# Patient Record
Sex: FEMALE | Race: WHITE | NOT HISPANIC OR LATINO | Employment: PART TIME | URBAN - METROPOLITAN AREA
[De-identification: names, ages, dates, MRNs, and addresses within clinical notes are randomized per-mention and may not be internally consistent; named-entity substitution may affect disease eponyms.]

---

## 2017-09-25 ENCOUNTER — PATIENT MESSAGE (OUTPATIENT)
Dept: MEDICAL GROUP | Facility: MEDICAL CENTER | Age: 47
End: 2017-09-25

## 2017-09-25 NOTE — TELEPHONE ENCOUNTER
From: Marcella Barajas  To: Sujit Ortega M.D.  Sent: 9/25/2017 9:57 AM PDT  Subject: Non-Urgent Medical Question    Good morning. I have had slightly swollen, and tender glands in my armpits for a couple months now (right worse than left). I am wondering if I should schedule an appointment? Maybe you would want me to get bloodwork done first, then come in for an appointment?    Thank you,  Marcella Barajas

## 2017-10-02 ENCOUNTER — OFFICE VISIT (OUTPATIENT)
Dept: MEDICAL GROUP | Facility: MEDICAL CENTER | Age: 47
End: 2017-10-02
Payer: COMMERCIAL

## 2017-10-02 VITALS
RESPIRATION RATE: 16 BRPM | DIASTOLIC BLOOD PRESSURE: 78 MMHG | TEMPERATURE: 98 F | OXYGEN SATURATION: 100 % | HEIGHT: 67 IN | BODY MASS INDEX: 25.08 KG/M2 | SYSTOLIC BLOOD PRESSURE: 122 MMHG | WEIGHT: 159.8 LBS | HEART RATE: 90 BPM

## 2017-10-02 DIAGNOSIS — Z12.31 ENCOUNTER FOR SCREENING MAMMOGRAM FOR BREAST CANCER: ICD-10-CM

## 2017-10-02 DIAGNOSIS — R92.30 DENSE BREAST TISSUE ON MAMMOGRAM: ICD-10-CM

## 2017-10-02 DIAGNOSIS — N64.4 BREAST PAIN IN FEMALE: ICD-10-CM

## 2017-10-02 PROCEDURE — 99213 OFFICE O/P EST LOW 20 MIN: CPT | Performed by: FAMILY MEDICINE

## 2017-10-02 ASSESSMENT — PATIENT HEALTH QUESTIONNAIRE - PHQ9: CLINICAL INTERPRETATION OF PHQ2 SCORE: 0

## 2017-10-02 NOTE — PROGRESS NOTES
"Subjective:   Marcella Barajas is a 47 y.o. female here today for Breast pain    Breast pain in female  Since July patient has had increased breast pain. She states the pain is 7 out of 10. Pain is most probably in right axilla. She has not noticed any changes on self breast exam. Last mammogram was 1.5 years ago and it was normal, although she has had heterogeneous breast tissue on mammogram.  Patient had a hysterectomy but ovaries were left in place. She denies any hot flashes.         Current medicines (including changes today)  Current Outpatient Prescriptions   Medication Sig Dispense Refill   • Multiple Vitamin (MULTI-VITAMIN PO) Take  by mouth.     • VITAMIN D PO Take  by mouth.     • Docusate Sodium (COLACE PO) Take  by mouth.       No current facility-administered medications for this visit.      She  has a past medical history of Anesthesia.    ROS   No breast discharge       Objective:     Blood pressure 122/78, pulse 90, temperature 36.7 °C (98 °F), resp. rate 16, height 1.702 m (5' 7.01\"), weight 72.5 kg (159 lb 12.8 oz), SpO2 100 %. Body mass index is 25.02 kg/m².   Physical Exam:  Constitutional: Alert, no distress.  Skin: Warm, dry, good turgor, no rashes in visible areas.  Eye: Equal, round and reactive, conjunctiva clear, lids normal.  Psych: Alert and oriented x3, normal affect and mood.  Breast: Symmetrical bilaterally, no scarring, no nipple retraction, no dimpling, no erythema, no nipple discharge, no breast masses, tenderness palpation of right axilla, no palpable lymph nodes, no drainage, no erythema      Assessment and Plan:   The following treatment plan was discussed    1. Breast pain in female  Discussed healthy diet and exercise. Check mammogram and screening ultrasound. If no improvement consider referral to GYN.    2. Dense breast tissue on mammogram  - US-SCREENING BREAST (SONOCINE); Future    3. Encounter for screening mammogram for breast cancer  - MA-SCREEN MAMMO " W/CAD-BILAT; Future      Followup: Return if symptoms worsen or fail to improve.

## 2017-10-02 NOTE — ASSESSMENT & PLAN NOTE
Since July patient has had increased breast pain. She states the pain is 7 out of 10. Pain is most probably in right axilla. She has not noticed any changes on self breast exam. Last mammogram was 1.5 years ago and it was normal, although she has had heterogeneous breast tissue on mammogram.  Patient had a hysterectomy but ovaries were left in place. She denies any hot flashes.

## 2017-10-03 ENCOUNTER — PATIENT MESSAGE (OUTPATIENT)
Dept: MEDICAL GROUP | Facility: MEDICAL CENTER | Age: 47
End: 2017-10-03

## 2017-11-28 ENCOUNTER — TELEPHONE (OUTPATIENT)
Dept: RADIOLOGY | Facility: MEDICAL CENTER | Age: 47
End: 2017-11-28

## 2017-11-28 NOTE — TELEPHONE ENCOUNTER
LM to conf apt @ [facility] on 11/29 @ 8:00 check in @ 7:45, reviewed prep and location

## 2017-11-29 ENCOUNTER — HOSPITAL ENCOUNTER (OUTPATIENT)
Dept: RADIOLOGY | Facility: MEDICAL CENTER | Age: 47
End: 2017-11-29
Attending: FAMILY MEDICINE
Payer: COMMERCIAL

## 2017-11-29 DIAGNOSIS — Z12.31 ENCOUNTER FOR SCREENING MAMMOGRAM FOR BREAST CANCER: ICD-10-CM

## 2017-11-29 DIAGNOSIS — R92.30 DENSE BREAST TISSUE ON MAMMOGRAM: ICD-10-CM

## 2017-11-29 PROCEDURE — G0202 SCR MAMMO BI INCL CAD: HCPCS

## 2017-11-29 PROCEDURE — 76641 ULTRASOUND BREAST COMPLETE: CPT

## 2018-06-19 ENCOUNTER — PATIENT MESSAGE (OUTPATIENT)
Dept: MEDICAL GROUP | Facility: MEDICAL CENTER | Age: 48
End: 2018-06-19

## 2018-06-19 DIAGNOSIS — Z12.83 SKIN CANCER SCREENING: ICD-10-CM

## 2018-06-19 NOTE — TELEPHONE ENCOUNTER
From: Marcella Barajas  To: Sujit Ortega M.D.  Sent: 6/19/2018 8:48 AM PDT  Subject: Non-Urgent Medical Question    Good morning. I called the dermatologist that two of my sons go to, asking for an appointment for myself. They said their office procedure requires me to get a referral from my primary care physician.     May I please get a referral for me to go see Christine Mariee R.N. at [facility].     [address] [place] [address], [phone], [phone] (fax)     Please let me know if you need anything else from me to process this request.    Thanks you,   Marcella Barajas

## 2018-11-23 ENCOUNTER — APPOINTMENT (OUTPATIENT)
Dept: RADIOLOGY | Facility: IMAGING CENTER | Age: 48
End: 2018-11-23
Attending: FAMILY MEDICINE
Payer: COMMERCIAL

## 2018-11-23 ENCOUNTER — OFFICE VISIT (OUTPATIENT)
Dept: URGENT CARE | Facility: CLINIC | Age: 48
End: 2018-11-23
Payer: COMMERCIAL

## 2018-11-23 VITALS
DIASTOLIC BLOOD PRESSURE: 66 MMHG | WEIGHT: 159.2 LBS | BODY MASS INDEX: 24.93 KG/M2 | OXYGEN SATURATION: 100 % | SYSTOLIC BLOOD PRESSURE: 128 MMHG | HEART RATE: 89 BPM | TEMPERATURE: 98.2 F

## 2018-11-23 DIAGNOSIS — S62.653A CLOSED NONDISPLACED FRACTURE OF MIDDLE PHALANX OF LEFT MIDDLE FINGER, INITIAL ENCOUNTER: ICD-10-CM

## 2018-11-23 DIAGNOSIS — M79.646 PAIN OF MIDDLE FINGER: ICD-10-CM

## 2018-11-23 PROCEDURE — 73140 X-RAY EXAM OF FINGER(S): CPT | Mod: 26,LT | Performed by: FAMILY MEDICINE

## 2018-11-23 PROCEDURE — 99214 OFFICE O/P EST MOD 30 MIN: CPT | Performed by: FAMILY MEDICINE

## 2018-11-23 RX ORDER — HYDROCODONE BITARTRATE AND ACETAMINOPHEN 5; 325 MG/1; MG/1
1 TABLET ORAL EVERY 4 HOURS PRN
Qty: 10 TAB | Refills: 0 | Status: SHIPPED | OUTPATIENT
Start: 2018-11-23 | End: 2018-11-26

## 2018-11-23 ASSESSMENT — ENCOUNTER SYMPTOMS
SHORTNESS OF BREATH: 0
FOCAL WEAKNESS: 0
SENSORY CHANGE: 0
SORE THROAT: 0
FEVER: 0

## 2018-11-24 NOTE — PROGRESS NOTES
Subjective:     Marcella Barajas is a 48 y.o. female who presents for Finger Injury (x 2 hrs, L hand middle finger )    HPI  Pt presents for evaluation of a new problem  Pt playing flag football and caught finger in pocket of another person   Fell a pulling and twisting injury of the left third digit and had immediate pain and swelling  Pain is greatest along the PIP and also having pain at the DIP joint  Pain is constant, moderate, sharp, and worse with movement  Pain does not radiate into the hand and did not injure any other fingers  Finger feels swollen and bruised  Slight fingertip numbness present  Unable to move her finger due to great pain  Has not broken this finger before    Review of Systems   Constitutional: Negative for fever.   HENT: Negative for sore throat.    Respiratory: Negative for shortness of breath.    Cardiovascular: Negative for chest pain.   Skin: Negative for rash.   Neurological: Negative for sensory change and focal weakness.     PMH:  has a past medical history of Anesthesia.  MEDS:   Current Outpatient Prescriptions:   •  Multiple Vitamin (MULTI-VITAMIN PO), Take  by mouth., Disp: , Rfl:   •  VITAMIN D PO, Take  by mouth., Disp: , Rfl:   •  Docusate Sodium (COLACE PO), Take  by mouth., Disp: , Rfl:   ALLERGIES: No Known Allergies  SURGHX:   Past Surgical History:   Procedure Laterality Date   • VAGINAL HYSTERECTOMY SCOPE TOTAL  1/26/2010    Performed by STEVEN LIAO at SURGERY SAME DAY [facility]S   • CYSTOSCOPY  1/26/2010    Performed by STEVEN LIAO at SURGERY SAME DAY [facility]   • LAPAROSCOPY     • OTHER      lasik   • TONSILLECTOMY       SOCHX:  reports that she has never smoked. She has never used smokeless tobacco. She reports that she drinks about 7.0 oz of alcohol per week . She reports that she does not use drugs.  FH: Family history was reviewed, not contributing to acute injury      Objective:   /66 (BP Location: Right arm, Patient Position:  Sitting)   Pulse 89   Temp 36.8 °C (98.2 °F)   Wt 72.2 kg (159 lb 3.2 oz)   SpO2 100%   BMI 24.93 kg/m²     Physical Exam   Constitutional: She is oriented to person, place, and time. She appears well-developed and well-nourished. No distress.   HENT:   Head: Normocephalic and atraumatic.   Musculoskeletal:   Left wrist/hand  General: Mild amount of swelling present throughout 3rd digit   Palpation: TTP along 3rd digit PIP and DIP  ROM: Limited by pain   Strength: 5/5 flexion, 5/5 extension, 5/5   Special testing: -Tinel’s, -Phalen's, -Finkelstein’s  Neuro: median, radial, ulnar nerves intact on testing  Vascular: radial, ulnar pulses 2+ and symmetric, cap refill <2 sec   Neurological: She is alert and oriented to person, place, and time. No sensory deficit.   Skin: Skin is warm and dry. She is not diaphoretic. No erythema.   Psychiatric: She has a normal mood and affect. Her behavior is normal. Judgment and thought content normal.     Left hand x-rays -personally reviewed images with patient in office today.  Mild radial subluxation of third distal phalanx with nondisplaced fracture of middle phalanx.  Also has a tiny lucency in the PIP joint which could represent fracture from the distal aspect of proximal phalanx.    Assessment/Plan:   Assessment    1. Closed nondisplaced fracture of middle phalanx of left middle finger, initial encounter  Patient is a 48-year-old female with acute injury a few hours ago which resulted in fracture of middle phalanx of left third digit.  Able to reduce patient's subluxation which is immediately pulled back into subluxed position.  Suspect she has an injury to her collateral ligament.  Patient was splinted in the straight position with finger splint and has good alignment on discharge.  She is being referred to orthopedic surgery for further management.  Given 3 days of Norco for pain control and advised to use very sparingly and rely mostly on ibuprofen and Tylenol for  pain control.  Patient will follow up with orthopedic surgery.  - DX-FINGER(S) 2+ LEFT; Future  - HYDROcodone-acetaminophen (NORCO) 5-325 MG Tab per tablet; Take 1 Tab by mouth every four hours as needed for up to 3 days.  Dispense: 10 Tab; Refill: 0  - Consent for Opiate Prescription  - REFERRAL TO ORTHOPEDICS    F/U PRN

## 2019-04-19 ENCOUNTER — OFFICE VISIT (OUTPATIENT)
Dept: MEDICAL GROUP | Facility: MEDICAL CENTER | Age: 49
End: 2019-04-19
Payer: COMMERCIAL

## 2019-04-19 VITALS
HEIGHT: 67 IN | DIASTOLIC BLOOD PRESSURE: 68 MMHG | OXYGEN SATURATION: 99 % | TEMPERATURE: 97.9 F | HEART RATE: 68 BPM | BODY MASS INDEX: 24.64 KG/M2 | SYSTOLIC BLOOD PRESSURE: 106 MMHG | WEIGHT: 157 LBS

## 2019-04-19 DIAGNOSIS — E55.9 VITAMIN D INSUFFICIENCY: ICD-10-CM

## 2019-04-19 DIAGNOSIS — Z12.31 ENCOUNTER FOR SCREENING MAMMOGRAM FOR BREAST CANCER: ICD-10-CM

## 2019-04-19 DIAGNOSIS — N64.4 BREAST PAIN IN FEMALE: ICD-10-CM

## 2019-04-19 DIAGNOSIS — Z90.710 H/O HYSTERECTOMY FOR BENIGN DISEASE: ICD-10-CM

## 2019-04-19 DIAGNOSIS — D68.51 FACTOR 5 LEIDEN MUTATION, HETEROZYGOUS (HCC): ICD-10-CM

## 2019-04-19 DIAGNOSIS — K59.09 CHRONIC CONSTIPATION: ICD-10-CM

## 2019-04-19 DIAGNOSIS — Z00.00 ANNUAL PHYSICAL EXAM: ICD-10-CM

## 2019-04-19 PROCEDURE — 99396 PREV VISIT EST AGE 40-64: CPT | Performed by: FAMILY MEDICINE

## 2019-04-19 ASSESSMENT — PATIENT HEALTH QUESTIONNAIRE - PHQ9: CLINICAL INTERPRETATION OF PHQ2 SCORE: 0

## 2019-04-19 NOTE — PROGRESS NOTES
"Subjective:   Marcella Barajas is a 49 y.o. female here today for annual  Patient is  will be retiring in the coming months and they will be losing her insurance.    She exercises a few times a week with adult league volleyball, personal training and generally being active.    Breast pain in female  Occurs only in the summer. No associated rash.    Chronic constipation  Patient states that her symptoms are controlled with Colace.    Vitamin D insufficiency  Patient is tolerating vitamin D supplementation.    Factor 5 Leiden mutation, heterozygous  No history of DVTs.  She stands up to walk when on a flight every hour.    H/O hysterectomy for benign disease  No concerns.         Current medicines (including changes today)  Current Outpatient Prescriptions   Medication Sig Dispense Refill   • Multiple Vitamin (MULTI-VITAMIN PO) Take  by mouth.     • VITAMIN D PO Take  by mouth.     • Docusate Sodium (COLACE PO) Take  by mouth.       No current facility-administered medications for this visit.      She  has a past medical history of Anesthesia.    ROS   No chest pain, no shortness of breath, no abdominal pain       Objective:     /68 (BP Location: Right arm, Patient Position: Sitting)   Pulse 68   Temp 36.6 °C (97.9 °F)   Ht 1.702 m (5' 7\")   Wt 71.2 kg (157 lb)   SpO2 99%  Body mass index is 24.59 kg/m².   Physical Exam:  Constitutional: Alert, no distress.  Skin: Warm, dry, good turgor, no rashes in visible areas.  Eye: Equal, round and reactive, conjunctiva clear, lids normal.  ENMT: Lips without lesions, good dentition, oropharynx clear.  TMs pearly gray bilaterally.  Neck: Trachea midline, no masses, no thyromegaly. No cervical or supraclavicular lymphadenopathy  Respiratory: Unlabored respiratory effort, lungs clear to auscultation, no wheezes, no ronchi.  Cardiovascular: Normal S1, S2, no murmur, no edema.  Abdomen: Soft, non-tender, no masses, no hepatosplenomegaly.  Psych: Alert " and oriented x3, normal affect and mood.        Assessment and Plan:   The following treatment plan was discussed    1. Annual physical exam  Encourage patient continue healthy lifestyle.  Check labs and call with results.  - CBC WITH DIFFERENTIAL; Future  - Comp Metabolic Panel; Future  - TSH WITH REFLEX TO FT4; Future  - Lipid Profile; Future    2. Encounter for screening mammogram for breast cancer  - MA-SCREEN MAMMO W/CAD-BILAT; Future    3. Breast pain in female  Stable.  Continue to monitor.    4. H/O hysterectomy for benign disease  Asymptomatic.  Continue to monitor.    5. Chronic constipation  Controlled.  Continue Colace.    6. Factor 5 Leiden mutation, heterozygous (HCC)  No concerns.  Continue to monitor.    7. Vitamin D insufficiency  Continue vitamin D supplementation.      Followup: Return in about 1 year (around 4/19/2020) for Annual.

## 2019-04-26 ENCOUNTER — HOSPITAL ENCOUNTER (OUTPATIENT)
Dept: LAB | Facility: MEDICAL CENTER | Age: 49
End: 2019-04-26
Attending: FAMILY MEDICINE
Payer: COMMERCIAL

## 2019-04-26 ENCOUNTER — TELEPHONE (OUTPATIENT)
Dept: MEDICAL GROUP | Facility: MEDICAL CENTER | Age: 49
End: 2019-04-26

## 2019-04-26 DIAGNOSIS — Z00.00 ANNUAL PHYSICAL EXAM: ICD-10-CM

## 2019-04-26 LAB
ALBUMIN SERPL BCP-MCNC: 4 G/DL (ref 3.2–4.9)
ALBUMIN/GLOB SERPL: 1.5 G/DL
ALP SERPL-CCNC: 77 U/L (ref 30–99)
ALT SERPL-CCNC: 18 U/L (ref 2–50)
ANION GAP SERPL CALC-SCNC: 4 MMOL/L (ref 0–11.9)
AST SERPL-CCNC: 21 U/L (ref 12–45)
BASOPHILS # BLD AUTO: 0.7 % (ref 0–1.8)
BASOPHILS # BLD: 0.03 K/UL (ref 0–0.12)
BILIRUB SERPL-MCNC: 0.5 MG/DL (ref 0.1–1.5)
BUN SERPL-MCNC: 17 MG/DL (ref 8–22)
CALCIUM SERPL-MCNC: 10.3 MG/DL (ref 8.5–10.5)
CHLORIDE SERPL-SCNC: 108 MMOL/L (ref 96–112)
CHOLEST SERPL-MCNC: 169 MG/DL (ref 100–199)
CO2 SERPL-SCNC: 28 MMOL/L (ref 20–33)
CREAT SERPL-MCNC: 0.73 MG/DL (ref 0.5–1.4)
EOSINOPHIL # BLD AUTO: 0.06 K/UL (ref 0–0.51)
EOSINOPHIL NFR BLD: 1.5 % (ref 0–6.9)
ERYTHROCYTE [DISTWIDTH] IN BLOOD BY AUTOMATED COUNT: 45.9 FL (ref 35.9–50)
FASTING STATUS PATIENT QL REPORTED: NORMAL
GLOBULIN SER CALC-MCNC: 2.6 G/DL (ref 1.9–3.5)
GLUCOSE SERPL-MCNC: 96 MG/DL (ref 65–99)
HCT VFR BLD AUTO: 45.7 % (ref 37–47)
HDLC SERPL-MCNC: 72 MG/DL
HGB BLD-MCNC: 15 G/DL (ref 12–16)
IMM GRANULOCYTES # BLD AUTO: 0.01 K/UL (ref 0–0.11)
IMM GRANULOCYTES NFR BLD AUTO: 0.2 % (ref 0–0.9)
LDLC SERPL CALC-MCNC: 83 MG/DL
LYMPHOCYTES # BLD AUTO: 1.24 K/UL (ref 1–4.8)
LYMPHOCYTES NFR BLD: 30.2 % (ref 22–41)
MCH RBC QN AUTO: 32.2 PG (ref 27–33)
MCHC RBC AUTO-ENTMCNC: 32.8 G/DL (ref 33.6–35)
MCV RBC AUTO: 98.1 FL (ref 81.4–97.8)
MONOCYTES # BLD AUTO: 0.33 K/UL (ref 0–0.85)
MONOCYTES NFR BLD AUTO: 8 % (ref 0–13.4)
NEUTROPHILS # BLD AUTO: 2.43 K/UL (ref 2–7.15)
NEUTROPHILS NFR BLD: 59.4 % (ref 44–72)
NRBC # BLD AUTO: 0 K/UL
NRBC BLD-RTO: 0 /100 WBC
PLATELET # BLD AUTO: 146 K/UL (ref 164–446)
PMV BLD AUTO: 13 FL (ref 9–12.9)
POTASSIUM SERPL-SCNC: 4.5 MMOL/L (ref 3.6–5.5)
PROT SERPL-MCNC: 6.6 G/DL (ref 6–8.2)
RBC # BLD AUTO: 4.66 M/UL (ref 4.2–5.4)
SODIUM SERPL-SCNC: 140 MMOL/L (ref 135–145)
TRIGL SERPL-MCNC: 69 MG/DL (ref 0–149)
TSH SERPL DL<=0.005 MIU/L-ACNC: 0.92 UIU/ML (ref 0.38–5.33)
WBC # BLD AUTO: 4.1 K/UL (ref 4.8–10.8)

## 2019-04-26 PROCEDURE — 85025 COMPLETE CBC W/AUTO DIFF WBC: CPT

## 2019-04-26 PROCEDURE — 80061 LIPID PANEL: CPT

## 2019-04-26 PROCEDURE — 36415 COLL VENOUS BLD VENIPUNCTURE: CPT

## 2019-04-26 PROCEDURE — 80053 COMPREHEN METABOLIC PANEL: CPT

## 2019-04-26 PROCEDURE — 84443 ASSAY THYROID STIM HORMONE: CPT

## 2019-04-26 NOTE — TELEPHONE ENCOUNTER
----- Message from Sujit Ortega M.D. sent at 4/26/2019  1:36 PM PDT -----  Please notify patient that blood counts are stable.  Blood sugar, kidney function, liver function, cholesterol, thyroid function are all normal.  Sujit Ortega M.D.

## 2019-05-22 ENCOUNTER — APPOINTMENT (OUTPATIENT)
Dept: RADIOLOGY | Facility: MEDICAL CENTER | Age: 49
End: 2019-05-22
Attending: FAMILY MEDICINE
Payer: COMMERCIAL

## 2019-06-19 ENCOUNTER — HOSPITAL ENCOUNTER (OUTPATIENT)
Dept: RADIOLOGY | Facility: MEDICAL CENTER | Age: 49
End: 2019-06-19
Attending: FAMILY MEDICINE
Payer: COMMERCIAL

## 2019-06-19 DIAGNOSIS — Z12.31 ENCOUNTER FOR SCREENING MAMMOGRAM FOR BREAST CANCER: ICD-10-CM

## 2019-06-19 PROCEDURE — 77063 BREAST TOMOSYNTHESIS BI: CPT

## 2019-06-20 ENCOUNTER — TELEPHONE (OUTPATIENT)
Dept: MEDICAL GROUP | Facility: MEDICAL CENTER | Age: 49
End: 2019-06-20

## 2019-06-20 NOTE — TELEPHONE ENCOUNTER
----- Message from Sujit Ortega M.D. sent at 6/20/2019  7:28 AM PDT -----  Please notify the patient that her mammogram shows that she has high breast density. This can lower the accuracy of mammograms. There is a screening ultrasound that can help to detect breast cancers in women with high breast density.  The screening ultrasound is not covered with insurance and cost around $100.  If she would like for me to order the screening ultrasound, please let me know.  Sujit Ortega M.D.

## 2019-08-13 ENCOUNTER — APPOINTMENT (RX ONLY)
Dept: URBAN - METROPOLITAN AREA CLINIC 35 | Facility: CLINIC | Age: 49
Setting detail: DERMATOLOGY
End: 2019-08-13

## 2019-08-13 DIAGNOSIS — L81.4 OTHER MELANIN HYPERPIGMENTATION: ICD-10-CM

## 2019-08-13 DIAGNOSIS — Z71.89 OTHER SPECIFIED COUNSELING: ICD-10-CM

## 2019-08-13 DIAGNOSIS — D22 MELANOCYTIC NEVI: ICD-10-CM

## 2019-08-13 PROBLEM — D22.4 MELANOCYTIC NEVI OF SCALP AND NECK: Status: ACTIVE | Noted: 2019-08-13

## 2019-08-13 PROBLEM — D22.62 MELANOCYTIC NEVI OF LEFT UPPER LIMB, INCLUDING SHOULDER: Status: ACTIVE | Noted: 2019-08-13

## 2019-08-13 PROBLEM — D22.5 MELANOCYTIC NEVI OF TRUNK: Status: ACTIVE | Noted: 2019-08-13

## 2019-08-13 PROBLEM — D23.72 OTHER BENIGN NEOPLASM OF SKIN OF LEFT LOWER LIMB, INCLUDING HIP: Status: ACTIVE | Noted: 2019-08-13

## 2019-08-13 PROBLEM — D22.71 MELANOCYTIC NEVI OF RIGHT LOWER LIMB, INCLUDING HIP: Status: ACTIVE | Noted: 2019-08-13

## 2019-08-13 PROBLEM — D22.72 MELANOCYTIC NEVI OF LEFT LOWER LIMB, INCLUDING HIP: Status: ACTIVE | Noted: 2019-08-13

## 2019-08-13 PROBLEM — D22.61 MELANOCYTIC NEVI OF RIGHT UPPER LIMB, INCLUDING SHOULDER: Status: ACTIVE | Noted: 2019-08-13

## 2019-08-13 PROCEDURE — 99214 OFFICE O/P EST MOD 30 MIN: CPT

## 2019-08-13 PROCEDURE — ? OBSERVATION AND MEASURE

## 2019-08-13 PROCEDURE — ? COUNSELING

## 2019-08-13 ASSESSMENT — LOCATION ZONE DERM
LOCATION ZONE: ARM
LOCATION ZONE: LEG
LOCATION ZONE: TRUNK
LOCATION ZONE: NECK

## 2019-08-13 ASSESSMENT — LOCATION SIMPLE DESCRIPTION DERM
LOCATION SIMPLE: RIGHT SHOULDER
LOCATION SIMPLE: RIGHT POSTERIOR UPPER ARM
LOCATION SIMPLE: RIGHT PRETIBIAL REGION
LOCATION SIMPLE: CHEST
LOCATION SIMPLE: LEFT THIGH
LOCATION SIMPLE: NECK
LOCATION SIMPLE: LEFT POPLITEAL SKIN
LOCATION SIMPLE: LEFT POSTERIOR UPPER ARM
LOCATION SIMPLE: RIGHT CALF
LOCATION SIMPLE: ABDOMEN
LOCATION SIMPLE: UPPER BACK

## 2019-08-13 ASSESSMENT — LOCATION DETAILED DESCRIPTION DERM
LOCATION DETAILED: RIGHT CENTRAL LATERAL NECK
LOCATION DETAILED: LEFT ANTERIOR DISTAL THIGH
LOCATION DETAILED: LEFT POPLITEAL SKIN
LOCATION DETAILED: RIGHT ANTERIOR SHOULDER
LOCATION DETAILED: LEFT PROXIMAL POSTERIOR UPPER ARM
LOCATION DETAILED: SUPERIOR THORACIC SPINE
LOCATION DETAILED: MIDDLE STERNUM
LOCATION DETAILED: RIGHT PROXIMAL CALF
LOCATION DETAILED: RIGHT CENTRAL ANTERIOR NECK
LOCATION DETAILED: RIGHT LATERAL ABDOMEN
LOCATION DETAILED: RIGHT PROXIMAL PRETIBIAL REGION
LOCATION DETAILED: RIGHT PROXIMAL POSTERIOR UPPER ARM

## 2019-11-26 ENCOUNTER — HOSPITAL ENCOUNTER (INPATIENT)
Facility: MEDICAL CENTER | Age: 49
LOS: 1 days | DRG: 300 | End: 2019-11-27
Attending: EMERGENCY MEDICINE | Admitting: INTERNAL MEDICINE
Payer: COMMERCIAL

## 2019-11-26 ENCOUNTER — APPOINTMENT (OUTPATIENT)
Dept: CARDIOLOGY | Facility: MEDICAL CENTER | Age: 49
DRG: 300 | End: 2019-11-26
Attending: INTERNAL MEDICINE
Payer: COMMERCIAL

## 2019-11-26 ENCOUNTER — APPOINTMENT (OUTPATIENT)
Dept: RADIOLOGY | Facility: MEDICAL CENTER | Age: 49
DRG: 300 | End: 2019-11-26
Attending: EMERGENCY MEDICINE
Payer: COMMERCIAL

## 2019-11-26 DIAGNOSIS — I26.99 ACUTE PULMONARY EMBOLISM WITHOUT ACUTE COR PULMONALE, UNSPECIFIED PULMONARY EMBOLISM TYPE (HCC): ICD-10-CM

## 2019-11-26 PROBLEM — Z98.890 S/P ABDOMINOPLASTY: Status: ACTIVE | Noted: 2019-11-26

## 2019-11-26 LAB
ALBUMIN SERPL BCP-MCNC: 4.1 G/DL (ref 3.2–4.9)
ALBUMIN/GLOB SERPL: 1.3 G/DL
ALP SERPL-CCNC: 87 U/L (ref 30–99)
ALT SERPL-CCNC: 16 U/L (ref 2–50)
ANION GAP SERPL CALC-SCNC: 15 MMOL/L (ref 0–11.9)
APTT PPP: 27 SEC (ref 24.7–36)
AST SERPL-CCNC: 20 U/L (ref 12–45)
BASOPHILS # BLD AUTO: 0.5 % (ref 0–1.8)
BASOPHILS # BLD: 0.04 K/UL (ref 0–0.12)
BILIRUB SERPL-MCNC: 0.4 MG/DL (ref 0.1–1.5)
BUN SERPL-MCNC: 11 MG/DL (ref 8–22)
CALCIUM SERPL-MCNC: 10.7 MG/DL (ref 8.4–10.2)
CHLORIDE SERPL-SCNC: 101 MMOL/L (ref 96–112)
CO2 SERPL-SCNC: 23 MMOL/L (ref 20–33)
CREAT SERPL-MCNC: 0.66 MG/DL (ref 0.5–1.4)
EKG IMPRESSION: NORMAL
EOSINOPHIL # BLD AUTO: 0.25 K/UL (ref 0–0.51)
EOSINOPHIL NFR BLD: 2.9 % (ref 0–6.9)
ERYTHROCYTE [DISTWIDTH] IN BLOOD BY AUTOMATED COUNT: 39.5 FL (ref 35.9–50)
GLOBULIN SER CALC-MCNC: 3.2 G/DL (ref 1.9–3.5)
GLUCOSE SERPL-MCNC: 107 MG/DL (ref 65–99)
HCT VFR BLD AUTO: 41 % (ref 37–47)
HGB BLD-MCNC: 14.1 G/DL (ref 12–16)
IMM GRANULOCYTES # BLD AUTO: 0.02 K/UL (ref 0–0.11)
IMM GRANULOCYTES NFR BLD AUTO: 0.2 % (ref 0–0.9)
INR PPP: 0.96 (ref 0.87–1.13)
LV EJECT FRACT  99904: 70
LV EJECT FRACT MOD 2C 99903: 71.03
LV EJECT FRACT MOD 4C 99902: 72.04
LV EJECT FRACT MOD BP 99901: 71.53
LYMPHOCYTES # BLD AUTO: 1.22 K/UL (ref 1–4.8)
LYMPHOCYTES NFR BLD: 14.3 % (ref 22–41)
MCH RBC QN AUTO: 31.5 PG (ref 27–33)
MCHC RBC AUTO-ENTMCNC: 34.4 G/DL (ref 33.6–35)
MCV RBC AUTO: 91.5 FL (ref 81.4–97.8)
MONOCYTES # BLD AUTO: 0.79 K/UL (ref 0–0.85)
MONOCYTES NFR BLD AUTO: 9.2 % (ref 0–13.4)
NEUTROPHILS # BLD AUTO: 6.24 K/UL (ref 2–7.15)
NEUTROPHILS NFR BLD: 72.9 % (ref 44–72)
NRBC # BLD AUTO: 0 K/UL
NRBC BLD-RTO: 0 /100 WBC
PLATELET # BLD AUTO: 141 K/UL (ref 164–446)
PMV BLD AUTO: 11.2 FL (ref 9–12.9)
POTASSIUM SERPL-SCNC: 3.8 MMOL/L (ref 3.6–5.5)
PROT SERPL-MCNC: 7.3 G/DL (ref 6–8.2)
PROTHROMBIN TIME: 12.9 SEC (ref 12–14.6)
RBC # BLD AUTO: 4.48 M/UL (ref 4.2–5.4)
SODIUM SERPL-SCNC: 139 MMOL/L (ref 135–145)
TROPONIN T SERPL-MCNC: <6 NG/L (ref 6–19)
WBC # BLD AUTO: 8.6 K/UL (ref 4.8–10.8)

## 2019-11-26 PROCEDURE — 700105 HCHG RX REV CODE 258: Performed by: EMERGENCY MEDICINE

## 2019-11-26 PROCEDURE — 770020 HCHG ROOM/CARE - TELE (206)

## 2019-11-26 PROCEDURE — 85730 THROMBOPLASTIN TIME PARTIAL: CPT

## 2019-11-26 PROCEDURE — 85610 PROTHROMBIN TIME: CPT

## 2019-11-26 PROCEDURE — 36415 COLL VENOUS BLD VENIPUNCTURE: CPT

## 2019-11-26 PROCEDURE — 700102 HCHG RX REV CODE 250 W/ 637 OVERRIDE(OP): Performed by: INTERNAL MEDICINE

## 2019-11-26 PROCEDURE — 71275 CT ANGIOGRAPHY CHEST: CPT

## 2019-11-26 PROCEDURE — 700117 HCHG RX CONTRAST REV CODE 255: Performed by: EMERGENCY MEDICINE

## 2019-11-26 PROCEDURE — 93306 TTE W/DOPPLER COMPLETE: CPT | Mod: 26 | Performed by: INTERNAL MEDICINE

## 2019-11-26 PROCEDURE — 94760 N-INVAS EAR/PLS OXIMETRY 1: CPT

## 2019-11-26 PROCEDURE — A9270 NON-COVERED ITEM OR SERVICE: HCPCS | Performed by: INTERNAL MEDICINE

## 2019-11-26 PROCEDURE — 93005 ELECTROCARDIOGRAM TRACING: CPT | Performed by: EMERGENCY MEDICINE

## 2019-11-26 PROCEDURE — 99223 1ST HOSP IP/OBS HIGH 75: CPT | Performed by: INTERNAL MEDICINE

## 2019-11-26 PROCEDURE — 80053 COMPREHEN METABOLIC PANEL: CPT

## 2019-11-26 PROCEDURE — 85025 COMPLETE CBC W/AUTO DIFF WBC: CPT

## 2019-11-26 PROCEDURE — 93306 TTE W/DOPPLER COMPLETE: CPT

## 2019-11-26 PROCEDURE — 99285 EMERGENCY DEPT VISIT HI MDM: CPT

## 2019-11-26 PROCEDURE — 84484 ASSAY OF TROPONIN QUANT: CPT

## 2019-11-26 RX ORDER — ONDANSETRON 4 MG/1
4 TABLET, ORALLY DISINTEGRATING ORAL EVERY 6 HOURS PRN
COMMUNITY
End: 2019-12-03

## 2019-11-26 RX ORDER — GABAPENTIN 300 MG/1
300 CAPSULE ORAL PRN
COMMUNITY
End: 2019-12-03

## 2019-11-26 RX ORDER — ONDANSETRON 2 MG/ML
4 INJECTION INTRAMUSCULAR; INTRAVENOUS EVERY 4 HOURS PRN
Status: DISCONTINUED | OUTPATIENT
Start: 2019-11-26 | End: 2019-11-27 | Stop reason: HOSPADM

## 2019-11-26 RX ORDER — PROMETHAZINE HYDROCHLORIDE 25 MG/1
12.5-25 TABLET ORAL EVERY 4 HOURS PRN
Status: DISCONTINUED | OUTPATIENT
Start: 2019-11-26 | End: 2019-11-27 | Stop reason: HOSPADM

## 2019-11-26 RX ORDER — PROCHLORPERAZINE EDISYLATE 5 MG/ML
5-10 INJECTION INTRAMUSCULAR; INTRAVENOUS EVERY 4 HOURS PRN
Status: DISCONTINUED | OUTPATIENT
Start: 2019-11-26 | End: 2019-11-27 | Stop reason: HOSPADM

## 2019-11-26 RX ORDER — POLYETHYLENE GLYCOL 3350 17 G/17G
17 POWDER, FOR SOLUTION ORAL PRN
COMMUNITY
End: 2019-12-03

## 2019-11-26 RX ORDER — PROMETHAZINE HYDROCHLORIDE 25 MG/1
12.5-25 SUPPOSITORY RECTAL EVERY 4 HOURS PRN
Status: DISCONTINUED | OUTPATIENT
Start: 2019-11-26 | End: 2019-11-27 | Stop reason: HOSPADM

## 2019-11-26 RX ORDER — ONDANSETRON 4 MG/1
4 TABLET, ORALLY DISINTEGRATING ORAL EVERY 4 HOURS PRN
Status: DISCONTINUED | OUTPATIENT
Start: 2019-11-26 | End: 2019-11-27 | Stop reason: HOSPADM

## 2019-11-26 RX ORDER — CYCLOBENZAPRINE HCL 10 MG
10 TABLET ORAL 3 TIMES DAILY PRN
Status: DISCONTINUED | OUTPATIENT
Start: 2019-11-26 | End: 2019-11-27 | Stop reason: HOSPADM

## 2019-11-26 RX ORDER — POLYETHYLENE GLYCOL 3350 17 G/17G
1 POWDER, FOR SOLUTION ORAL
Status: DISCONTINUED | OUTPATIENT
Start: 2019-11-26 | End: 2019-11-27 | Stop reason: HOSPADM

## 2019-11-26 RX ORDER — SODIUM CHLORIDE, SODIUM LACTATE, POTASSIUM CHLORIDE, CALCIUM CHLORIDE 600; 310; 30; 20 MG/100ML; MG/100ML; MG/100ML; MG/100ML
1000 INJECTION, SOLUTION INTRAVENOUS ONCE
Status: COMPLETED | OUTPATIENT
Start: 2019-11-26 | End: 2019-11-26

## 2019-11-26 RX ORDER — BISACODYL 10 MG
10 SUPPOSITORY, RECTAL RECTAL
Status: DISCONTINUED | OUTPATIENT
Start: 2019-11-26 | End: 2019-11-27 | Stop reason: HOSPADM

## 2019-11-26 RX ORDER — OXYCODONE HYDROCHLORIDE AND ACETAMINOPHEN 5; 325 MG/1; MG/1
1-2 TABLET ORAL EVERY 6 HOURS PRN
COMMUNITY
End: 2019-12-03

## 2019-11-26 RX ORDER — CYCLOBENZAPRINE HCL 10 MG
10 TABLET ORAL 3 TIMES DAILY PRN
COMMUNITY
End: 2019-12-03

## 2019-11-26 RX ORDER — CEPHALEXIN 500 MG/1
500 CAPSULE ORAL 4 TIMES DAILY
COMMUNITY
Start: 2019-11-21 | End: 2019-12-03

## 2019-11-26 RX ORDER — OXYCODONE HYDROCHLORIDE AND ACETAMINOPHEN 5; 325 MG/1; MG/1
1-2 TABLET ORAL EVERY 6 HOURS PRN
Status: DISCONTINUED | OUTPATIENT
Start: 2019-11-26 | End: 2019-11-27 | Stop reason: HOSPADM

## 2019-11-26 RX ORDER — AMOXICILLIN 250 MG
2 CAPSULE ORAL 2 TIMES DAILY
Status: DISCONTINUED | OUTPATIENT
Start: 2019-11-26 | End: 2019-11-27 | Stop reason: HOSPADM

## 2019-11-26 RX ORDER — GABAPENTIN 300 MG/1
300 CAPSULE ORAL 3 TIMES DAILY PRN
Status: DISCONTINUED | OUTPATIENT
Start: 2019-11-26 | End: 2019-11-27 | Stop reason: HOSPADM

## 2019-11-26 RX ADMIN — IOHEXOL 75 ML: 350 INJECTION, SOLUTION INTRAVENOUS at 12:47

## 2019-11-26 RX ADMIN — SENNOSIDES AND DOCUSATE SODIUM 2 TABLET: 8.6; 5 TABLET ORAL at 17:06

## 2019-11-26 RX ADMIN — SODIUM CHLORIDE, POTASSIUM CHLORIDE, SODIUM LACTATE AND CALCIUM CHLORIDE 1000 ML: 600; 310; 30; 20 INJECTION, SOLUTION INTRAVENOUS at 12:15

## 2019-11-26 RX ADMIN — RIVAROXABAN 15 MG: 15 TABLET, FILM COATED ORAL at 14:21

## 2019-11-26 ASSESSMENT — COGNITIVE AND FUNCTIONAL STATUS - GENERAL
DAILY ACTIVITIY SCORE: 24
MOBILITY SCORE: 24
SUGGESTED CMS G CODE MODIFIER DAILY ACTIVITY: CH
SUGGESTED CMS G CODE MODIFIER MOBILITY: CH

## 2019-11-26 ASSESSMENT — ENCOUNTER SYMPTOMS
FEVER: 0
CONSTIPATION: 1
DIARRHEA: 0
DIZZINESS: 0
SHORTNESS OF BREATH: 1
TREMORS: 0
CHILLS: 0
ABDOMINAL PAIN: 0
ORTHOPNEA: 0
MYALGIAS: 0
STRIDOR: 0
NAUSEA: 0
SPUTUM PRODUCTION: 0
WHEEZING: 0
VOMITING: 0
HEADACHES: 0
SORE THROAT: 0
BACK PAIN: 0

## 2019-11-26 ASSESSMENT — COPD QUESTIONNAIRES
IN THE PAST 12 MONTHS DO YOU DO LESS THAN YOU USED TO BECAUSE OF YOUR BREATHING PROBLEMS: DISAGREE/UNSURE
DURING THE PAST 4 WEEKS HOW MUCH DID YOU FEEL SHORT OF BREATH: NONE/LITTLE OF THE TIME
DO YOU EVER COUGH UP ANY MUCUS OR PHLEGM?: NO/ONLY WITH OCCASIONAL COLDS OR INFECTIONS
COPD SCREENING SCORE: 0
HAVE YOU SMOKED AT LEAST 100 CIGARETTES IN YOUR ENTIRE LIFE: NO/DON'T KNOW

## 2019-11-26 ASSESSMENT — LIFESTYLE VARIABLES
EVER_SMOKED: NEVER
HAVE YOU EVER FELT YOU SHOULD CUT DOWN ON YOUR DRINKING: NO
HAVE PEOPLE ANNOYED YOU BY CRITICIZING YOUR DRINKING: NO
DOES PATIENT WANT TO STOP DRINKING: NO
EVER FELT BAD OR GUILTY ABOUT YOUR DRINKING: NO
EVER HAD A DRINK FIRST THING IN THE MORNING TO STEADY YOUR NERVES TO GET RID OF A HANGOVER: NO
TOTAL SCORE: 0
ON A TYPICAL DAY WHEN YOU DRINK ALCOHOL HOW MANY DRINKS DO YOU HAVE: 2
HOW MANY TIMES IN THE PAST YEAR HAVE YOU HAD 5 OR MORE DRINKS IN A DAY: 0
AVERAGE NUMBER OF DAYS PER WEEK YOU HAVE A DRINK CONTAINING ALCOHOL: 3
CONSUMPTION TOTAL: NEGATIVE
ALCOHOL_USE: YES
TOTAL SCORE: 0
EVER_SMOKED: NEVER
TOTAL SCORE: 0

## 2019-11-26 ASSESSMENT — PATIENT HEALTH QUESTIONNAIRE - PHQ9
1. LITTLE INTEREST OR PLEASURE IN DOING THINGS: NOT AT ALL
SUM OF ALL RESPONSES TO PHQ9 QUESTIONS 1 AND 2: 0
2. FEELING DOWN, DEPRESSED, IRRITABLE, OR HOPELESS: NOT AT ALL

## 2019-11-26 NOTE — ASSESSMENT & PLAN NOTE
And patient is heterozygous for factor V leiden mutation causing her to be hypercoagulable  Encourage follow up with plastic surgery after discharge as scheduled

## 2019-11-26 NOTE — ED PROVIDER NOTES
"[facility] Provider Note    CHIEF COMPLAINT  Chief Complaint   Patient presents with   • Post-Op Complications     pt reports difficulty taking a full breath, had tummy tuck 1 week ago   • Sent by MD QUINONEZ  Marcella Barajas is a 49 y.o. female who presents to the emergency department after being referred by her plastic surgeon.  Patient had abdominal plasty 1 week ago.  Had follow-up checkup and told them about the chest pain that she developed this morning.  It first started over the left lateral chest and has since migrated left parasternal region.  Sharp character.  Only present if she takes a deep breath.  Constant since this morning.  She has not had cough or hemoptysis.  No leg swelling or leg pain.  No fever.  No new abdominal pain.  Her wounds were checked this morning by her plastic surgeon the nerve looked well.      REVIEW OF SYSTEMS  As per HPI, otherwise a 10 point review of systems is negative    PAST MEDICAL HISTORY  Past Medical History:   Diagnosis Date   • Anesthesia     nausea   • Factor 5 Leiden mutation, heterozygous (HCC)        SOCIAL HISTORY  Social History     Tobacco Use   • Smoking status: Never Smoker   • Smokeless tobacco: Never Used   Substance Use Topics   • Alcohol use: Yes     Alcohol/week: 7.0 oz     Types: 14 Glasses of wine per week   • Drug use: No       SURGICAL HISTORY  Past Surgical History:   Procedure Laterality Date   • VAGINAL HYSTERECTOMY SCOPE TOTAL  1/26/2010    Performed by STEVEN LIAO at SURGERY SAME DAY [facility]   • CYSTOSCOPY  1/26/2010    Performed by STEVEN LIAO at SURGERY SAME DAY [facility]   • LAPAROSCOPY     • OTHER      lasik   • TONSILLECTOMY         CURRENT MEDICATIONS  Home Medications    **Home medications have not yet been reviewed for this encounter**         ALLERGIES  No Known Allergies    PHYSICAL EXAM  VITAL SIGNS: /86   Pulse (!) 117   Temp 37.7 °C (99.8 °F) (Temporal)   Resp 20   Ht 1.727 m (5' 8\")  "  Wt 72.5 kg (159 lb 13.3 oz)   SpO2 95%   BMI 24.30 kg/m²    Constitutional: Awake and alert.  Anxious  HENT:  Atraumatic, Normocephalic.Oropharynx dry mucus membranes, Nose normal inspection.   Eyes: Normal inspection  Neck: Supple  Cardiovascular: Tachycardic heart rate, Normal rhythm.  Symmetric peripheral pulses.   Thorax & Lungs: Splinting respiration, No wheezing, No rales, No rhonchi, No chest tenderness.   Abdomen: Mild tenderness.  Skin: Warm, Dry, No rash.   Back: No tenderness, No CVA tenderness.   Extremities: No clubbing, cyanosis, edema, no Homans or cords   Neurologic: Grossly normal   Psychiatric: Anxious appearing    RADIOLOGY/PROCEDURES  CT-CTA CHEST PULMONARY ARTERY W/ RECONS   Final Result      1.  Bilateral pulmonary emboli with findings on CT suggestive of right ventricular strain      2.  Groundglass opacification in the lingula is likely related to pulmonary infarct. Additional linear opacities in the lower lobes are likely related to atelectasis.      3.  Small left pleural effusion         Comment: Results discussed with Dr. Carr at approximately 1:03 PM           Imaging is interpreted by radiologist    Labs:  Results for orders placed or performed during the hospital encounter of 11/26/19   CBC w/ Differential   Result Value Ref Range    WBC 8.6 4.8 - 10.8 K/uL    RBC 4.48 4.20 - 5.40 M/uL    Hemoglobin 14.1 12.0 - 16.0 g/dL    Hematocrit 41.0 37.0 - 47.0 %    MCV 91.5 81.4 - 97.8 fL    MCH 31.5 27.0 - 33.0 pg    MCHC 34.4 33.6 - 35.0 g/dL    RDW 39.5 35.9 - 50.0 fL    Platelet Count 141 (L) 164 - 446 K/uL    MPV 11.2 9.0 - 12.9 fL    Neutrophils-Polys 72.90 (H) 44.00 - 72.00 %    Lymphocytes 14.30 (L) 22.00 - 41.00 %    Monocytes 9.20 0.00 - 13.40 %    Eosinophils 2.90 0.00 - 6.90 %    Basophils 0.50 0.00 - 1.80 %    Immature Granulocytes 0.20 0.00 - 0.90 %    Nucleated RBC 0.00 /100 WBC    Neutrophils (Absolute) 6.24 2.00 - 7.15 K/uL    Lymphs (Absolute) 1.22 1.00 - 4.80 K/uL     Monos (Absolute) 0.79 0.00 - 0.85 K/uL    Eos (Absolute) 0.25 0.00 - 0.51 K/uL    Baso (Absolute) 0.04 0.00 - 0.12 K/uL    Immature Granulocytes (abs) 0.02 0.00 - 0.11 K/uL    NRBC (Absolute) 0.00 K/uL   Complete Metabolic Panel (CMP)   Result Value Ref Range    Sodium 139 135 - 145 mmol/L    Potassium 3.8 3.6 - 5.5 mmol/L    Chloride 101 96 - 112 mmol/L    Co2 23 20 - 33 mmol/L    Anion Gap 15.0 (H) 0.0 - 11.9    Glucose 107 (H) 65 - 99 mg/dL    Bun 11 8 - 22 mg/dL    Creatinine 0.66 0.50 - 1.40 mg/dL    Calcium 10.7 (H) 8.4 - 10.2 mg/dL    AST(SGOT) 20 12 - 45 U/L    ALT(SGPT) 16 2 - 50 U/L    Alkaline Phosphatase 87 30 - 99 U/L    Total Bilirubin 0.4 0.1 - 1.5 mg/dL    Albumin 4.1 3.2 - 4.9 g/dL    Total Protein 7.3 6.0 - 8.2 g/dL    Globulin 3.2 1.9 - 3.5 g/dL    A-G Ratio 1.3 g/dL   Troponin STAT   Result Value Ref Range    Troponin T <6 6 - 19 ng/L   APTT   Result Value Ref Range    APTT 27.0 24.7 - 36.0 sec   PT/INR   Result Value Ref Range    PT 12.9 12.0 - 14.6 sec    INR 0.96 0.87 - 1.13   ESTIMATED GFR   Result Value Ref Range    GFR If African American >60 >60 mL/min/1.73 m 2    GFR If Non African American >60 >60 mL/min/1.73 m 2   EKG   Result Value Ref Range    Report       [facility]    Test Date:  2019  Pt Name:    ROBIN MCGREGOR           Department: [facility]  MRN:        [id number]                      Room:       -ROOM 5  Gender:     Female                       Technician: NICKOLAS  :        1970                   Requested By:SHIRA RAMIREZ  Order #:    [id number]                    Reading MD: SHIRA RAMIREZ MD    Measurements  Intervals                                Axis  Rate:       108                          P:          72  FL:         156                          QRS:        70  QRSD:       87                           T:          32  QT:         316  QTc:        424    Interpretive Statements  Sinus tachycardia  No previous ECG  available for comparison  Electronically Signed On 11- 12:17:01 PST by SHIRA RAMIREZ MD         Medications   rivaroxaban (XARELTO) tablet 15 mg (has no administration in time range)     Followed by   rivaroxaban (XARELTO) tablet 20 mg (has no administration in time range)   LR infusion (bolus) (1,000 mL Intravenous New Bag 11/26/19 1215)   iohexol (OMNIPAQUE) 350 mg/mL (75 mL Intravenous Given 11/26/19 1247)       HYDRATION: Based on the patient's presentation of Tachycardia the patient was given IV fluids. IV Hydration was used because oral hydration was not as rapid as required. Upon recheck following hydration, the patient was Stable.    COURSE & MEDICAL DECISION MAKING  Patient presents with pleuritic chest pain 1 week after surgery.  Work-up is initiated.  Tachycardic and given IV fluids.  EKG does not show ischemia.  CT scan demonstrates bilateral pulmonary emboli.  Discussed with Dr. Coe.  I have ordered Xarelto.  Patient will be admitted to the hospital.  Condition guarded      FINAL IMPRESSION  1.  Bilateral pulmonary emboli    CRITICAL CARE TIME 30 minutes  There was a very real possibility of deterioration of the patient's condition.  This patient required the highest level of care.  I provided critical care services which included: review of the medical record, treatment orders, ordering and reviewing test results, frequent reevaluation of the patient's condition and response to treatment, as well as discussing the case with appropriate personnel and various consultants. The critical care time associated with the care of this patient is exclusive of any procedures or specific interventions.      This dictation was created using voice recognition software. The accuracy of the dictation is limited to the abilities of the software.  The nursing notes were reviewed and certain aspects of this information were incorporated into this note.      Electronically signed by: Shira Ramirez, 11/26/2019  12:15 PM

## 2019-11-26 NOTE — ED TRIAGE NOTES
"Chief Complaint   Patient presents with   • Post-Op Complications     pt reports difficulty taking a full breath, had tummy tuck 1 week ago   • Sent by MD     Pt reports that she is experiencing CP, started below left breast and is now substernal.  Pt is tachycardic in triage. Pt has factor 5 Leiden but is not anticoagulated.   /86   Pulse (!) 117   Temp 37.7 °C (99.8 °F) (Temporal)   Resp 20   Ht 1.727 m (5' 8\")   Wt 72.5 kg (159 lb 13.3 oz)   SpO2 95%   Pt informed of wait times. Educated on triage process.  Asked to return to triage RN for any new or worsening of symptoms. Thanked for patience.         "

## 2019-11-26 NOTE — ASSESSMENT & PLAN NOTE
With evidence of right heart strain on CT scan, will check an echocardiogram to evaluate farther  Will give one dose weight based lovenox now and start xarelto for anticoagulation  The patient has no tachycardia or hypoxia, will monitor closely on telemetry

## 2019-11-26 NOTE — ED NOTES
ERP at bedside. Pt agrees with plan of care discussed by ERP. AIDET acknowledged with patient. IV established. Blood sent to lab.Niyah in low position, side rail up for pt safety. Call light within reach. Will continue to monitor.

## 2019-11-26 NOTE — H&P
Hospital Medicine History & Physical Note    Date of Service  11/26/2019    Primary Care Physician  Sujit Ortega M.D.    Consultants  none    Code Status  full    Chief Complaint  Chest pain    History of Presenting Illness  49 y.o. female who presented 11/26/2019 with chest pain worse with taking a deep breath since 4 am yesterday. She had abdominoplasty one week ago and has been up getting to and from the bathroom but has not been doing as much as normal due to pain. She is heterozygous for factor V leiden mutation but has had a hysterectomy and other surgeries in the past with  No clots. Her brother had a blood clot in the past and this is what triggered her genetic testing for factor V mutation. She denies any cough, fever, chills, leg swelling or headache.    Review of Systems  Review of Systems   Constitutional: Negative for chills, fever and malaise/fatigue.   HENT: Negative for congestion and sore throat.    Respiratory: Positive for shortness of breath. Negative for sputum production, wheezing and stridor.    Cardiovascular: Positive for chest pain. Negative for orthopnea and leg swelling.   Gastrointestinal: Positive for constipation. Negative for abdominal pain, diarrhea, nausea and vomiting.   Genitourinary: Negative for dysuria, frequency and urgency.   Musculoskeletal: Negative for back pain and myalgias.   Skin: Negative for itching and rash.   Neurological: Negative for dizziness, tremors and headaches.   All other systems reviewed and are negative.      Past Medical History   has a past medical history of Anesthesia and Factor 5 Leiden mutation, heterozygous (HCC).    Surgical History   has a past surgical history that includes tonsillectomy; laparoscopy; other; vaginal hysterectomy scope total (1/26/2010); and cystoscopy (1/26/2010). abdominoplasty    Family History  family history includes Hypertension in her mother.     Social History   reports that she has never smoked. She has never used  smokeless tobacco. She reports current alcohol use of about 7.0 oz of alcohol per week. She reports that she does not use drugs.    Allergies  No Known Allergies    Medications  Prior to Admission Medications   Prescriptions Last Dose Informant Patient Reported? Taking?   Docusate Sodium (COLACE PO) 11/25/2019 at 2100 Patient Yes No   Sig: Take 2 Caps by mouth every evening.   Multiple Vitamin (MULTI-VITAMIN PO) 11/25/2019 at 2100 Patient Yes No   Sig: Take 1 Tab by mouth every evening.   Sennosides (EX-LAX PO) 11/24/2019 at AM Patient Yes Yes   Sig: Take 1 Tab by mouth as needed (For constipation).   VITAMIN D PO > 1 week at PM Patient Yes No   Sig: Take 1 Cap by mouth every evening.   cephALEXin (KEFLEX) 500 MG Cap 11/25/2019 at Finished Patient's Home Pharmacy Yes Yes   Sig: Take 500 mg by mouth 4 times a day. Pt started on 11/21/2019 for 5 day course.   cyclobenzaprine (FLEXERIL) 10 MG Tab 11/25/2019 at 0700 Patient's Home Pharmacy Yes Yes   Sig: Take 10 mg by mouth 3 times a day as needed for Muscle Spasms.   gabapentin (NEURONTIN) 300 MG Cap > 3 days at Unknown Patient's Home Pharmacy Yes Yes   Sig: Take 300 mg by mouth as needed (For pain).   ondansetron (ZOFRAN ODT) 4 MG TABLET DISPERSIBLE 11/19/2019 at PM Patient's Home Pharmacy Yes Yes   Sig: Take 4 mg by mouth every 6 hours as needed for Nausea.   oxyCODONE-acetaminophen (PERCOCET) 5-325 MG Tab 11/25/2019 at 0900 Patient's Home Pharmacy Yes Yes   Sig: Take 1-2 Tabs by mouth every 6 hours as needed for Severe Pain.   polyethylene glycol/lytes (MIRALAX) Pack 11/21/2019 at Unknown Patient Yes Yes   Sig: Take 17 g by mouth as needed. Indications: Constipation      Facility-Administered Medications: None       Physical Exam  Temp:  [37.7 °C (99.8 °F)] 37.7 °C (99.8 °F)  Pulse:  [101-117] 102  Resp:  [20-34] 21  BP: (129-134)/(70-86) 132/70  SpO2:  [95 %-98 %] 96 %    Physical Exam  Vitals signs and nursing note reviewed.   Constitutional:       General: She is  not in acute distress.  HENT:      Head: Normocephalic and atraumatic.      Nose: No congestion or rhinorrhea.      Mouth/Throat:      Mouth: Mucous membranes are moist.      Pharynx: No oropharyngeal exudate or posterior oropharyngeal erythema.   Eyes:      Conjunctiva/sclera: Conjunctivae normal.      Pupils: Pupils are equal, round, and reactive to light.   Neck:      Musculoskeletal: Neck supple. No muscular tenderness.   Cardiovascular:      Rate and Rhythm: Normal rate and regular rhythm.      Pulses: Normal pulses.      Heart sounds: Normal heart sounds.   Pulmonary:      Effort: Pulmonary effort is normal. No respiratory distress.      Breath sounds: No stridor. No wheezing.   Abdominal:      General: Bowel sounds are normal.      Palpations: Abdomen is soft.   Musculoskeletal:         General: No swelling or tenderness.   Skin:     Coloration: Skin is not jaundiced or pale.      Findings: No bruising.   Neurological:      Mental Status: She is alert and oriented to person, place, and time.      Coordination: Coordination normal.   Psychiatric:         Mood and Affect: Mood normal.         Behavior: Behavior normal.         Laboratory:  Recent Labs     11/26/19  1206   WBC 8.6   RBC 4.48   HEMOGLOBIN 14.1   HEMATOCRIT 41.0   MCV 91.5   MCH 31.5   MCHC 34.4   RDW 39.5   PLATELETCT 141*   MPV 11.2     Recent Labs     11/26/19  1206   SODIUM 139   POTASSIUM 3.8   CHLORIDE 101   CO2 23   GLUCOSE 107*   BUN 11   CREATININE 0.66   CALCIUM 10.7*     Recent Labs     11/26/19  1206   ALTSGPT 16   ASTSGOT 20   ALKPHOSPHAT 87   TBILIRUBIN 0.4   GLUCOSE 107*     Recent Labs     11/26/19  1206   APTT 27.0   INR 0.96     No results for input(s): NTPROBNP in the last 72 hours.      Recent Labs     11/26/19  1206   TROPONINT <6       Urinalysis:    No results found     Imaging:  CT-CTA CHEST PULMONARY ARTERY W/ RECONS   Final Result      1.  Bilateral pulmonary emboli with findings on CT suggestive of right ventricular  strain      2.  Groundglass opacification in the lingula is likely related to pulmonary infarct. Additional linear opacities in the lower lobes are likely related to atelectasis.      3.  Small left pleural effusion         Comment: Results discussed with Dr. Carr at approximately 1:03 PM      EC-ECHOCARDIOGRAM COMPLETE W/O CONT    (Results Pending)         Assessment/Plan:  I anticipate this patient will require at least two midnights for appropriate medical management, necessitating inpatient admission.    S/P abdominoplasty- (present on admission)  Assessment & Plan  And patient is heterozygous for factor V leiden mutation causing her to be hypercoagulable  Encourage follow up with plastic surgery after discharge as scheduled    Acute pulmonary embolism (HCC)- (present on admission)  Assessment & Plan  With evidence of right heart strain on CT scan, will check an echocardiogram to evaluate farther  Will give one dose weight based lovenox now and start xarelto for anticoagulation  The patient has no tachycardia or hypoxia, will monitor closely on telemetry    Chronic constipation- (present on admission)  Assessment & Plan  Will order stool softeners  As  needed        VTE prophylaxis: lovenox/xarelto

## 2019-11-26 NOTE — ED NOTES
Med rec updated and complete  Allergies reviewed  Interviewed pt with mother at bedside with permission from pt.  Pt is not sure what medications she was taking.  Called Onofre Chuy @ [phone] to verify all prescription medications, went back asked pt last time she took her medications.

## 2019-11-27 ENCOUNTER — PATIENT OUTREACH (OUTPATIENT)
Dept: HEALTH INFORMATION MANAGEMENT | Facility: OTHER | Age: 49
End: 2019-11-27

## 2019-11-27 VITALS
BODY MASS INDEX: 24.73 KG/M2 | OXYGEN SATURATION: 98 % | HEIGHT: 68 IN | TEMPERATURE: 98.1 F | RESPIRATION RATE: 18 BRPM | DIASTOLIC BLOOD PRESSURE: 69 MMHG | HEART RATE: 94 BPM | SYSTOLIC BLOOD PRESSURE: 118 MMHG | WEIGHT: 163.14 LBS

## 2019-11-27 LAB
ANION GAP SERPL CALC-SCNC: 15 MMOL/L (ref 0–11.9)
BASOPHILS # BLD AUTO: 0.5 % (ref 0–1.8)
BASOPHILS # BLD: 0.04 K/UL (ref 0–0.12)
BUN SERPL-MCNC: 8 MG/DL (ref 8–22)
CALCIUM SERPL-MCNC: 10.6 MG/DL (ref 8.4–10.2)
CHLORIDE SERPL-SCNC: 100 MMOL/L (ref 96–112)
CO2 SERPL-SCNC: 20 MMOL/L (ref 20–33)
CREAT SERPL-MCNC: 0.54 MG/DL (ref 0.5–1.4)
EOSINOPHIL # BLD AUTO: 0.32 K/UL (ref 0–0.51)
EOSINOPHIL NFR BLD: 4.3 % (ref 0–6.9)
ERYTHROCYTE [DISTWIDTH] IN BLOOD BY AUTOMATED COUNT: 40.3 FL (ref 35.9–50)
GLUCOSE SERPL-MCNC: 96 MG/DL (ref 65–99)
HCT VFR BLD AUTO: 38.7 % (ref 37–47)
HGB BLD-MCNC: 13.1 G/DL (ref 12–16)
IMM GRANULOCYTES # BLD AUTO: 0.01 K/UL (ref 0–0.11)
IMM GRANULOCYTES NFR BLD AUTO: 0.1 % (ref 0–0.9)
LYMPHOCYTES # BLD AUTO: 1.33 K/UL (ref 1–4.8)
LYMPHOCYTES NFR BLD: 17.9 % (ref 22–41)
MCH RBC QN AUTO: 31.5 PG (ref 27–33)
MCHC RBC AUTO-ENTMCNC: 33.9 G/DL (ref 33.6–35)
MCV RBC AUTO: 93 FL (ref 81.4–97.8)
MONOCYTES # BLD AUTO: 0.67 K/UL (ref 0–0.85)
MONOCYTES NFR BLD AUTO: 9 % (ref 0–13.4)
NEUTROPHILS # BLD AUTO: 5.05 K/UL (ref 2–7.15)
NEUTROPHILS NFR BLD: 68.2 % (ref 44–72)
NRBC # BLD AUTO: 0 K/UL
NRBC BLD-RTO: 0 /100 WBC
PLATELET # BLD AUTO: 153 K/UL (ref 164–446)
PMV BLD AUTO: 11.7 FL (ref 9–12.9)
POTASSIUM SERPL-SCNC: 4.4 MMOL/L (ref 3.6–5.5)
RBC # BLD AUTO: 4.16 M/UL (ref 4.2–5.4)
SODIUM SERPL-SCNC: 135 MMOL/L (ref 135–145)
WBC # BLD AUTO: 7.4 K/UL (ref 4.8–10.8)

## 2019-11-27 PROCEDURE — 99239 HOSP IP/OBS DSCHRG MGMT >30: CPT | Performed by: HOSPITALIST

## 2019-11-27 PROCEDURE — A9270 NON-COVERED ITEM OR SERVICE: HCPCS | Performed by: HOSPITALIST

## 2019-11-27 PROCEDURE — 700102 HCHG RX REV CODE 250 W/ 637 OVERRIDE(OP): Performed by: INTERNAL MEDICINE

## 2019-11-27 PROCEDURE — 80048 BASIC METABOLIC PNL TOTAL CA: CPT

## 2019-11-27 PROCEDURE — 85025 COMPLETE CBC W/AUTO DIFF WBC: CPT

## 2019-11-27 PROCEDURE — 700102 HCHG RX REV CODE 250 W/ 637 OVERRIDE(OP): Performed by: HOSPITALIST

## 2019-11-27 PROCEDURE — A9270 NON-COVERED ITEM OR SERVICE: HCPCS | Performed by: INTERNAL MEDICINE

## 2019-11-27 RX ORDER — ACETAMINOPHEN 325 MG/1
650 TABLET ORAL EVERY 6 HOURS PRN
Status: DISCONTINUED | OUTPATIENT
Start: 2019-11-27 | End: 2019-11-27 | Stop reason: HOSPADM

## 2019-11-27 RX ADMIN — ACETAMINOPHEN 650 MG: 325 TABLET, FILM COATED ORAL at 05:17

## 2019-11-27 RX ADMIN — RIVAROXABAN 15 MG: 15 TABLET, FILM COATED ORAL at 04:20

## 2019-11-27 NOTE — PROGRESS NOTES
Telemetry Shift Summary    Rhythm ST  HR Range 101-103  Ectopy none  Measurements 0.16/0.08/0.34        Normal Values  Rhythm SR  HR Range    Measurements 0.12-0.20 / 0.06-0.10  / 0.30-0.52

## 2019-11-27 NOTE — FLOWSHEET NOTE
11/26/19 1629   Events/Summary/Plan   Events/Summary/Plan RCP completed. IS given   Interdisciplinary Plan of Care-Goals (Indications)   Hyperinflation Protocol Indications Atelectasis Documented by Chest X-Ray   Education   Education Yes - Pt. / Family has been Instructed in use of Respiratory Equipment   Incentive Spirometry Group   Incentive Spirometry Instruction Yes   Breathing Exercises Yes   Incentive Spirometer Volume 1000 mL   Incentive Spirometer Date Last Changed 11/26/19   Incentive Spirometer Next Change Date (Q 30 Days) 12/26/19   Chest Exam   Respiration 18   Pulse (!) 105   Breath Sounds   RUL Breath Sounds Clear   RML Breath Sounds Clear   RLL Breath Sounds Diminished   AARON Breath Sounds Clear   LLL Breath Sounds Diminished   Oximetry   #Pulse Oximetry (Single Determination) Yes   Oxygen   Home O2 Use Prior To Admission? No   Pulse Oximetry 93 %   O2 Daily Delivery Respiratory  Room Air with O2 Available

## 2019-11-27 NOTE — PROGRESS NOTES
Telemetry Shift Summary    Rhythm ST  HR Range 100s  Ectopy n/a  Measurements 0.16/0.08/0.32        Normal Values  Rhythm SR  HR Range    Measurements 0.12-0.20 / 0.06-0.10  / 0.30-0.52

## 2019-11-27 NOTE — PROGRESS NOTES
Discharge instructions given, all belongings with patient, escorted down to ER entrance, going home with family

## 2019-11-27 NOTE — DISCHARGE INSTRUCTIONS
Discharge Instructions per Terri Tavares M.D.    Follow up with Dr. Ortega to get prescription for 20mg Xarelto after finishing the two week course of loading dose. Consider having him refer you to a hematologist.    DIET: regular.    ACTIVITY: as tolerated    DIAGNOSIS: bilateral pulmonary emboli, factor V Leiden heterozygous.    Return to ER if you have chest pain worsening, shortness of breath worse, coughing up blood.  Discharge Instructions    Discharged to home by car with relative. Discharged via wheelchair, hospital escort: Yes.  Special equipment needed: Not Applicable    Be sure to schedule a follow-up appointment with your primary care doctor or any specialists as instructed.     Discharge Plan:   Diet Plan: Discussed  Activity Level: Discussed  Confirmed Follow up Appointment: Appointment Scheduled  Confirmed Symptoms Management: Discussed  Medication Reconciliation Updated: Yes  Influenza Vaccine Indication: Not indicated: Previously immunized this influenza season and > 8 years of age    I understand that a diet low in cholesterol, fat, and sodium is recommended for good health. Unless I have been given specific instructions below for another diet, I accept this instruction as my diet prescription.   Other diet: Regular    Special Instructions: None    · Is patient discharged on Warfarin / Coumadin?   No     Depression / Suicide Risk    As you are discharged from this [facility] facility, it is important to learn how to keep safe from harming yourself.    Recognize the warning signs:  · Abrupt changes in personality, positive or negative- including increase in energy   · Giving away possessions  · Change in eating patterns- significant weight changes-  positive or negative  · Change in sleeping patterns- unable to sleep or sleeping all the time   · Unwillingness or inability to communicate  · Depression  · Unusual sadness, discouragement and loneliness  · Talk of wanting to die  · Neglect of  personal appearance   · Rebelliousness- reckless behavior  · Withdrawal from people/activities they love  · Confusion- inability to concentrate     If you or a loved one observes any of these behaviors or has concerns about self-harm, here's what you can do:  · Talk about it- your feelings and reasons for harming yourself  · Remove any means that you might use to hurt yourself (examples: pills, rope, extension cords, firearm)  · Get professional help from the community (Mental Health, Substance Abuse, psychological counseling)  · Do not be alone:Call your Safe Contact- someone whom you trust who will be there for you.  · Call your local CRISIS HOTLINE 059-6498 or 763-580-6480  · Call your local Children's Mobile Crisis Response Team [place] (631) 035-4178 or www.Legend3D  · Call the toll free National Suicide Prevention Hotlines   · National Suicide Prevention Lifeline 082-771-IDKJ (3628)  · [employer] Line Network 800-SUICIDE (430-4594)      Pulmonary Embolism  A pulmonary embolism (PE) is a sudden blockage or decrease of blood flow in one lung or both lungs. Most blockages come from a blood clot that travels from the legs or the pelvis to the lungs. PE is a dangerous and potentially life-threatening condition if it is not treated right away.  What are the causes?  A pulmonary embolism occurs most commonly when a blood clot travels from one of your veins to your lungs. Rarely, PE is caused by air, fat, amniotic fluid, or part of a tumor traveling through your veins to your lungs.  What increases the risk?  A PE is more likely to develop in:  · People who smoke.  · People who are older, especially over 60 years of age.  · People who are overweight (obese).  · People who sit or lie still for a long time, such as during long-distance travel (over 4 hours), bed rest, hospitalization, or during recovery from certain medical conditions like a stroke.  · People who do not engage in much physical activity  (sedentary lifestyle).  · People who have chronic breathing disorders.  · People who have a personal or family history of blood clots or blood clotting disease.  · People who have peripheral vascular disease (PVD), diabetes, or some types of cancer.  · People who have heart disease, especially if the person had a recent heart attack or has congestive heart failure.  · People who have neurological diseases that affect the legs (leg paresis).  · People who have had a traumatic injury, such as breaking a hip or leg.  · People who have recently had major or lengthy surgery, especially on the hip, knee, or abdomen.  · People who have had a central line placed inside a large vein.  · People who take medicines that contain the hormone estrogen. These include birth control pills and hormone replacement therapy.  · Pregnancy or during childbirth or the postpartum period.  What are the signs or symptoms?  The symptoms of a PE usually start suddenly and include:  · Shortness of breath while active or at rest.  · Coughing or coughing up blood or blood-tinged mucus.  · Chest pain that is often worse with deep breaths.  · Rapid or irregular heartbeat.  · Feeling light-headed or dizzy.  · Fainting.  · Feeling anxious.  · Sweating.  There may also be pain and swelling in a leg if that is where the blood clot started.  These symptoms may represent a serious problem that is an emergency. Do not wait to see if the symptoms will go away. Get medical help right away. Call your local emergency services (911 in the [place] Do not drive yourself to the hospital.   How is this diagnosed?  Your health care provider will take a medical history and perform a physical exam. You may also have other tests, including:  · Blood tests to assess the clotting properties of your blood, assess oxygen levels in your blood, and find blood clots.  · Imaging tests, such as CT, ultrasound, MRI, X-ray, and other tests to see if you have clots anywhere in your  body.  · An electrocardiogram (ECG) to look for heart strain from blood clots in the lungs.  How is this treated?  The main goals of PE treatment are:  · To stop a blood clot from growing larger.  · To stop new blood clots from forming.  The type of treatment that you receive depends on many factors, such as the cause of your PE, your risk for bleeding or developing more clots, and other medical conditions that you have. Sometimes, a combination of treatments is necessary.  This condition may be treated with:  · Medicines, including newer oral blood thinners (anticoagulants), warfarin, low molecular weight heparins, thrombolytics, or heparins.  · Wearing compression stockings or using different types of devices.  · Surgery (rare) to remove the blood clot or to place a filter in your abdomen to stop the blood clot from traveling to your lungs.  Treatments for a PE are often divided into immediate treatment, long-term treatment (up to 3 months after PE), and extended treatment (more than 3 months after PE). Your treatment may continue for several months. This is called maintenance therapy, and it is used to prevent the forming of new blood clots. You can work with your health care provider to choose the treatment program that is best for you.  What are anticoagulants?   Anticoagulants are medicines that treat PEs. They can stop current blood clots from growing and stop new clots from forming. They cannot dissolve existing clots. Your body dissolves clots by itself over time. Anticoagulants are given by mouth, by injection, or through an IV tube.  What are thrombolytics?   Thrombolytics are clot-dissolving medicines that are used to dissolve a PE. They carry a high risk of bleeding, so they tend to be used only in severe cases or if you have very low blood pressure.  Follow these instructions at home:  If you are taking a newer oral anticoagulant:  · Take the medicine every single day at the same time each  day.  · Understand what foods and drugs interact with this medicine.  · Understand that there are no regular blood tests required when using this medicine.  · Understand the side effects of this medicine, including excessive bruising or bleeding. Ask your health care provider or pharmacist about other possible side effects.  If you are taking warfarin:  · Understand how to take warfarin and know which foods can affect how warfarin works in your body.  · Understand that it is dangerous to take too much or too little warfarin. Too much warfarin increases the risk of bleeding. Too little warfarin continues to allow the risk for blood clots.  · Follow your PT and INR blood testing schedule. The PT and INR results allow your health care provider to adjust your dose of warfarin. It is very important that you have your PT and INR tested as often as told by your health care provider.  · Avoid major changes in your diet, or tell your health care provider before you change your diet. Arrange a visit with a registered dietitian to answer your questions. Many foods, especially foods that are high in vitamin K, can interfere with warfarin and affect the PT and INR results. Eat a consistent amount of foods that are high in vitamin K, such as:  ¨ Spinach, kale, broccoli, cabbage, mike greens, turnip greens, [place] sprouts, peas, cauliflower, seaweed, and parsley.  ¨ Beef liver and pork liver.  ¨ Green tea.  ¨ Soybean oil.  · Tell your health care provider about any and all medicines, vitamins, and supplements that you take, including aspirin and other over-the-counter anti-inflammatory medicines. Be especially cautious with aspirin and anti-inflammatory medicines. Do not take those before you ask your health care provider if it is safe to do so. This is important because many medicines can interfere with warfarin and affect the PT and INR results.  · Do not start or stop taking any over-the-counter or prescription medicine  unless your health care provider or pharmacist tells you to do so.  If you take warfarin, you will also need to do these things:  · Hold pressure over cuts for longer than usual.  · Tell your dentist and other health care providers that you are taking warfarin before you have any procedures in which bleeding may occur.  · Avoid alcohol or drink very small amounts. Tell your health care provider if you change your alcohol intake.  · Do not use tobacco products, including cigarettes, chewing tobacco, and e-cigarettes. If you need help quitting, ask your health care provider.  · Avoid contact sports.  General instructions  · Take over-the-counter and prescription medicines only as told by your health care provider. Anticoagulant medicines can have side effects, including easy bruising and difficulty stopping bleeding. If you are prescribed an anticoagulant, you will also need to do these things:  ¨ Hold pressure over cuts for longer than usual.  ¨ Tell your dentist and other health care providers that you are taking anticoagulants before you have any procedures in which bleeding may occur.  ¨ Avoid contact sports.  · Wear a medical alert bracelet or carry a medical alert card that says you have had a PE.  · Ask your health care provider how soon you can go back to your normal activities. Stay active to prevent new blood clots from forming.  · Make sure to exercise while traveling or when you have been sitting or standing for a long period of time. It is very important to exercise. Exercise your legs by walking or by tightening and relaxing your leg muscles often. Take frequent walks.  · Wear compression stockings as told by your health care provider to help prevent more blood clots from forming.  · Do not use tobacco products, including cigarettes, chewing tobacco, and e-cigarettes. If you need help quitting, ask your health care provider.  · Keep all follow-up appointments with your health care provider. This is  important.  How is this prevented?  Take these actions to decrease your risk of developing another PE:  · Exercise regularly. For at least 30 minutes every day, engage in:  ¨ Activity that involves moving your arms and legs.  ¨ Activity that encourages good blood flow through your body by increasing your heart rate.  · Exercise your arms and legs every hour during long-distance travel (over 4 hours). Drink plenty of water and avoid drinking alcohol while traveling.  · Avoid sitting or lying in bed for long periods of time without moving your legs.  · Maintain a weight that is appropriate for your height. Ask your health care provider what weight is healthy for you.  · If you are a woman who is over 35 years of age, avoid unnecessary use of medicines that contain estrogen. These include birth control pills.  · Do not smoke, especially if you take estrogen medicines. If you need help quitting, ask your health care provider.  · If you are at very high risk for PE, wear compression stockings.  · If you recently had a PE, have regularly scheduled ultrasound testing on your legs to check for new blood clots.  If you are hospitalized, prevention measures may include:  · Early walking after surgery, as soon as your health care provider says that it is safe.  · Receiving anticoagulants to prevent blood clots. If you cannot take anticoagulants, other options may be available, such as wearing compression stockings or using different types of devices.  Get help right away if:  · You have new or increased pain, swelling, or redness in an arm or leg.  · You have numbness or tingling in an arm or leg.  · You have shortness of breath while active or at rest.  · You have chest pain.  · You have a rapid or irregular heartbeat.  · You feel light-headed or dizzy.  · You cough up blood.  · You notice blood in your vomit, bowel movement, or urine.  · You have a fever.  These symptoms may represent a serious problem that is an emergency.  Do not wait to see if the symptoms will go away. Get medical help right away. Call your local emergency services (911 in the [place] Do not drive yourself to the hospital.   This information is not intended to replace advice given to you by your health care provider. Make sure you discuss any questions you have with your health care provider.  Document Released: 12/15/2001 Document Revised: 05/25/2017 Document Reviewed: 04/13/2016  [employer] Interactive Patient Education © 2017 [employer].  Rivaroxaban oral tablets  What is this medicine?  RIVAROXABAN (ri va DAV a ban) is an anticoagulant (blood thinner). It is used to treat blood clots in the lungs or in the veins. It is also used after knee or hip surgeries to prevent blood clots. It is also used to lower the chance of stroke in people with a medical condition called atrial fibrillation.  This medicine may be used for other purposes; ask your health care provider or pharmacist if you have questions.  COMMON BRAND NAME(S): Xarelto, Xarelto Starter Pack  What should I tell my health care provider before I take this medicine?  They need to know if you have any of these conditions:  -bleeding disorders  -bleeding in the brain  -blood in your stools (black or tarry stools) or if you have blood in your vomit  -history of stomach bleeding  -kidney disease  -liver disease  -low blood counts, like low white cell, platelet, or red cell counts  -recent or planned spinal or epidural procedure  -take medicines that treat or prevent blood clots  -an unusual or allergic reaction to rivaroxaban, other medicines, foods, dyes, or preservatives  -pregnant or trying to get pregnant  -breast-feeding  How should I use this medicine?  Take this medicine by mouth with a glass of water. Follow the directions on the prescription label. Take your medicine at regular intervals. Do not take it more often than directed. Do not stop taking except on your doctor's advice. Stopping this medicine  may increase your risk of a blood clot. Be sure to refill your prescription before you run out of medicine.  If you are taking this medicine after hip or knee replacement surgery, take it with or without food. If you are taking this medicine for atrial fibrillation, take it with your evening meal. If you are taking this medicine to treat blood clots, take it with food at the same time each day. If you are unable to swallow your tablet, you may crush the tablet and mix it in applesauce. Then, immediately eat the applesauce. You should eat more food right after you eat the applesauce containing the crushed tablet.  Talk to your pediatrician regarding the use of this medicine in children. Special care may be needed.  Overdosage: If you think you have taken too much of this medicine contact a poison control center or emergency room at once.  NOTE: This medicine is only for you. Do not share this medicine with others.  What if I miss a dose?  If you take your medicine once a day and miss a dose, take the missed dose as soon as you remember. If you take your medicine twice a day and miss a dose, take the missed dose immediately. In this instance, 2 tablets may be taken at the same time. The next day you should take 1 tablet twice a day as directed.  What may interact with this medicine?  Do not take this medicine with any of the following medications:  -defibrotide  This medicine may also interact with the following medications:  -aspirin and aspirin-like medicines  -certain antibiotics like erythromycin, azithromycin, and clarithromycin  -certain medicines for fungal infections like ketoconazole and itraconazole  -certain medicines for irregular heart beat like amiodarone, quinidine, dronedarone  -certain medicines for seizures like carbamazepine, phenytoin  -certain medicines that treat or prevent blood clots like warfarin, enoxaparin, and dalteparin  -conivaptan  -diltiazem  -felodipine  -indinavir  -lopinavir;  ritonavir  -NSAIDS, medicines for pain and inflammation, like ibuprofen or naproxen  -ranolazine  -rifampin  -ritonavir  -SNRIs, medicines for depression, like desvenlafaxine, duloxetine, levomilnacipran, venlafaxine  -SSRIs, medicines for depression, like citalopram, escitalopram, fluoxetine, fluvoxamine, paroxetine, sertraline  -Brenden's wort  -verapamil  This list may not describe all possible interactions. Give your health care provider a list of all the medicines, herbs, non-prescription drugs, or dietary supplements you use. Also tell them if you smoke, drink alcohol, or use illegal drugs. Some items may interact with your medicine.  What should I watch for while using this medicine?  Visit your doctor or health care professional for regular checks on your progress.  Notify your doctor or health care professional and seek emergency treatment if you develop breathing problems; changes in vision; chest pain; severe, sudden headache; pain, swelling, warmth in the leg; trouble speaking; sudden numbness or weakness of the face, arm or leg. These can be signs that your condition has gotten worse.  If you are going to have surgery or other procedure, tell your doctor that you are taking this medicine.  What side effects may I notice from receiving this medicine?  Side effects that you should report to your doctor or health care professional as soon as possible:  -allergic reactions like skin rash, itching or hives, swelling of the face, lips, or tongue  -back pain  -redness, blistering, peeling or loosening of the skin, including inside the mouth  -signs and symptoms of bleeding such as bloody or black, tarry stools; red or dark-brown urine; spitting up blood or brown material that looks like coffee grounds; red spots on the skin; unusual bruising or bleeding from the eye, gums, or nose  Side effects that usually do not require medical attention (report to your doctor or health care professional if they continue or  are bothersome):  -dizziness  -muscle pain  This list may not describe all possible side effects. Call your doctor for medical advice about side effects. You may report side effects to FDA at 6-281-GCT-9656.  Where should I keep my medicine?  Keep out of the reach of children.  Store at room temperature between 15 and 30 degrees C (59 and 86 degrees F). Throw away any unused medicine after the expiration date.  NOTE: This sheet is a summary. It may not cover all possible information. If you have questions about this medicine, talk to your doctor, pharmacist, or health care provider.  © 2018 Elsevier/Gold Standard (2017-09-06 16:29:33)

## 2019-11-27 NOTE — PROGRESS NOTES
Telemetry Shift Summary    Rhythm SR ST  HR Range 80-120s  Ectopy  No ecopt    Measurements for strip printed 11/26/2019 at 1836:        0.14/0.08/0.30        Normal Values  Rhythm SR  HR Range    Measurements 0.12-0.20 / 0.06-0.10  / 0.30-0.52

## 2019-11-27 NOTE — PROGRESS NOTES
Report received. Assumed care of pt. A/O x4. VSS. Responds appropriately.  Assessment completed.  Call light and belongings within reach. Bed in the lowest position. Treaded socks in place. Hourly rounding in progress. Safety precautions in place

## 2019-11-27 NOTE — CARE PLAN
Problem: Safety  Goal: Will remain free from injury  Outcome: PROGRESSING AS EXPECTED  No injury     Problem: Knowledge Deficit  Goal: Knowledge of disease process/condition, treatment plan, diagnostic tests, and medications will improve  Outcome: PROGRESSING AS EXPECTED  Understands POC     Problem: Discharge Barriers/Planning  Goal: Patient's continuum of care needs will be met  Outcome: PROGRESSING AS EXPECTED  Going home today, will need Xarelto, spoke to SW

## 2019-11-27 NOTE — CARE PLAN
Problem: Communication  Goal: The ability to communicate needs accurately and effectively will improve  Outcome: PROGRESSING AS EXPECTED  Note:   AO4 able to verbalize needs clearly. Demonstrates appropriate use of call light.        Problem: Safety  Goal: Will remain free from falls  Outcome: PROGRESSING AS EXPECTED  Intervention: Assess risk factors for falls  Flowsheets  Taken 11/26/2019 2000  Pt Calls for Assistance: Yes;No assistance required  Taken 11/27/2019 0043  Fall Risk: Risk to Fall -  0 - 1 point  History of fall: 0  Mobility Status Assessment: 0-Ambulates & Transfers Independently. No Assistance Required  Risk for Injury-Any positive answers results in the pt being at high risk for fall related injury: Coagulation:  Therapeutic anticoagulation use (NOT prophylaxis)  Intervention: Implement fall precautions  Flowsheets (Taken 11/26/2019 2000)  Environmental Precautions: Personal Belongings, Wastebasket, Call Bell etc. in Easy Reach;Treaded Slipper Socks on Patient;Report Given to Other Health Care Providers Regarding Fall Risk;Bed in Low Position;Communication Sign for Patients & Families;Mobility Assessed & Appropriate Sign Placed     Problem: Respiratory:  Goal: Respiratory status will improve  Note:   Pt encouraged and educated on use of IS 10x hour for waking hours. Difficulty with  and pain on deep inspiration from pain and tight abdominal binder. Lung sounds clear, diminished at bases, sats 90s on RA.

## 2019-11-27 NOTE — PROGRESS NOTES
Patient arrived to unit at approximately 1530, mother at bedside, ambulatory without assistance. Admit profile and assessment completed, discomfort upon deep inspiration. Patient aware of plan of care including echocardiogram and Xarelto. Telemetry applied, VSS, sinus tachycardia noted upon telemetry monitoring.

## 2019-11-27 NOTE — PROGRESS NOTES
"1900: Received report from lesia Barrett RN. Pt status and POC discussed. Pt is in bed with no signs of distress, calm with unlabored breathing. Denies any needs at this time. Fall precautions in place. Call light within reach.   2000: Encouraged on use of and educated on purpose for IS, 1250mL volume x3.  She has difficulty taking deep breaths d/t pain on inspiration and tight abdominal binder, denies any SOB. Ambulating around room without issues. Discussed POC and shift routine with patient. Voices no questions or concerns.   0000: Sleeping in bed, calm with unlabored breathing. Safety and fall precautions in place.   0400: due med given. Pt c/o 4/10 frontal headache from \"not sleeping well.\" requesting prn tylenol, None on MAR, Dr Borden notified new orders placed.   0700: Report given to TEE Ortiz.     "

## 2019-11-27 NOTE — DISCHARGE PLANNING
LSW provided pt with 30 day free trial card and information to log into [employer] website to get co-pay card. Pt has not met deductible per [facility]. Pt stated that after hospital stay she will probably have met deductible and can afford cost going forward. Pt also following with PCP in 2 weeks.     RADHAW updated RN

## 2019-11-27 NOTE — CARE PLAN
Problem: Communication  Goal: The ability to communicate needs accurately and effectively will improve  Outcome: PROGRESSING AS EXPECTED  Intervention: [place] patient and significant other/support system to call light to alert staff of needs  Note:   Patient oriented to call light system and all relevant hospital policies. Call light within reach and used appropriately when in need of assistance.       Problem: Infection  Goal: Will remain free from infection  Outcome: PROGRESSING AS EXPECTED  Intervention: Implement standard precautions and perform hand washing before and after patient contact  Note:   Standard precautions and hand hygiene practices implemented before and after patient room entry. Gloves worn during times of patient contact.

## 2019-11-27 NOTE — DISCHARGE SUMMARY
Discharge Summary    CHIEF COMPLAINT ON ADMISSION  Chief Complaint   Patient presents with   • Post-Op Complications     pt reports difficulty taking a full breath, had tummy tuck 1 week ago   • Sent by MD       Reason for Admission  Post Op Complication     Admission Date  11/26/2019    CODE STATUS  Full Code    HPI & HOSPITAL COURSE  History of Presenting Illness per Dr. Coe's H&P:  49 y.o. female who presented 11/26/2019 with chest pain worse with taking a deep breath since 4 am yesterday. She had abdominoplasty one week ago and has been up getting to and from the bathroom but has not been doing as much as normal due to pain. She is heterozygous for factor V leiden mutation but has had a hysterectomy and other surgeries in the past with  No clots. Her brother had a blood clot in the past and this is what triggered her genetic testing for factor V mutation. She denies any cough, fever, chills, leg swelling or headache.       HOSPITAL COURSE: She had a CT scan of the chest which showed bilateral pulmonary emboli.  There was concern strain seen on the CT scan.  Echocardiogram does show slightly elevated right ventricular pressure, only 24.  She is not short of breath and has not needed oxygen during her stay.  Patient was started on Xarelto at discharge was tolerating it.  She will follow up with her primary care provider to continue Xarelto after the initial loading dose.  She also is considering going to hematologist for further advice on anticoagulation in the future especially perioperative.    Therefore, she is discharged in good and stable condition to home with close outpatient follow-up.    The patient recovered much more quickly than anticipated on admission.    Discharge Date  11/27/19    FOLLOW UP ITEMS POST DISCHARGE  Primary care to get prescription for 20mg xarelto.    DISCHARGE DIAGNOSES  Active Problems:    Chronic constipation POA: Yes    Acute pulmonary embolism (HCC) POA: Yes    S/P  abdominoplasty POA: Yes  Resolved Problems:    * No resolved hospital problems. *      FOLLOW UP  No future appointments.  No follow-up provider specified.    MEDICATIONS ON DISCHARGE     Medication List      START taking these medications      Instructions   rivaroxaban 15 MG Tabs tablet  Commonly known as:  XARELTO   Take 1 Tab by mouth 2 times a day, with meals.  Dose:  15 mg        CONTINUE taking these medications      Instructions   cephALEXin 500 MG Caps  Commonly known as:  KEFLEX   Take 500 mg by mouth 4 times a day. Pt started on 11/21/2019 for 5 day course.  Dose:  500 mg     COLACE PO   Take 2 Caps by mouth every evening.  Dose:  2 Cap     cyclobenzaprine 10 MG Tabs  Commonly known as:  FLEXERIL   Take 10 mg by mouth 3 times a day as needed for Muscle Spasms.  Dose:  10 mg     EX-LAX PO   Take 1 Tab by mouth as needed (For constipation).  Dose:  1 Tab     gabapentin 300 MG Caps  Commonly known as:  NEURONTIN   Take 300 mg by mouth as needed (For pain).  Dose:  300 mg     MULTI-VITAMIN PO   Take 1 Tab by mouth every evening.  Dose:  1 Tab     ondansetron 4 MG Tbdp  Commonly known as:  ZOFRAN ODT   Take 4 mg by mouth every 6 hours as needed for Nausea.  Dose:  4 mg     oxyCODONE-acetaminophen 5-325 MG Tabs  Commonly known as:  PERCOCET   Take 1-2 Tabs by mouth every 6 hours as needed for Severe Pain.  Dose:  1-2 Tab     polyethylene glycol/lytes Pack  Commonly known as:  MIRALAX   Take 17 g by mouth as needed. Indications: Constipation  Dose:  17 g     VITAMIN D PO   Take 1 Cap by mouth every evening.  Dose:  1 Cap            Allergies  No Known Allergies    DIET  Orders Placed This Encounter   Procedures   • Diet Order Regular     Standing Status:   Standing     Number of Occurrences:   1     Order Specific Question:   Diet:     Answer:   Regular [1]       ACTIVITY  As tolerated.  Weight bearing as tolerated    CONSULTATIONS  none    PROCEDURES  none    LABORATORY  Lab Results   Component Value Date     SODIUM 135 11/27/2019    POTASSIUM 4.4 11/27/2019    CHLORIDE 100 11/27/2019    CO2 20 11/27/2019    GLUCOSE 96 11/27/2019    BUN 8 11/27/2019    CREATININE 0.54 11/27/2019        Lab Results   Component Value Date    WBC 7.4 11/27/2019    HEMOGLOBIN 13.1 11/27/2019    HEMATOCRIT 38.7 11/27/2019    PLATELETCT 153 (L) 11/27/2019        Total time of the discharge process exceeds 32 minutes.

## 2019-12-03 ENCOUNTER — OFFICE VISIT (OUTPATIENT)
Dept: MEDICAL GROUP | Facility: MEDICAL CENTER | Age: 49
End: 2019-12-03
Payer: COMMERCIAL

## 2019-12-03 VITALS
DIASTOLIC BLOOD PRESSURE: 76 MMHG | HEIGHT: 67 IN | WEIGHT: 157 LBS | TEMPERATURE: 98.6 F | SYSTOLIC BLOOD PRESSURE: 108 MMHG | BODY MASS INDEX: 24.64 KG/M2

## 2019-12-03 DIAGNOSIS — D68.51 FACTOR 5 LEIDEN MUTATION, HETEROZYGOUS (HCC): ICD-10-CM

## 2019-12-03 DIAGNOSIS — I26.01 ACUTE SEPTIC PULMONARY EMBOLISM WITH ACUTE COR PULMONALE (HCC): ICD-10-CM

## 2019-12-03 PROCEDURE — 99214 OFFICE O/P EST MOD 30 MIN: CPT | Performed by: FAMILY MEDICINE

## 2019-12-03 NOTE — PROGRESS NOTES
"Subjective:   Marcella Barajas is a 49 y.o. female here today for acute PE    Patient has history of factor V Leyden mutation, heterogeneous without history of blood clot until 1 week ago.  Patient had abdominal plasty 2 weeks ago.  She developed chest pain and difficulty taking a full breath because of sharp left-sided chest pain.  She was also short of breath at the time.  Patient was diagnosed with bilateral pulmonary emboli on CT scan.  She was started on Xarelto.  She has been tolerating Xarelto 50 mg twice daily for the last 1 week without any side effects or bleeding.  She states that her shortness of breath and sharp chest pain have both improved significantly.  She enjoys playing volleyball, but admits to a lot of bruising when playing volleyball even without blood thinners.  She is questioning if she can go back to playing volleyball.    Current medicines (including changes today)  Current Outpatient Medications   Medication Sig Dispense Refill   • rivaroxaban (XARELTO) 20 MG Tab tablet Take 1 Tab by mouth with dinner. 90 Tab 1   • rivaroxaban (XARELTO) 15 MG Tab tablet Take 1 Tab by mouth 2 times a day, with meals. 42 Tab 0   • Multiple Vitamin (MULTI-VITAMIN PO) Take 1 Tab by mouth every evening.     • VITAMIN D PO Take 1 Cap by mouth every evening.     • Docusate Sodium (COLACE PO) Take 2 Caps by mouth every evening.       No current facility-administered medications for this visit.      She  has a past medical history of Anesthesia and Factor 5 Leiden mutation, heterozygous (HCC).    ROS   No chest pain currently, no shortness of breath currently       Objective:     /76 (BP Location: Right arm, Patient Position: Sitting)   Temp 37 °C (98.6 °F)   Ht 1.702 m (5' 7\")   Wt 71.2 kg (157 lb)  Body mass index is 24.59 kg/m².   Physical Exam:  Constitutional: Alert, no distress.  Skin: Warm, dry, good turgor, no rashes in visible areas.  Eye: Equal, round and reactive, conjunctiva clear, " lids normal.  Psych: Alert and oriented x3, normal affect and mood.        Assessment and Plan:   The following treatment plan was discussed    1. Acute septic pulmonary embolism with acute cor pulmonale (HCC)  New problem, provoked PE.  Discussed risk of Xarelto with potential complications including intracranial bleed and GI bleeding.  She will seek emergency care if she develops any of these risks or symptoms.  We will have patient on Xarelto 20 mg daily for the next 6 months after she completes a 21-day course of 15 mg twice daily.  She will then go on to aspirin 81 mg daily.  Patient agrees with plan.  Avoid volleyball and high risk activity until she is off Xarelto, which she enjoys.  - rivaroxaban (XARELTO) 20 MG Tab tablet; Take 1 Tab by mouth with dinner.  Dispense: 90 Tab; Refill: 1    2. Factor 5 Leiden mutation, heterozygous (HCC)  No problems with blood clotting prior to recent surgery.  We will treat with Xarelto then switch patient to aspirin.      Followup: Return if symptoms worsen or fail to improve.

## 2019-12-10 NOTE — DOCUMENTATION QUERY
[facility]                                                                       Query Response Note      PATIENT:               ROBIN MCGREGOR  ACCT #:                  [id number]  MRN:                     [id number]  :                      1970  ADMIT DATE:       2019 11:47 AM  DISCH DATE:        2019 11:29 AM  RESPONDING  PROVIDER #:        [id number]           QUERY TEXT:    Please clarify in documentation the relationship, if any, between the pulmonary embolism and patient's abdominoplasty.    The patient's Clinical Indicators include:  Patient is a 48 y/o female who is heterozygous for factor V leiden muation who presents with an acute pulmonary embolism.  Patient is status post abdominoplasty 1 week prior to admission.  Patient was treated with a weigh-based dose of Lovenox and discharged on Xarelto.  Options provided:   -- Pulmonary embolism is due to or associated with abdominoplasty   -- Unrelated to each other   -- Unable to determine      Query created by: Windy Carrillo on 2019 11:40 AM    RESPONSE TEXT:    Pulmonary embolism is due to or associated with abdominoplasty          Electronically signed by:  IVON ARTHUR 12/10/2019 1:22 PM

## 2020-08-04 ENCOUNTER — HOSPITAL ENCOUNTER (OUTPATIENT)
Dept: RADIOLOGY | Facility: MEDICAL CENTER | Age: 50
End: 2020-08-04
Attending: NEUROLOGICAL SURGERY
Payer: COMMERCIAL

## 2020-08-04 DIAGNOSIS — M54.50 LOW BACK PAIN, UNSPECIFIED BACK PAIN LATERALITY, UNSPECIFIED CHRONICITY, UNSPECIFIED WHETHER SCIATICA PRESENT: ICD-10-CM

## 2020-08-04 PROCEDURE — 72110 X-RAY EXAM L-2 SPINE 4/>VWS: CPT

## 2020-08-10 ENCOUNTER — APPOINTMENT (RX ONLY)
Dept: URBAN - METROPOLITAN AREA CLINIC 35 | Facility: CLINIC | Age: 50
Setting detail: DERMATOLOGY
End: 2020-08-10

## 2020-08-10 DIAGNOSIS — L81.4 OTHER MELANIN HYPERPIGMENTATION: ICD-10-CM

## 2020-08-10 DIAGNOSIS — D22 MELANOCYTIC NEVI: ICD-10-CM

## 2020-08-10 DIAGNOSIS — Z71.89 OTHER SPECIFIED COUNSELING: ICD-10-CM

## 2020-08-10 PROBLEM — D22.5 MELANOCYTIC NEVI OF TRUNK: Status: ACTIVE | Noted: 2020-08-10

## 2020-08-10 PROBLEM — D23.72 OTHER BENIGN NEOPLASM OF SKIN OF LEFT LOWER LIMB, INCLUDING HIP: Status: ACTIVE | Noted: 2020-08-10

## 2020-08-10 PROBLEM — D22.71 MELANOCYTIC NEVI OF RIGHT LOWER LIMB, INCLUDING HIP: Status: ACTIVE | Noted: 2020-08-10

## 2020-08-10 PROBLEM — D22.62 MELANOCYTIC NEVI OF LEFT UPPER LIMB, INCLUDING SHOULDER: Status: ACTIVE | Noted: 2020-08-10

## 2020-08-10 PROBLEM — D22.61 MELANOCYTIC NEVI OF RIGHT UPPER LIMB, INCLUDING SHOULDER: Status: ACTIVE | Noted: 2020-08-10

## 2020-08-10 PROBLEM — D22.72 MELANOCYTIC NEVI OF LEFT LOWER LIMB, INCLUDING HIP: Status: ACTIVE | Noted: 2020-08-10

## 2020-08-10 PROBLEM — D22.122 MELANOCYTIC NEVI OF LEFT LOWER EYELID, INCLUDING CANTHUS: Status: ACTIVE | Noted: 2020-08-10

## 2020-08-10 PROBLEM — D22.4 MELANOCYTIC NEVI OF SCALP AND NECK: Status: ACTIVE | Noted: 2020-08-10

## 2020-08-10 PROCEDURE — ? OBSERVATION AND MEASURE

## 2020-08-10 PROCEDURE — 99214 OFFICE O/P EST MOD 30 MIN: CPT

## 2020-08-10 PROCEDURE — ? COUNSELING

## 2020-08-10 ASSESSMENT — LOCATION DETAILED DESCRIPTION DERM
LOCATION DETAILED: LEFT PROXIMAL POSTERIOR UPPER ARM
LOCATION DETAILED: RIGHT PROXIMAL POSTERIOR UPPER ARM
LOCATION DETAILED: RIGHT CENTRAL ANTERIOR NECK
LOCATION DETAILED: RIGHT CENTRAL LATERAL NECK
LOCATION DETAILED: LEFT ANTERIOR DISTAL THIGH
LOCATION DETAILED: LEFT LATERAL INFERIOR EYELID
LOCATION DETAILED: RIGHT PROXIMAL PRETIBIAL REGION
LOCATION DETAILED: RIGHT PROXIMAL CALF
LOCATION DETAILED: RIGHT INFERIOR LATERAL NECK
LOCATION DETAILED: RIGHT ANTERIOR SHOULDER
LOCATION DETAILED: SUPERIOR THORACIC SPINE
LOCATION DETAILED: MIDDLE STERNUM
LOCATION DETAILED: LEFT POPLITEAL SKIN

## 2020-08-10 ASSESSMENT — LOCATION SIMPLE DESCRIPTION DERM
LOCATION SIMPLE: LEFT POSTERIOR UPPER ARM
LOCATION SIMPLE: LEFT POPLITEAL SKIN
LOCATION SIMPLE: RIGHT SHOULDER
LOCATION SIMPLE: LEFT THIGH
LOCATION SIMPLE: CHEST
LOCATION SIMPLE: UPPER BACK
LOCATION SIMPLE: RIGHT PRETIBIAL REGION
LOCATION SIMPLE: LEFT INFERIOR EYELID
LOCATION SIMPLE: NECK
LOCATION SIMPLE: RIGHT ANTERIOR NECK
LOCATION SIMPLE: RIGHT CALF
LOCATION SIMPLE: RIGHT POSTERIOR UPPER ARM

## 2020-08-10 ASSESSMENT — LOCATION ZONE DERM
LOCATION ZONE: ARM
LOCATION ZONE: LEG
LOCATION ZONE: TRUNK
LOCATION ZONE: EYELID
LOCATION ZONE: NECK

## 2020-08-24 ENCOUNTER — PATIENT MESSAGE (OUTPATIENT)
Dept: MEDICAL GROUP | Facility: MEDICAL CENTER | Age: 50
End: 2020-08-24

## 2020-08-25 RX ORDER — AMOXICILLIN 875 MG/1
875 TABLET, COATED ORAL 2 TIMES DAILY
Qty: 20 TAB | Refills: 0 | Status: SHIPPED | OUTPATIENT
Start: 2020-08-25 | End: 2020-09-04

## 2020-09-03 ENCOUNTER — APPOINTMENT (OUTPATIENT)
Dept: RADIOLOGY | Facility: MEDICAL CENTER | Age: 50
End: 2020-09-03
Attending: PHYSICIAN ASSISTANT
Payer: COMMERCIAL

## 2020-09-03 DIAGNOSIS — M46.96 INFLAMMATION OF LUMBAR SPINE (HCC): ICD-10-CM

## 2020-09-03 DIAGNOSIS — M51.9 LUMBAR DISC DISEASE: ICD-10-CM

## 2020-09-03 DIAGNOSIS — M47.816 SPONDYLOSIS OF LUMBAR REGION WITHOUT MYELOPATHY OR RADICULOPATHY: ICD-10-CM

## 2020-09-03 PROCEDURE — 72148 MRI LUMBAR SPINE W/O DYE: CPT

## 2020-09-18 ENCOUNTER — HOSPITAL ENCOUNTER (OUTPATIENT)
Dept: RADIOLOGY | Facility: MEDICAL CENTER | Age: 50
End: 2020-09-18
Attending: FAMILY MEDICINE
Payer: COMMERCIAL

## 2020-09-18 DIAGNOSIS — Z12.31 VISIT FOR SCREENING MAMMOGRAM: ICD-10-CM

## 2020-09-18 PROCEDURE — 77067 SCR MAMMO BI INCL CAD: CPT

## 2021-01-29 ENCOUNTER — TELEPHONE (OUTPATIENT)
Dept: SCHEDULING | Facility: IMAGING CENTER | Age: 51
End: 2021-01-29

## 2021-02-25 ASSESSMENT — ENCOUNTER SYMPTOMS
CHILLS: 0
SHORTNESS OF BREATH: 0
DIARRHEA: 0
NAUSEA: 0
PALPITATIONS: 0
BLURRED VISION: 0
DEPRESSION: 0
WEAKNESS: 0
FEVER: 0
VOMITING: 0

## 2021-02-26 NOTE — PROGRESS NOTES
History of Present Illness  51 year old female presents to clinic to establish care.  She does have factor V Leiden mutation, with a history of pulmonary embolism after surgery.  This was treated with 6 months of Xarelto.  She is not currently on any anticoagulation and is doing well.    Also has a history of vitamin D deficiency, for which she is taking over-the-counter supplementation.  She has no recent labs indicating her current vitamin D levels.  She denies any side effects from her vitamin D.    She has chronic constipation and is using Colace for this.  She has been doing this daily for about 21 years.  On this regimen she does have regular, soft bowel movements.    She denies any other questions or concerns at this time.    ROS  Review of Systems   Constitutional: Negative for chills and fever.   HENT: Negative for hearing loss.    Eyes: Negative for blurred vision.   Respiratory: Negative for shortness of breath.    Cardiovascular: Negative for chest pain and palpitations.   Gastrointestinal: Positive for constipation (controlled with Colace). Negative for diarrhea, nausea and vomiting.   Genitourinary: Negative for dysuria and hematuria.   Skin: Negative for rash.   Neurological: Negative for weakness.   Psychiatric/Behavioral: Negative for depression.     Medications  Current Outpatient Medications   Medication Sig Dispense Refill   • Multiple Vitamin (MULTI-VITAMIN PO) Take 1 Tab by mouth every evening.     • VITAMIN D PO Take 1 Cap by mouth every evening.     • Docusate Sodium (COLACE PO) Take 2 Caps by mouth every evening.       No current facility-administered medications for this visit.     Allergies  No Known Allergies    Problem List  Patient Active Problem List   Diagnosis   • Vitamin D insufficiency   • Factor 5 Leiden mutation, heterozygous (HCC)   • Chronic constipation   • History of pulmonary embolus (PE)   • Overweight (BMI 25.0-29.9)     Past Medical History  Past Medical History:  "  Diagnosis Date   • Anesthesia     nausea   • Factor 5 Leiden mutation, heterozygous (HCC)      Past Surgical History  Past Surgical History:   Procedure Laterality Date   • VAGINAL HYSTERECTOMY SCOPE TOTAL  1/26/2010    Performed by STEVEN LIAO at SURGERY SAME DAY [facility] ORS   • CYSTOSCOPY  1/26/2010    Performed by STEVEN LIAO at SURGERY SAME DAY [facility] ORS   • LAPAROSCOPY     • OTHER      lasik   • TONSILLECTOMY       Past Family History  Family History   Problem Relation Age of Onset   • Hypertension Mother    • Hypertension Father    • Pulmonary Embolism Brother    • No Known Problems Brother    • No Known Problems Son    • No Known Problems Son    • No Known Problems Son      Social History  She reports eating a healthy and balanced diet, as well as getting regular exercise, particularly through [employer] and golf. She works part time as an . She drinks an average of 8 alcoholic beverages per week. She denies any tobacco product or illicit drug use. She is sexually active with one, male partner and has had a hysterectomy for contraception.     Physical Exam  /78 (BP Location: Left arm, Patient Position: Sitting, BP Cuff Size: Adult)   Pulse 85   Temp 37.1 °C (98.7 °F) (Temporal)   Resp 15   Ht 1.676 m (5' 6\")   Wt 74 kg (163 lb 2.3 oz)   LMP 01/08/2010   SpO2 98%   BMI 26.33 kg/m²   Physical Exam   Constitutional: She is well-developed, well-nourished, and in no distress. No distress.   HENT:   Head: Normocephalic and atraumatic.   Right Ear: Tympanic membrane, external ear and ear canal normal.   Left Ear: Tympanic membrane, external ear and ear canal normal.   Eyes: Pupils are equal, round, and reactive to light. Right eye exhibits no discharge. Left eye exhibits no discharge. No scleral icterus.   Neck: No thyromegaly present.   Cardiovascular: Normal rate, regular rhythm and normal heart sounds.   Pulmonary/Chest: Effort normal and breath sounds normal. No " respiratory distress.   Abdominal: Soft. Bowel sounds are normal. She exhibits no distension. There is no abdominal tenderness.   Musculoskeletal:         General: No edema.   Neurological: She is alert.   Skin: Skin is warm and dry. She is not diaphoretic.   Psychiatric: Affect and judgment normal.     Assessment & Plan  1. Visit for preventive health examination  2. Overweight (BMI 25.0-29.9)  Health maintenance status reviewed and updated. We discussed diet, exercise, vaccinations, skin cancer prevention and detection, seat belt use, and regular eye and dental exams.  - Comp Metabolic Panel; Future  - Lipid Profile; Future  - HEMOGLOBIN A1C; Future    2. Factor 5 Leiden mutation, heterozygous (HCC)  3. History of pulmonary embolus (PE)  Chronic and stable.  No need for anticoagulation.  Continue to monitor.    4. Vitamin D insufficiency  Chronic and stable.  We will continue with vitamin D supplementation.  Vitamin D level ordered.  - VITAMIN D,25 HYDROXY; Future    5. Chronic constipation  Chronic and stable.  Continue with Colace daily.    Return in about 1 year (around 3/3/2022) for Annual physical.    Winter Bobby M.D.

## 2021-03-02 PROBLEM — N64.4 BREAST PAIN IN FEMALE: Status: RESOLVED | Noted: 2017-10-02 | Resolved: 2021-03-02

## 2021-03-03 ENCOUNTER — OFFICE VISIT (OUTPATIENT)
Dept: MEDICAL GROUP | Facility: MEDICAL CENTER | Age: 51
End: 2021-03-03
Payer: COMMERCIAL

## 2021-03-03 DIAGNOSIS — E66.3 OVERWEIGHT (BMI 25.0-29.9): ICD-10-CM

## 2021-03-03 DIAGNOSIS — D68.51 FACTOR 5 LEIDEN MUTATION, HETEROZYGOUS (HCC): ICD-10-CM

## 2021-03-03 DIAGNOSIS — E55.9 VITAMIN D INSUFFICIENCY: ICD-10-CM

## 2021-03-03 DIAGNOSIS — K59.09 CHRONIC CONSTIPATION: ICD-10-CM

## 2021-03-03 DIAGNOSIS — Z00.00 VISIT FOR PREVENTIVE HEALTH EXAMINATION: ICD-10-CM

## 2021-03-03 DIAGNOSIS — Z86.711 HISTORY OF PULMONARY EMBOLUS (PE): ICD-10-CM

## 2021-03-03 PROBLEM — Z98.890 S/P ABDOMINOPLASTY: Status: RESOLVED | Noted: 2019-11-26 | Resolved: 2021-03-03

## 2021-03-03 PROCEDURE — 99396 PREV VISIT EST AGE 40-64: CPT | Performed by: FAMILY MEDICINE

## 2021-03-03 ASSESSMENT — FIBROSIS 4 INDEX: FIB4 SCORE: 1.666666666666666667

## 2021-03-03 ASSESSMENT — ENCOUNTER SYMPTOMS: CONSTIPATION: 1

## 2021-03-03 ASSESSMENT — PATIENT HEALTH QUESTIONNAIRE - PHQ9: CLINICAL INTERPRETATION OF PHQ2 SCORE: 0

## 2021-03-04 VITALS
HEART RATE: 85 BPM | TEMPERATURE: 98.7 F | BODY MASS INDEX: 25.61 KG/M2 | SYSTOLIC BLOOD PRESSURE: 118 MMHG | OXYGEN SATURATION: 98 % | WEIGHT: 163.14 LBS | HEIGHT: 67 IN | RESPIRATION RATE: 15 BRPM | DIASTOLIC BLOOD PRESSURE: 78 MMHG

## 2021-04-13 ENCOUNTER — HOSPITAL ENCOUNTER (OUTPATIENT)
Dept: LAB | Facility: MEDICAL CENTER | Age: 51
End: 2021-04-13
Attending: FAMILY MEDICINE
Payer: COMMERCIAL

## 2021-04-13 DIAGNOSIS — Z00.00 VISIT FOR PREVENTIVE HEALTH EXAMINATION: ICD-10-CM

## 2021-04-13 DIAGNOSIS — E55.9 VITAMIN D INSUFFICIENCY: ICD-10-CM

## 2021-04-13 LAB
ALBUMIN SERPL BCP-MCNC: 4.1 G/DL (ref 3.2–4.9)
ALBUMIN/GLOB SERPL: 1.6 G/DL
ALP SERPL-CCNC: 122 U/L (ref 30–99)
ALT SERPL-CCNC: 24 U/L (ref 2–50)
ANION GAP SERPL CALC-SCNC: 6 MMOL/L (ref 7–16)
AST SERPL-CCNC: 30 U/L (ref 12–45)
BILIRUB SERPL-MCNC: 0.3 MG/DL (ref 0.1–1.5)
BUN SERPL-MCNC: 15 MG/DL (ref 8–22)
CALCIUM SERPL-MCNC: 10.8 MG/DL (ref 8.5–10.5)
CHLORIDE SERPL-SCNC: 108 MMOL/L (ref 96–112)
CHOLEST SERPL-MCNC: 181 MG/DL (ref 100–199)
CO2 SERPL-SCNC: 27 MMOL/L (ref 20–33)
CREAT SERPL-MCNC: 0.74 MG/DL (ref 0.5–1.4)
EST. AVERAGE GLUCOSE BLD GHB EST-MCNC: 82 MG/DL
GLOBULIN SER CALC-MCNC: 2.6 G/DL (ref 1.9–3.5)
GLUCOSE SERPL-MCNC: 92 MG/DL (ref 65–99)
HBA1C MFR BLD: 4.5 % (ref 4–5.6)
HDLC SERPL-MCNC: 63 MG/DL
LDLC SERPL CALC-MCNC: 101 MG/DL
POTASSIUM SERPL-SCNC: 4.9 MMOL/L (ref 3.6–5.5)
PROT SERPL-MCNC: 6.7 G/DL (ref 6–8.2)
SODIUM SERPL-SCNC: 141 MMOL/L (ref 135–145)
TRIGL SERPL-MCNC: 83 MG/DL (ref 0–149)

## 2021-04-13 PROCEDURE — 80061 LIPID PANEL: CPT

## 2021-04-13 PROCEDURE — 36415 COLL VENOUS BLD VENIPUNCTURE: CPT

## 2021-04-13 PROCEDURE — 82306 VITAMIN D 25 HYDROXY: CPT

## 2021-04-13 PROCEDURE — 83036 HEMOGLOBIN GLYCOSYLATED A1C: CPT

## 2021-04-13 PROCEDURE — 80053 COMPREHEN METABOLIC PANEL: CPT

## 2021-04-15 LAB — 25(OH)D3 SERPL-MCNC: 32 NG/ML (ref 30–80)

## 2021-09-12 ENCOUNTER — OFFICE VISIT (OUTPATIENT)
Dept: URGENT CARE | Facility: CLINIC | Age: 51
End: 2021-09-12
Payer: COMMERCIAL

## 2021-09-12 ENCOUNTER — APPOINTMENT (OUTPATIENT)
Dept: RADIOLOGY | Facility: IMAGING CENTER | Age: 51
End: 2021-09-12
Attending: PHYSICIAN ASSISTANT
Payer: COMMERCIAL

## 2021-09-12 VITALS
WEIGHT: 158 LBS | TEMPERATURE: 97.9 F | HEART RATE: 95 BPM | OXYGEN SATURATION: 97 % | RESPIRATION RATE: 18 BRPM | BODY MASS INDEX: 23.95 KG/M2 | SYSTOLIC BLOOD PRESSURE: 110 MMHG | DIASTOLIC BLOOD PRESSURE: 68 MMHG | HEIGHT: 68 IN

## 2021-09-12 DIAGNOSIS — S82.65XA CLOSED NONDISPLACED FRACTURE OF LATERAL MALLEOLUS OF LEFT FIBULA, INITIAL ENCOUNTER: ICD-10-CM

## 2021-09-12 DIAGNOSIS — S99.912A INJURY OF LEFT ANKLE, INITIAL ENCOUNTER: ICD-10-CM

## 2021-09-12 DIAGNOSIS — S82.52XA DISPLACED FRACTURE OF MEDIAL MALLEOLUS OF LEFT TIBIA, INITIAL ENCOUNTER FOR CLOSED FRACTURE: ICD-10-CM

## 2021-09-12 PROCEDURE — 99214 OFFICE O/P EST MOD 30 MIN: CPT | Performed by: PHYSICIAN ASSISTANT

## 2021-09-12 PROCEDURE — 73610 X-RAY EXAM OF ANKLE: CPT | Mod: TC,LT | Performed by: PHYSICIAN ASSISTANT

## 2021-09-12 ASSESSMENT — FIBROSIS 4 INDEX: FIB4 SCORE: 2.041241452319315083

## 2021-09-12 NOTE — PROGRESS NOTES
"Subjective:   Marcella Barajas is a 51 y.o. female who presents for Ankle Injury (Left ankle injury at noon today, NEELIMA)      HPI  Patient is a 51-year-old female who presents to clinic with complaints of left ankle pain onset today.  She states she was playing volleyball jumped up in the air and landed on another player's foot causing her ankle to tweak.  She felt a pop and developed immediate pain afterwards.  She was unable to walk due to the pain.  She denies any numbness, tingling, weakness.  Ice helps with the pain.  She took 600 mg ibuprofen x1 with some relief.  Pain is worse with movement and ambulation.  No other injuries.      Medications:    • COLACE PO  • MULTI-VITAMIN PO  • VITAMIN D PO    Allergies: Patient has no known allergies.    Problem List: Marcella Barajas does not have any pertinent problems on file.    Surgical History:  Past Surgical History:   Procedure Laterality Date   • VAGINAL HYSTERECTOMY SCOPE TOTAL  1/26/2010    Performed by STEVEN LIAO at SURGERY SAME DAY [facility] ORS   • CYSTOSCOPY  1/26/2010    Performed by STEVEN LIAO at SURGERY SAME DAY [facility] ORS   • LAPAROSCOPY     • OTHER      lasik   • TONSILLECTOMY         Past Social Hx: Marcella Barajas  reports that she has never smoked. She has never used smokeless tobacco. She reports current alcohol use of about 7.0 oz of alcohol per week. She reports that she does not use drugs.     Past Family Hx:  Marcella Barajas family history includes Hypertension in her father and mother; No Known Problems in her brother, son, son, and son; Pulmonary Embolism in her brother.     Problem list, medications, and allergies reviewed by myself today in Epic.     Objective:     /68 (BP Location: Left arm, Patient Position: Sitting, BP Cuff Size: Adult)   Pulse 95   Temp 36.6 °C (97.9 °F) (Temporal)   Resp 18   Ht 1.727 m (5' 8\")   Wt 71.7 kg (158 lb)   LMP 01/08/2010   SpO2 97%   " BMI 24.02 kg/m²     Physical Exam  Vitals reviewed.   Constitutional:       General: She is not in acute distress.     Appearance: Normal appearance. She is not ill-appearing or toxic-appearing.   Eyes:      Conjunctiva/sclera: Conjunctivae normal.      Pupils: Pupils are equal, round, and reactive to light.   Cardiovascular:      Rate and Rhythm: Normal rate.   Pulmonary:      Effort: Pulmonary effort is normal.   Musculoskeletal:      Cervical back: Neck supple.   Feet:      Comments: Left ankle: Significant tenderness to palpation over the medial and lateral malleolus. Lateral and medial soft tissue swelling without ecchymosis. Possible slight deformity to medial ankle. No other point bony tenderness of ankle, foot, or leg. Full ROM. decreased strength secondary to pain. Distal neuro/vascular intact. Capillary refill < 2 seconds. Skin intact. Negative squeeze test.     Skin:     General: Skin is warm and dry.   Neurological:      General: No focal deficit present.      Mental Status: She is alert and oriented to person, place, and time.   Psychiatric:         Mood and Affect: Mood normal.         Behavior: Behavior normal.         RADIOLOGY RESULTS   DX-ANKLE 3+ VIEWS LEFT    Result Date: 9/12/2021 9/12/2021 3:18 PM HISTORY/REASON FOR EXAM: Pain/Deformity Following Trauma TECHNIQUE/EXAM DESCRIPTION AND NUMBER OF VIEWS: 3 nonweightbearing views of the   LEFT ankle.   COMPARISON: None   FINDINGS: There is moderate medial soft tissue swelling. There is a medial malleolus base fracture which is displaced 6 mm. There is a fibular fracture which appears transverse orientation at the level of the tibial plafond. This is a nondisplaced There is no syndesmotic widening.     Acute medial malleolus fracture is displaced 6 mm   Acute nondisplaced fibular fracture, Lau A         SPLINT PLACEMENT:  The patient was placed in a Posterior splint with ankle at 90 degrees and the splint was applied by myself with MA. Following  splint application I rechecked the position and circulation and neuro function. The splint was in good position with good neurovascular function. The ankle was well immobilized.    Diagnosis and associated orders:     1. Displaced fracture of medial malleolus of left tibia, initial encounter for closed fracture  REFERRAL TO ORTHOPEDICS    CANCELED: REFERRAL TO ORTHOPEDICS   2. Closed nondisplaced fracture of lateral malleolus of left fibula, initial encounter  DX-ANKLE 3+ VIEWS LEFT    REFERRAL TO ORTHOPEDICS    CANCELED: REFERRAL TO ORTHOPEDICS      Comments/MDM:     • X-ray results per radiologist interpretation above. I personally reviewed images and radiologist report.   • Splint placement as above.   • Patient has her own crutches and discussed nonweightbearing.   • Her pain seems to be well controlled here in the clinic.   • Recommended Rest, Elevation at the level of the heart or above, Ibuprofen 800 mg PO every 8 hours. She may alternate with Tylenol 1000 mg PO.   • Urgent Referral to Ortho placed for further evaluation and treatment.        I personally reviewed prior external notes and test results pertinent to today's visit. Red flags discussed and indications to present to the Emergency Department. Supportive care, natural history, differential diagnoses, and indications for immediate follow-up discussed. Patient expresses understanding and agrees to plan. Patient denies any other questions or concerns.       Please note that this dictation was created using voice recognition software. I have made a reasonable attempt to correct obvious errors, but I expect that there are errors of grammar and possibly content that I did not discover before finalizing the note.    This note was electronically signed by Adán Rivera PA-C

## 2021-09-17 ENCOUNTER — PRE-ADMISSION TESTING (OUTPATIENT)
Dept: ADMISSIONS | Facility: MEDICAL CENTER | Age: 51
End: 2021-09-17
Attending: ORTHOPAEDIC SURGERY
Payer: COMMERCIAL

## 2021-09-17 DIAGNOSIS — Z01.812 PRE-OPERATIVE LABORATORY EXAMINATION: ICD-10-CM

## 2021-09-17 PROCEDURE — U0003 INFECTIOUS AGENT DETECTION BY NUCLEIC ACID (DNA OR RNA); SEVERE ACUTE RESPIRATORY SYNDROME CORONAVIRUS 2 (SARS-COV-2) (CORONAVIRUS DISEASE [COVID-19]), AMPLIFIED PROBE TECHNIQUE, MAKING USE OF HIGH THROUGHPUT TECHNOLOGIES AS DESCRIBED BY CMS-2020-01-R: HCPCS

## 2021-09-17 PROCEDURE — U0005 INFEC AGEN DETEC AMPLI PROBE: HCPCS

## 2021-09-17 RX ORDER — TRAMADOL HYDROCHLORIDE 50 MG/1
TABLET ORAL
COMMUNITY
Start: 2021-09-14 | End: 2021-10-20

## 2021-09-17 RX ORDER — ACETAMINOPHEN 500 MG
1000 TABLET ORAL EVERY 6 HOURS PRN
COMMUNITY
End: 2021-10-20

## 2021-09-17 RX ORDER — ONDANSETRON 4 MG/1
4 TABLET, ORALLY DISINTEGRATING ORAL EVERY 8 HOURS PRN
COMMUNITY
Start: 2021-09-14 | End: 2021-10-20

## 2021-09-17 RX ORDER — OXYCODONE HYDROCHLORIDE 5 MG/1
TABLET ORAL
COMMUNITY
Start: 2021-09-14 | End: 2021-10-20

## 2021-09-17 RX ORDER — RIVAROXABAN 10 MG/1
10 TABLET, FILM COATED ORAL
COMMUNITY
Start: 2021-09-15 | End: 2021-11-01

## 2021-09-17 ASSESSMENT — FIBROSIS 4 INDEX: FIB4 SCORE: 2.041241452319315083

## 2021-09-17 NOTE — OR NURSING
"Preadmit appointment: \" Preparing for your Procedure information\" sheet given to patient with verbal and written instructions. Patient instructed to continue prescribed medications through the day before surgery, instructed to take the following medications the day of surgery per anesthesia protocol:   TYLENOL, COLACE, BACTROBAN, ZOFRAN         Verbal and written, and pre-admit video website instructions provided on covid symptoms to watch for given to patient, pt advised to notify MD if any symptoms develop. Verbal instructions given to follow the [place] mask mandate and encouraged to avoid crowds. Also verbally instructed to wear a mask when with person known not to have had the Covid vaccine.    PT WATCHED THE PRE-ADMIT VIDEO    "

## 2021-09-19 LAB
SARS-COV-2 RNA RESP QL NAA+PROBE: NOTDETECTED
SPECIMEN SOURCE: NORMAL

## 2021-09-20 ENCOUNTER — ANESTHESIA EVENT (OUTPATIENT)
Dept: SURGERY | Facility: MEDICAL CENTER | Age: 51
End: 2021-09-20
Payer: COMMERCIAL

## 2021-09-21 ENCOUNTER — APPOINTMENT (OUTPATIENT)
Dept: RADIOLOGY | Facility: MEDICAL CENTER | Age: 51
End: 2021-09-21
Attending: ORTHOPAEDIC SURGERY
Payer: COMMERCIAL

## 2021-09-21 ENCOUNTER — HOSPITAL ENCOUNTER (OUTPATIENT)
Facility: MEDICAL CENTER | Age: 51
End: 2021-09-21
Attending: ORTHOPAEDIC SURGERY | Admitting: ORTHOPAEDIC SURGERY
Payer: COMMERCIAL

## 2021-09-21 ENCOUNTER — ANESTHESIA (OUTPATIENT)
Dept: SURGERY | Facility: MEDICAL CENTER | Age: 51
End: 2021-09-21
Payer: COMMERCIAL

## 2021-09-21 VITALS
BODY MASS INDEX: 24.79 KG/M2 | RESPIRATION RATE: 14 BRPM | HEART RATE: 69 BPM | SYSTOLIC BLOOD PRESSURE: 91 MMHG | HEIGHT: 68 IN | OXYGEN SATURATION: 97 % | TEMPERATURE: 98.2 F | DIASTOLIC BLOOD PRESSURE: 45 MMHG | WEIGHT: 163.58 LBS

## 2021-09-21 PROBLEM — Z98.890 PONV (POSTOPERATIVE NAUSEA AND VOMITING): Status: ACTIVE | Noted: 2021-09-21

## 2021-09-21 PROBLEM — R11.2 PONV (POSTOPERATIVE NAUSEA AND VOMITING): Status: ACTIVE | Noted: 2021-09-21

## 2021-09-21 PROCEDURE — 160046 HCHG PACU - 1ST 60 MINS PHASE II: Performed by: ORTHOPAEDIC SURGERY

## 2021-09-21 PROCEDURE — C1769 GUIDE WIRE: HCPCS | Performed by: ORTHOPAEDIC SURGERY

## 2021-09-21 PROCEDURE — 500881 HCHG PACK, EXTREMITY: Performed by: ORTHOPAEDIC SURGERY

## 2021-09-21 PROCEDURE — C1713 ANCHOR/SCREW BN/BN,TIS/BN: HCPCS | Performed by: ORTHOPAEDIC SURGERY

## 2021-09-21 PROCEDURE — 160025 RECOVERY II MINUTES (STATS): Performed by: ORTHOPAEDIC SURGERY

## 2021-09-21 PROCEDURE — 64447 NJX AA&/STRD FEMORAL NRV IMG: CPT | Performed by: ORTHOPAEDIC SURGERY

## 2021-09-21 PROCEDURE — 700111 HCHG RX REV CODE 636 W/ 250 OVERRIDE (IP): Performed by: ANESTHESIOLOGY

## 2021-09-21 PROCEDURE — 700101 HCHG RX REV CODE 250: Performed by: ORTHOPAEDIC SURGERY

## 2021-09-21 PROCEDURE — 160009 HCHG ANES TIME/MIN: Performed by: ORTHOPAEDIC SURGERY

## 2021-09-21 PROCEDURE — 501838 HCHG SUTURE GENERAL: Performed by: ORTHOPAEDIC SURGERY

## 2021-09-21 PROCEDURE — 160035 HCHG PACU - 1ST 60 MINS PHASE I: Performed by: ORTHOPAEDIC SURGERY

## 2021-09-21 PROCEDURE — 73610 X-RAY EXAM OF ANKLE: CPT | Mod: LT

## 2021-09-21 PROCEDURE — 700102 HCHG RX REV CODE 250 W/ 637 OVERRIDE(OP): Performed by: ANESTHESIOLOGY

## 2021-09-21 PROCEDURE — 160048 HCHG OR STATISTICAL LEVEL 1-5: Performed by: ORTHOPAEDIC SURGERY

## 2021-09-21 PROCEDURE — 160029 HCHG SURGERY MINUTES - 1ST 30 MINS LEVEL 4: Performed by: ORTHOPAEDIC SURGERY

## 2021-09-21 PROCEDURE — A9270 NON-COVERED ITEM OR SERVICE: HCPCS | Performed by: ANESTHESIOLOGY

## 2021-09-21 PROCEDURE — 160041 HCHG SURGERY MINUTES - EA ADDL 1 MIN LEVEL 4: Performed by: ORTHOPAEDIC SURGERY

## 2021-09-21 PROCEDURE — 64445 NJX AA&/STRD SCIATIC NRV IMG: CPT | Performed by: ORTHOPAEDIC SURGERY

## 2021-09-21 PROCEDURE — 160002 HCHG RECOVERY MINUTES (STAT): Performed by: ORTHOPAEDIC SURGERY

## 2021-09-21 PROCEDURE — 700105 HCHG RX REV CODE 258: Performed by: ORTHOPAEDIC SURGERY

## 2021-09-21 PROCEDURE — A6454 SELF-ADHER BAND W>=3" <5"/YD: HCPCS | Performed by: ORTHOPAEDIC SURGERY

## 2021-09-21 DEVICE — IMPLANTABLE DEVICE: Type: IMPLANTABLE DEVICE | Site: ANKLE | Status: FUNCTIONAL

## 2021-09-21 RX ORDER — OXYCODONE HCL 5 MG/5 ML
5 SOLUTION, ORAL ORAL
Status: DISCONTINUED | OUTPATIENT
Start: 2021-09-21 | End: 2021-09-21 | Stop reason: HOSPADM

## 2021-09-21 RX ORDER — DIPHENHYDRAMINE HYDROCHLORIDE 50 MG/ML
12.5 INJECTION INTRAMUSCULAR; INTRAVENOUS
Status: DISCONTINUED | OUTPATIENT
Start: 2021-09-21 | End: 2021-09-21 | Stop reason: HOSPADM

## 2021-09-21 RX ORDER — ACETAMINOPHEN 500 MG
1000 TABLET ORAL ONCE
Status: COMPLETED | OUTPATIENT
Start: 2021-09-21 | End: 2021-09-21

## 2021-09-21 RX ORDER — OXYCODONE HCL 5 MG/5 ML
10 SOLUTION, ORAL ORAL
Status: DISCONTINUED | OUTPATIENT
Start: 2021-09-21 | End: 2021-09-21 | Stop reason: HOSPADM

## 2021-09-21 RX ORDER — SCOLOPAMINE TRANSDERMAL SYSTEM 1 MG/1
1 PATCH, EXTENDED RELEASE TRANSDERMAL
Status: DISCONTINUED | OUTPATIENT
Start: 2021-09-21 | End: 2021-09-21 | Stop reason: HOSPADM

## 2021-09-21 RX ORDER — SODIUM CHLORIDE, SODIUM LACTATE, POTASSIUM CHLORIDE, CALCIUM CHLORIDE 600; 310; 30; 20 MG/100ML; MG/100ML; MG/100ML; MG/100ML
INJECTION, SOLUTION INTRAVENOUS CONTINUOUS
Status: ACTIVE | OUTPATIENT
Start: 2021-09-21 | End: 2021-09-21

## 2021-09-21 RX ORDER — ONDANSETRON 2 MG/ML
INJECTION INTRAMUSCULAR; INTRAVENOUS PRN
Status: DISCONTINUED | OUTPATIENT
Start: 2021-09-21 | End: 2021-09-21 | Stop reason: SURG

## 2021-09-21 RX ORDER — CEFAZOLIN SODIUM 1 G/3ML
INJECTION, POWDER, FOR SOLUTION INTRAMUSCULAR; INTRAVENOUS PRN
Status: DISCONTINUED | OUTPATIENT
Start: 2021-09-21 | End: 2021-09-21 | Stop reason: SURG

## 2021-09-21 RX ORDER — DEXAMETHASONE SODIUM PHOSPHATE 4 MG/ML
INJECTION, SOLUTION INTRA-ARTICULAR; INTRALESIONAL; INTRAMUSCULAR; INTRAVENOUS; SOFT TISSUE PRN
Status: DISCONTINUED | OUTPATIENT
Start: 2021-09-21 | End: 2021-09-21 | Stop reason: SURG

## 2021-09-21 RX ORDER — HALOPERIDOL 5 MG/ML
1 INJECTION INTRAMUSCULAR
Status: DISCONTINUED | OUTPATIENT
Start: 2021-09-21 | End: 2021-09-21 | Stop reason: HOSPADM

## 2021-09-21 RX ORDER — LORAZEPAM 2 MG/ML
0.5 INJECTION INTRAMUSCULAR
Status: DISCONTINUED | OUTPATIENT
Start: 2021-09-21 | End: 2021-09-21 | Stop reason: HOSPADM

## 2021-09-21 RX ORDER — KETOROLAC TROMETHAMINE 30 MG/ML
INJECTION, SOLUTION INTRAMUSCULAR; INTRAVENOUS PRN
Status: DISCONTINUED | OUTPATIENT
Start: 2021-09-21 | End: 2021-09-21 | Stop reason: SURG

## 2021-09-21 RX ORDER — ROPIVACAINE HYDROCHLORIDE 5 MG/ML
INJECTION, SOLUTION EPIDURAL; INFILTRATION; PERINEURAL PRN
Status: DISCONTINUED | OUTPATIENT
Start: 2021-09-21 | End: 2021-09-21 | Stop reason: SURG

## 2021-09-21 RX ORDER — CELECOXIB 200 MG/1
400 CAPSULE ORAL ONCE
Status: COMPLETED | OUTPATIENT
Start: 2021-09-21 | End: 2021-09-21

## 2021-09-21 RX ORDER — PHENYLEPHRINE HCL IN 0.9% NACL 0.5 MG/5ML
SYRINGE (ML) INTRAVENOUS PRN
Status: DISCONTINUED | OUTPATIENT
Start: 2021-09-21 | End: 2021-09-21 | Stop reason: SURG

## 2021-09-21 RX ORDER — ONDANSETRON 2 MG/ML
4 INJECTION INTRAMUSCULAR; INTRAVENOUS
Status: DISCONTINUED | OUTPATIENT
Start: 2021-09-21 | End: 2021-09-21 | Stop reason: HOSPADM

## 2021-09-21 RX ADMIN — Medication 100 MCG: at 13:43

## 2021-09-21 RX ADMIN — CELECOXIB 400 MG: 200 CAPSULE ORAL at 11:58

## 2021-09-21 RX ADMIN — SCOPALAMINE 1 PATCH: 1 PATCH, EXTENDED RELEASE TRANSDERMAL at 11:58

## 2021-09-21 RX ADMIN — KETOROLAC TROMETHAMINE 15 MG: 30 INJECTION, SOLUTION INTRAMUSCULAR at 14:02

## 2021-09-21 RX ADMIN — LIDOCAINE HYDROCHLORIDE 0.5 ML: 10 INJECTION, SOLUTION INFILTRATION; PERINEURAL at 11:58

## 2021-09-21 RX ADMIN — ONDANSETRON 4 MG: 2 INJECTION INTRAMUSCULAR; INTRAVENOUS at 14:04

## 2021-09-21 RX ADMIN — Medication 200 MCG: at 13:26

## 2021-09-21 RX ADMIN — ROPIVACAINE HYDROCHLORIDE 10 ML: 5 INJECTION, SOLUTION EPIDURAL; INFILTRATION; PERINEURAL at 12:44

## 2021-09-21 RX ADMIN — ACETAMINOPHEN 1000 MG: 500 TABLET, FILM COATED ORAL at 11:58

## 2021-09-21 RX ADMIN — SODIUM CHLORIDE, POTASSIUM CHLORIDE, SODIUM LACTATE AND CALCIUM CHLORIDE: 600; 310; 30; 20 INJECTION, SOLUTION INTRAVENOUS at 11:58

## 2021-09-21 RX ADMIN — Medication 200 MCG: at 13:19

## 2021-09-21 RX ADMIN — ROPIVACAINE HYDROCHLORIDE 10 ML: 5 INJECTION, SOLUTION EPIDURAL; INFILTRATION; PERINEURAL at 12:45

## 2021-09-21 RX ADMIN — DEXAMETHASONE SODIUM PHOSPHATE 4 MG: 4 INJECTION, SOLUTION INTRAMUSCULAR; INTRAVENOUS at 13:16

## 2021-09-21 RX ADMIN — PROPOFOL 150 MG: 10 INJECTION, EMULSION INTRAVENOUS at 13:14

## 2021-09-21 RX ADMIN — ROPIVACAINE HYDROCHLORIDE 10 ML: 5 INJECTION, SOLUTION EPIDURAL; INFILTRATION; PERINEURAL at 12:51

## 2021-09-21 RX ADMIN — FENTANYL CITRATE 100 MCG: 50 INJECTION, SOLUTION INTRAMUSCULAR; INTRAVENOUS at 13:10

## 2021-09-21 RX ADMIN — CEFAZOLIN 2 G: 330 INJECTION, POWDER, FOR SOLUTION INTRAMUSCULAR; INTRAVENOUS at 13:14

## 2021-09-21 ASSESSMENT — PAIN SCALES - GENERAL: PAIN_LEVEL: 0

## 2021-09-21 ASSESSMENT — PAIN DESCRIPTION - PAIN TYPE: TYPE: SURGICAL PAIN

## 2021-09-21 ASSESSMENT — FIBROSIS 4 INDEX: FIB4 SCORE: 2.041241452319315083

## 2021-09-21 NOTE — ANESTHESIA TIME REPORT
Anesthesia Start and Stop Event Times     Date Time Event    9/21/2021 1250 Ready for Procedure     1308 Anesthesia Start     1422 Anesthesia Stop        Responsible Staff  09/21/21    Name Role Begin End    Lauryn Kennedy M.D. Anesth 1308 1422        Preop Diagnosis (Free Text):  Pre-op Diagnosis     BIMALLEOLAR FRACTURE OF ANKLE - LEFT        Preop Diagnosis (Codes):    Post op Diagnosis  Bimalleolar ankle fracture      Premium Reason  Non-Premium    Comments:

## 2021-09-21 NOTE — OR SURGEON
Immediate Post OP Note    PreOp Diagnosis: left bimalleolar ankle fracture       PostOp Diagnosis: same       Procedure(s):  ORIF, ANKLE - MEDIAL MEALLEOLOUS  - nonop stable lateral malleolar fracture - Wound Class: Clean    Surgeon(s):  Jesús Holley M.D.    Anesthesiologist/Type of Anesthesia:  Anesthesiologist: Lauryn Kennedy M.D./General    Surgical Staff:  Assistant: Dania Trevizo CFA  Circulator: Alecia Russell R.N.  Scrub Person: Chip Marie  Radiology Technologist: Filippo Cordova    Specimens removed if any:  * No specimens in log *    Estimated Blood Loss: minimal   TT 31 min    Findings: displaced medial mal fracture , stable lateral mal fracture     Complications: no apparent         9/21/2021 2:34 PM Jesús Holley M.D.

## 2021-09-21 NOTE — ANESTHESIA PREPROCEDURE EVALUATION
"51F here for ORIF ankle    No Known Allergies     Relevant Problems   ANESTHESIA   (positive) PONV (postoperative nausea and vomiting)      PULMONARY (within normal limits)      NEURO   (positive) History of pulmonary embolus (PE)      CARDIAC (within normal limits)      Other   (positive) Factor 5 Leiden mutation, heterozygous (HCC)     Past Medical History:   Diagnosis Date   • Anesthesia     nausea   • Blood clotting disorder (HCC) 2019    L lung   • Factor 5 Leiden mutation, heterozygous (HCC)     Factor 5 Leiden   • PONV (postoperative nausea and vomiting)       No current facility-administered medications on file prior to encounter.     Current Outpatient Medications on File Prior to Encounter   Medication Sig Dispense Refill   • Multiple Vitamin (MULTI-VITAMIN PO) Take 1 Tab by mouth every evening.     • VITAMIN D PO Take 1 Cap by mouth every evening.     • Docusate Sodium (COLACE PO) Take 2 Caps by mouth every evening.        Past Surgical History:   Procedure Laterality Date   • PLASTIC SURGERY  11/2019    \"Tummy Tuck\"   • VAGINAL HYSTERECTOMY SCOPE TOTAL  1/26/2010    Performed by STEVEN LIAO at SURGERY SAME DAY [facility]   • CYSTOSCOPY  1/26/2010    Performed by STEVEN LIAO at SURGERY SAME DAY [facility]   • LAPAROSCOPY     • OTHER      lasik   • TONSILLECTOMY        Vitals:    09/21/21 1147   BP: 140/67   Pulse: 79   Resp: 16   Temp: 36.6 °C (97.9 °F)   SpO2: 98%        Physical Exam    Airway   Mallampati: I  TM distance: >3 FB  Neck ROM: full       Cardiovascular - normal exam     Dental - normal exam           Pulmonary - normal exam     Abdominal    Neurological - normal exam                 Anesthesia Plan    ASA 2       Plan - general and peripheral nerve block     Peripheral nerve block will be post-op pain control  Airway plan will be LMA        Plan Factors:   Patient was previously instructed to abstain from smoking on day of procedure.  Patient did not smoke on day of " procedure.      Induction: intravenous      Pertinent diagnostic labs and testing reviewed    Informed Consent:    Anesthetic plan and risks discussed with patient.

## 2021-09-21 NOTE — ANESTHESIA PROCEDURE NOTES
Airway    Date/Time: 9/21/2021 1:15 PM  Performed by: Lauryn Kennedy M.D.  Authorized by: Lauryn Kennedy M.D.     Location:  OR  Urgency:  Elective  Indications for Airway Management:  Anesthesia      Spontaneous Ventilation: absent    Sedation Level:  Deep  Preoxygenated: Yes    Patient Position:  Sniffing  Mask Difficulty Assessment:  0 - not attempted  Final Airway Type:  Supraglottic airway  Final Supraglottic Airway:  Standard LMA    SGA Size:  4  Cuff Pressure (cm H2O):  40  Airway Seal Pressure (cm H2O):  20  Number of Attempts at Approach:  1   Cuff pressure measured via integrated color-based cuff pressure indicator.

## 2021-09-21 NOTE — ANESTHESIA PROCEDURE NOTES
Peripheral Block    Date/Time: 9/21/2021 12:46 PM  Performed by: Lauryn Kennedy M.D.  Authorized by: Lauryn Kennedy M.D.     Patient Location:  Pre-op  Start Time:  9/21/2021 12:46 PM  End Time:  9/21/2021 12:51 PM  Reason for Block: at surgeon's request and post-op pain management ONLY    patient identified, IV checked, site marked, risks and benefits discussed, surgical consent, monitors and equipment checked, pre-op evaluation and timeout performed    Patient Position:  Supine  Prep: ChloraPrep    Monitoring:  Heart rate, continuous pulse ox and cardiac monitor  Block Region:  Lower Extremity  Lower Extremity - Block Type:  Selective FEMORAL nerve block at the Adductor Canal    Laterality:  Left  Procedures: ultrasound guided  Image captured, interpreted and electronically stored.  Block Type:  Single-shot  Needle Length:  100mm  Needle Gauge:  21 G  Needle Localization:  Ultrasound guidance  Injection Assessment:  Negative aspiration for heme, no paresthesia on injection, incremental injection and local visualized surrounding nerve on ultrasound  Evidence of intravascular injection: No     US Guided Selective Femoral Nerve Block at Adductor Canal:   US probe placed at mid-thigh level on externally rotated leg and femur identified.  Probe directed medially until Sartorius Muscle (SM), Femoral Artery (FA) and Saphenous Nerve (SN) identified in Adductor Canal (AC).  Needle inserted anterolateral to probe in an in plane approach into a subsartorial perivascular perineural position.  After negative aspiration LA injected with ease and visualized spreading within the AC.

## 2021-09-21 NOTE — DISCHARGE INSTRUCTIONS
ACTIVITY: Rest and take it easy for the first 24 hours.  A responsible adult is recommended to remain with you during that time.  It is normal to feel sleepy.  We encourage you to not do anything that requires balance, judgment or coordination.    MILD FLU-LIKE SYMPTOMS ARE NORMAL. YOU MAY EXPERIENCE GENERALIZED MUSCLE ACHES, THROAT IRRITATION, HEADACHE AND/OR SOME NAUSEA.    FOR 24 HOURS DO NOT:  Drive, operate machinery or run household appliances.  Drink beer or alcoholic beverages.   Make important decisions or sign legal documents.    SPECIAL INSTRUCTIONS: No weight bearing to operative extremity. Ice and elevate. See Dr. Holley's printed instruction sheet for further detail. Resume Xarelto tonight.    DIET: To avoid nausea, slowly advance diet as tolerated, avoiding spicy or greasy foods for the first day.  Add more substantial food to your diet according to your physician's instructions.   INCREASE FLUIDS AND FIBER TO AVOID CONSTIPATION.    SURGICAL DRESSING/BATHING: Keep dressings/splint clean and dry. Do not remove them. Shower as per surgeon's instruction.    FOLLOW-UP APPOINTMENT:  A follow-up appointment should be arranged with your doctor; call to schedule.    You should CALL YOUR PHYSICIAN if you develop:  Fever greater than 101 degrees F.  Pain not relieved by medication, or persistent nausea or vomiting.  Excessive bleeding (blood soaking through dressing) or unexpected drainage from the wound.  Extreme redness or swelling around the incision site, drainage of pus or foul smelling drainage.  Inability to urinate or empty your bladder within 8 hours.    You should call 911 if you develop problems with breathing or chest pain.    If you are unable to contact your doctor or surgical center, you should go to the nearest emergency room or urgent care center.      Physician's telephone #: Dr. Holley [phone]    If any questions arise, call your doctor.  If your doctor is not available, please feel  free to call the Surgical Center at [phone]. The Contact Center is open Monday through Friday 7AM to 5PM and may speak to a nurse at [phone], or toll free at [phone].     A registered nurse may call you a few days after your surgery to see how you are doing after your procedure.    MEDICATIONS: Resume taking daily medication.  Take prescribed pain medication with food.  If no medication is prescribed, you may take non-aspirin pain medication if needed.  PAIN MEDICATION CAN BE VERY CONSTIPATING.  Take a stool softener or laxative such as senokot, pericolace, or milk of magnesia if needed.    Prescription given pre-operatively.      Last pain medication given: None.    If your physician has prescribed pain medication that includes Acetaminophen (Tylenol), do not take additional Acetaminophen (Tylenol) while taking the prescribed medication.    Peripheral Nerve Block Discharge Instructions from Same Day Surgery and Inpatient :    What to Expect - Lower Extremity  · The block may cause you to experience numbness and weakness in your calf and foot on the same side as your surgery  · Numbness, tingling and / or weakness are all normal. For some people, this may be an unpleasant sensation  · These issues will be resolved when the local anesthetic wears off   · You may experience numbness and tingling in your thigh on the same side as your surgery if the block medicine was injected at your groin area  · Numbness will make it difficult to walk  · You may have problems with balance and walking so be very careful   · Follow your surgeon's direction regarding weight bearing on your surgical limb  · Be very careful with your numb limb  Precautions  · The numbness may affect your balance  · Be careful when walking or moving around  · Your leg may be weak: be very careful putting weight on it  · If your surgeon did not specify a time, you should not bear weight for 24 hours  · Be sure to ask for help when you  "need it  · It is better to have help than to fall and hurt yourself  Prevent Injury  · Protect the limb like a baby  · Beware of exposing your limb to extreme heat or cold or trauma  · The limb may be injured without you noticing because it is numb  · Keep the limb elevated whenever possible  · Do not sleep on the limb  · Change the position of the limb regularly  · Avoid putting pressure on your surgical limb  Pain Control  · The initial block on the day of surgery will make your extremity feel \"numb\"  · Any consecutive injection including prior to discharge from the hospital will make your extremity feel \"numb\"  · You may feel an aching or burning when the local anesthesia starts to wear off  · Take pain pills as prescribed by your surgeon  · Call your surgeon or anesthesiologist if you do not have adequate pain control          Depression / Suicide Risk    As you are discharged from this [facility], it is important to learn how to keep safe from harming yourself.    Recognize the warning signs:  · Abrupt changes in personality, positive or negative- including increase in energy   · Giving away possessions  · Change in eating patterns- significant weight changes-  positive or negative  · Change in sleeping patterns- unable to sleep or sleeping all the time   · Unwillingness or inability to communicate  · Depression  · Unusual sadness, discouragement and loneliness  · Talk of wanting to die  · Neglect of personal appearance   · Rebelliousness- reckless behavior  · Withdrawal from people/activities they love  · Confusion- inability to concentrate     If you or a loved one observes any of these behaviors or has concerns about self-harm, here's what you can do:  · Talk about it- your feelings and reasons for harming yourself  · Remove any means that you might use to hurt yourself (examples: pills, rope, extension cords, firearm)  · Get professional help from the community (Mental Health, Substance Abuse, " psychological counseling)  · Do not be alone:Call your Safe Contact- someone whom you trust who will be there for you.  · Call your local CRISIS HOTLINE 035-0467 or 228-091-0220  · Call your local Children's Mobile Crisis Response Team [place] (439) 757-1968 or www.digitalbox  · Call the toll free National Suicide Prevention Hotlines   · National Suicide Prevention Lifeline 842-417-JDFS (3806)  National Hope Line Network 800-SUICIDE (797-8723)

## 2021-09-21 NOTE — OR NURSING
1421: To PACU post left medial malleolus w/ blocks. Pt is extubated, breathing is spontaneous and unlabored. Unable to sense touch to toes and is unable to move them. No pain. Toes are pink and warm w/ brisk cap refill.  1450: Pt remains pain/nausea free.   1517: Meets criteria for stage ll.

## 2021-09-21 NOTE — ANESTHESIA POSTPROCEDURE EVALUATION
Patient: Marcella Barajas    Procedure Summary     Date: 09/21/21 Room / Location:  OR  / SURGERY [facility]    Anesthesia Start: 1308 Anesthesia Stop: 1422    Procedure: ORIF, ANKLE - MEDIAL MEALLEOLOUS (Left Ankle) Diagnosis: (BIMALLEOLAR FRACTURE OF ANKLE - LEFT)    Surgeons: Jesús Holley M.D. Responsible Provider: Lauryn Kennedy M.D.    Anesthesia Type: general, peripheral nerve block ASA Status: 2          Final Anesthesia Type: general, peripheral nerve block  Last vitals  BP   Blood Pressure: (!) 91/45    Temp   36.5 °C (97.7 °F)    Pulse   64   Resp   16    SpO2   97 %      Anesthesia Post Evaluation    Patient location during evaluation: PACU  Patient participation: complete - patient participated  Level of consciousness: awake and alert  Pain score: 0    Airway patency: patent  Anesthetic complications: no  Cardiovascular status: hemodynamically stable  Respiratory status: acceptable  Hydration status: euvolemic    PONV: none    patient able to participate, but full recovery from regional anesthesia has not occurred and is not expected within the stipulated timeframe for the completion of the evaluation      There were no known complications for this encounter.

## 2021-09-21 NOTE — ANESTHESIA PROCEDURE NOTES
Peripheral Block    Date/Time: 9/21/2021 12:40 PM  Performed by: Lauryn Kennedy M.D.  Authorized by: Lauryn Kennedy M.D.     Patient Location:  Pre-op  Start Time:  9/21/2021 12:40 PM  End Time:  9/21/2021 12:45 PM  Reason for Block: at surgeon's request and post-op pain management ONLY    patient identified, IV checked, site marked, risks and benefits discussed, surgical consent, monitors and equipment checked, pre-op evaluation and timeout performed    Patient Position:  Right lateral decubitus  Prep: ChloraPrep    Monitoring:  Heart rate, continuous pulse ox and cardiac monitor  Block Region:  Lower Extremity  Lower Extremity - Block Type:  SCIATIC nerve block, lateral approach    Laterality:  Left  Procedures: ultrasound guided  Image captured, interpreted and electronically stored.  Block Type:  Single-shot  Needle Length:  100mm  Needle Gauge:  21 G  Needle Localization:  Ultrasound guidance  Injection Assessment:  Negative aspiration for heme, no paresthesia on injection, incremental injection and local visualized surrounding nerve on ultrasound  Evidence of intravascular injection: No     US Guided Sciatic Nerve Block   US probe placed several cm proximal to popliteal crease on posterior thigh and scanned caudad and cephalad until Sciatic Nerve (SN) identified superficial/lateral to popliteal artery.  Needle inserted lateral to probe in an in plane approach under direct visualization to a perineural position.  After negative aspiration LA injected with ease and visualized surrounding the SN.

## 2021-09-21 NOTE — OP REPORT
DATE OF SERVICE:  09/21/2021     PREOPERATIVE DIAGNOSIS:  Left bimalleolar ankle fracture with displaced medial   malleolus and nondisplaced lateral malleolus.     POSTOPERATIVE DIAGNOSIS:  Left bimalleolar ankle fracture with displaced   medial malleolus and nondisplaced lateral malleolus.     PROCEDURE:  Open reduction internal fixation, displaced left medial malleolus   fracture and nonoperative treatment of nondisplaced stable left malleolar   fracture.     SURGEON:  Jesús Holley MD     ASSISTANT:  Dania Trevizo, certified first assist.     ANESTHESIA:  General block and local.     ANESTHESIOLOGIST:  Lauryn Kennedy MD     ESTIMATED BLOOD LOSS:  Minimal.     TOURNIQUET TIME:  31 minutes.     DISPOSITION:  PACU.     CONDITION:  Stable.     INDICATIONS:  The patient is an active 51-year-old female who was playing   volleyball, suffered a left displaced medial malleolus fracture.  She had a   lateral distal fibular fracture, which was nondisplaced and only through the   lateral cortex did not appear to breach the medial cortex.  We discussed   treatment options, nonoperative and operative treatment, possible fixation of   medial malleolus and evaluation at the time of surgery, the lateral malleolus   was stable nonoperative treatment.  Risks including but not limited to   infection, neurovascular injury, incomplete relief of symptoms, need for   further surgery, symptomatic hardware, malunion, nonunion, inability to return   to pre-injury state, DVT, PE, cardiac arrest, complications of anesthesia.    Informed consent was signed and placed in chart.  All questions were answered.    No guarantees implied or given.     TECHNIQUE:  Both patient and I agreed the correct operative extremity.  Left   leg was signed and marked in preoperative holding.  I consulted Dr. Lauryn Knenedy, anesthesia regarding perioperative pain management.  She consented the   patient for an adductor canal and popliteal block, which  was carried out in   the preoperative holding under sterile condition.  The patient was taken to   the operative suite.  She was given 2 grams of Ancef prophylaxis.  After   adequate anesthesia, time-out was taken by everybody present to identify the   correct patient, limb, and procedure.  The left leg was sterilely prepped and   draped in standard fashion.  A calf tourniquet was placed.  Limb was   exsanguinated and tourniquet inflated to 200 mmHg pressure.  Medial incision   was made in line with the medial malleolus.  Skin was incised, blunt   dissection was carried out with care not to involve the saphenous vein or   nerve.  Fracture line was identified.  Incarcerated periosteum and fibrous   tissue was removed.  There was a small area of comminution on the cortex,   which was removed with the debridement.  After irrigating evaluation of the   medial talus cartilage showed it to be intact.  Small  hole was drilled   in the proximal metaphyseal portion of the tibia, 2 point reduction clamp was   placed.  Anatomic reduction was achieved and verified visually and on AP and   lateral x-rays.  Two K-wires were placed.  These were the near cortex was   overdrilled with a cannulated drill, followed by placement of a partially   threaded 4-0 cannulated screws.  Their heads were sunken the medial malleolus.    Care was taken to keep him out of the joint space.  Final x-ray showed   anatomic restoration of the joint line without any hardware involvement in the   joint.  Stressing and evaluation of the lateral malleolus fracture showed it   to be stable.  Decision was made not to fix it.  Tourniquet was let down.    Wound was copiously irrigated.  Hemostasis achieved.  Wound was closed with   buried 2-0 Vicryl and nylon for the skin.  Xeroform soft dressing and   posterior splint side protection was placed.  The patient was transferred to   recovery in stable condition.  Counts were correct.  No apparent    complications.  In recovery, her toes were pink, warm with brisk capillary   refill and she had a good 2+ dorsalis pedis pulse.     Dania Trevizo, certified first assist, was essential for successful   completion of the case.  It could not have been done without her.        ______________________________  MD LOTUS Mason/LUCIO    DD:  09/21/2021 14:39  DT:  09/21/2021 15:56    Job#:  [id number]

## 2021-10-20 ENCOUNTER — OFFICE VISIT (OUTPATIENT)
Dept: MEDICAL GROUP | Facility: MEDICAL CENTER | Age: 51
End: 2021-10-20
Payer: COMMERCIAL

## 2021-10-20 VITALS
DIASTOLIC BLOOD PRESSURE: 78 MMHG | OXYGEN SATURATION: 100 % | SYSTOLIC BLOOD PRESSURE: 122 MMHG | BODY MASS INDEX: 23.95 KG/M2 | HEIGHT: 68 IN | TEMPERATURE: 98.9 F | HEART RATE: 89 BPM | WEIGHT: 158 LBS

## 2021-10-20 DIAGNOSIS — Z12.31 ENCOUNTER FOR SCREENING MAMMOGRAM FOR BREAST CANCER: ICD-10-CM

## 2021-10-20 DIAGNOSIS — K59.09 CHRONIC CONSTIPATION: ICD-10-CM

## 2021-10-20 DIAGNOSIS — S82.842D CLOSED BIMALLEOLAR FRACTURE OF LEFT ANKLE WITH ROUTINE HEALING: ICD-10-CM

## 2021-10-20 DIAGNOSIS — R74.8 ELEVATED ALKALINE PHOSPHATASE LEVEL: ICD-10-CM

## 2021-10-20 DIAGNOSIS — Z23 NEED FOR VACCINATION: ICD-10-CM

## 2021-10-20 DIAGNOSIS — E55.9 VITAMIN D INSUFFICIENCY: ICD-10-CM

## 2021-10-20 DIAGNOSIS — Z78.9 UNKNOWN STATUS OF IMMUNITY TO COVID-19 VIRUS: ICD-10-CM

## 2021-10-20 DIAGNOSIS — D68.51 FACTOR 5 LEIDEN MUTATION, HETEROZYGOUS (HCC): ICD-10-CM

## 2021-10-20 PROBLEM — R11.2 PONV (POSTOPERATIVE NAUSEA AND VOMITING): Status: RESOLVED | Noted: 2021-09-21 | Resolved: 2021-10-20

## 2021-10-20 PROBLEM — Z98.890 PONV (POSTOPERATIVE NAUSEA AND VOMITING): Status: RESOLVED | Noted: 2021-09-21 | Resolved: 2021-10-20

## 2021-10-20 PROCEDURE — 99214 OFFICE O/P EST MOD 30 MIN: CPT | Mod: 25 | Performed by: INTERNAL MEDICINE

## 2021-10-20 PROCEDURE — 90471 IMMUNIZATION ADMIN: CPT | Performed by: INTERNAL MEDICINE

## 2021-10-20 PROCEDURE — 90686 IIV4 VACC NO PRSV 0.5 ML IM: CPT | Performed by: INTERNAL MEDICINE

## 2021-10-20 ASSESSMENT — FIBROSIS 4 INDEX: FIB4 SCORE: 2.041241452319315083

## 2021-10-20 NOTE — PROGRESS NOTES
"Subjective:     CC: establish care    Diagnoses of Chronic constipation, Encounter for screening mammogram for breast cancer, Elevated alkaline phosphatase level, Unknown status of immunity to COVID-19 virus, Need for vaccination, Closed bimalleolar fracture of left ankle with routine healing, Factor 5 Leiden mutation, heterozygous (HCC), and Vitamin D insufficiency were pertinent to this visit.    HISTORY OF THE PRESENT ILLNESS: Patient is a 51 y.o. female. This pleasant patient is here today to establish care. Her prior PCP was Dr. Winter oBbby.    Problem   Closed Bimalleolar Fracture of Left Ankle With Routine Healing    S/p trauma awhile playing volleyball 09/12/201  S/p ORIF 09/21/2021  Patient denies pain, tingling, numbness, non-weigh bearing.   PT starts today (10/20/2021).      Chronic Constipation    Patient has tried dietary modification, drinking plenty of water.  Screening colonoscopy was normal.  Currently she is taking Colace 1 capsule daily.       Vitamin D Insufficiency    Patient currently taking vitamin D supplement 1000 units daily, tolerating well.     Factor 5 Leiden mutation, heterozygous (HCC)    Personal history of provoked PE status post surgery.  Currently on Xarelto 10 mg daily for post surgery VTE prophylaxis.       Ponv (Postoperative Nausea and Vomiting) (Resolved)     Past Medical History:   Diagnosis Date   • Anesthesia     nausea   • Blood clotting disorder (HCC) 2019    L lung   • Factor 5 Leiden mutation, heterozygous (HCC)     Factor 5 Leiden   • PONV (postoperative nausea and vomiting)      Past Surgical History:   Procedure Laterality Date   • ANKLE ORIF Left 9/21/2021    Procedure: ORIF, ANKLE - MEDIAL MEALLEOLOUS;  Surgeon: Jesús Holley M.D.;  Location: SURGERY [facility];  Service: Orthopedics   • PLASTIC SURGERY  11/2019    \"Sharif Gaspar\"   • VAGINAL HYSTERECTOMY SCOPE TOTAL  1/26/2010    Performed by STEVEN LIAO at SURGERY SAME DAY [facility]   • " "CYSTOSCOPY  1/26/2010    Performed by STEVEN LIAO at SURGERY SAME DAY [facility]   • LAPAROSCOPY     • OTHER      lasik   • TONSILLECTOMY       Family History   Problem Relation Age of Onset   • Hypertension Mother    • Hypertension Father    • Pulmonary Embolism Brother    • No Known Problems Brother    • No Known Problems Son    • No Known Problems Son    • No Known Problems Son      Social History     Tobacco Use   • Smoking status: Never Smoker   • Smokeless tobacco: Never Used   Vaping Use   • Vaping Use: Never used   Substance Use Topics   • Alcohol use: Yes     Alcohol/week: 4.8 oz     Types: 8 Glasses of wine per week   • Drug use: No     Current Outpatient Medications Ordered in Epic   Medication Sig Dispense Refill   • XARELTO 10 MG Tab tablet Take 10 mg by mouth.     • Multiple Vitamin (MULTI-VITAMIN PO) Take 1 Tab by mouth every evening.     • VITAMIN D PO Take 1 Cap by mouth every evening.     • Docusate Sodium (COLACE PO) Take 2 Caps by mouth every evening.       No current Epic-ordered facility-administered medications on file.       Health Maintenance: Patient is due for flu vaccine and is interested in getting one today.  We have also discussed patient being vaccinated against COVID-19.  Mammogram ordered and to be scheduled.     ROS:   Gen: no fevers  Pulm: no sob  GI: no nausea/vomiting      Objective:   Exam: /78 (BP Location: Left arm, Patient Position: Sitting, BP Cuff Size: Adult)   Pulse 89   Temp 37.2 °C (98.9 °F) (Temporal)   Ht 1.727 m (5' 8\")   Wt 71.7 kg (158 lb)   SpO2 100%  Body mass index is 24.02 kg/m².    General: Normal appearing. No distress.  HEENT: Normocephalic. Eyes conjunctiva clear lids without ptosis, pupils equal and reactive to light,  oropharynx is without erythema, edema or exudates.   Pulmonary: Clear to ausculation.  Normal effort. No wheezing.  Cardiovascular: Regular rate and rhythm without murmur.  Lymph: No cervical or supraclavicular lymph " nodes are palpable  Skin: Warm and dry.  No obvious lesions.  Musculoskeletal:  Visual Inspection: Feet without maceration, ulcers, or fissures.  Trace nonpitting edema up to mid calf.  Pedal pulses: intact bilaterally. Postsurgical wound  ~5cm on the medial malleolus, healing well, dry and without exudate.  Psych: Normal mood and affect. Alert and oriented x3. Judgment and insight is normal.    Labs: I have reviewed CBC, CMP, hemoglobin A1c, Vitamin D level and lipid panel.    Assessment & Plan:   51 y.o. female with the following -    Problem List Items Addressed This Visit     Vitamin D insufficiency     Continue vitamin D supplement.         Factor 5 Leiden mutation, heterozygous (HCC)     To continue Xarelto 10 mg for 10 more days until visit with orthopedic surgeon and start ambulating.         Chronic constipation     To continue Colace once daily.         Closed bimalleolar fracture of left ankle with routine healing     Patient to start PT sessions, remain nonweightbearing until Ortho appointment in November.  Due to patient personal history of provoked PE she is currently VTE prophylaxis with Xarelto - plan for 10 more days (10/30/2021).            Other Visit Diagnoses     Encounter for screening mammogram for breast cancer        Relevant Orders    MA-SCREENING MAMMO BILAT W/TOMOSYNTHESIS W/O CAD    Elevated alkaline phosphatase level        Relevant Orders    Comp Metabolic Panel    Unknown status of immunity to COVID-19 virus        Relevant Orders    SARS CoV-2 Ab, Total    Need for vaccination        Relevant Orders    INFLUENZA VACCINE QUAD INJ (PF) (Completed)          I have reviewed external notes including urgent care visit notes, left ankle x-ray, surgery notes.    Return in about 6 months (around 4/20/2022) for annual wellness visit.    Please note that this dictation was created using voice recognition software. I have made every reasonable attempt to correct obvious errors, but I expect that  there are errors of grammar and possibly content that I did not discover before finalizing the note.

## 2021-10-20 NOTE — ASSESSMENT & PLAN NOTE
Patient to start PT sessions, remain nonweightbearing until Ortho appointment in November.  Due to patient personal history of provoked PE she is currently VTE prophylaxis with Xarelto - plan for 10 more days (10/30/2021).

## 2021-10-20 NOTE — ASSESSMENT & PLAN NOTE
To continue Xarelto 10 mg for 10 more days until visit with orthopedic surgeon and start ambulating.

## 2021-10-22 ENCOUNTER — HOSPITAL ENCOUNTER (OUTPATIENT)
Dept: LAB | Facility: MEDICAL CENTER | Age: 51
End: 2021-10-22
Attending: INTERNAL MEDICINE
Payer: COMMERCIAL

## 2021-10-22 ENCOUNTER — TELEPHONE (OUTPATIENT)
Dept: MEDICAL GROUP | Facility: MEDICAL CENTER | Age: 51
End: 2021-10-22

## 2021-10-22 DIAGNOSIS — E83.52 HYPERCALCEMIA: ICD-10-CM

## 2021-10-22 DIAGNOSIS — Z78.9 UNKNOWN STATUS OF IMMUNITY TO COVID-19 VIRUS: ICD-10-CM

## 2021-10-22 DIAGNOSIS — R74.8 ELEVATED ALKALINE PHOSPHATASE LEVEL: ICD-10-CM

## 2021-10-22 LAB
ALBUMIN SERPL BCP-MCNC: 4.5 G/DL (ref 3.2–4.9)
ALBUMIN/GLOB SERPL: 2.8 G/DL
ALP SERPL-CCNC: 103 U/L (ref 30–99)
ALT SERPL-CCNC: 18 U/L (ref 2–50)
ANION GAP SERPL CALC-SCNC: 5 MMOL/L (ref 7–16)
AST SERPL-CCNC: 20 U/L (ref 12–45)
BILIRUB SERPL-MCNC: 0.4 MG/DL (ref 0.1–1.5)
BUN SERPL-MCNC: 13 MG/DL (ref 8–22)
CALCIUM SERPL-MCNC: 12.3 MG/DL (ref 8.5–10.5)
CHLORIDE SERPL-SCNC: 106 MMOL/L (ref 96–112)
CO2 SERPL-SCNC: 29 MMOL/L (ref 20–33)
CREAT SERPL-MCNC: 0.57 MG/DL (ref 0.5–1.4)
GLOBULIN SER CALC-MCNC: 1.6 G/DL (ref 1.9–3.5)
GLUCOSE SERPL-MCNC: 95 MG/DL (ref 65–99)
POTASSIUM SERPL-SCNC: 4.3 MMOL/L (ref 3.6–5.5)
PROT SERPL-MCNC: 6.1 G/DL (ref 6–8.2)
SARS-COV-2 AB SERPL QL IA: REACTIVE
SODIUM SERPL-SCNC: 140 MMOL/L (ref 135–145)

## 2021-10-22 PROCEDURE — 86769 SARS-COV-2 COVID-19 ANTIBODY: CPT

## 2021-10-22 PROCEDURE — 80053 COMPREHEN METABOLIC PANEL: CPT

## 2021-10-22 PROCEDURE — 36415 COLL VENOUS BLD VENIPUNCTURE: CPT

## 2021-10-22 NOTE — TELEPHONE ENCOUNTER
Spoke to patient about CA level per Provider request. Patient understands the instructions Dr JUSTIN sent her. Did have a question of how long she needs to wait after stopping her Vitamin D to get her labs done. Sent provider a message, waiting for response.   Told patient this RN will mychart message her once receive answer from provider.      ----- Message from Myla Xiao M.D. sent at 10/22/2021 12:47 PM PDT -----  Vignesh Villatoro,     Just got this critical lab value of hypercalcemia, please call patient to make sure she follows instructions that I have sent. She was asymptomatic during the visit, I think it is chronic and has been slowly progressing. And she will need a follow up appointment as well.    Thank you in advance!

## 2021-10-26 ENCOUNTER — HOSPITAL ENCOUNTER (OUTPATIENT)
Dept: LAB | Facility: MEDICAL CENTER | Age: 51
End: 2021-10-26
Attending: INTERNAL MEDICINE
Payer: COMMERCIAL

## 2021-10-26 DIAGNOSIS — E83.52 HYPERCALCEMIA: ICD-10-CM

## 2021-10-26 LAB
25(OH)D3 SERPL-MCNC: 57 NG/ML (ref 30–100)
ALBUMIN SERPL BCP-MCNC: 4.7 G/DL (ref 3.2–4.9)
ALBUMIN/GLOB SERPL: 2 G/DL
ALP SERPL-CCNC: 113 U/L (ref 30–99)
ALT SERPL-CCNC: 16 U/L (ref 2–50)
ANION GAP SERPL CALC-SCNC: 7 MMOL/L (ref 7–16)
AST SERPL-CCNC: 22 U/L (ref 12–45)
BILIRUB SERPL-MCNC: 0.5 MG/DL (ref 0.1–1.5)
BUN SERPL-MCNC: 15 MG/DL (ref 8–22)
CALCIUM SERPL-MCNC: 11.9 MG/DL (ref 8.5–10.5)
CHLORIDE SERPL-SCNC: 106 MMOL/L (ref 96–112)
CO2 SERPL-SCNC: 27 MMOL/L (ref 20–33)
CREAT SERPL-MCNC: 0.82 MG/DL (ref 0.5–1.4)
GLOBULIN SER CALC-MCNC: 2.3 G/DL (ref 1.9–3.5)
GLUCOSE SERPL-MCNC: 100 MG/DL (ref 65–99)
POTASSIUM SERPL-SCNC: 4.4 MMOL/L (ref 3.6–5.5)
PROT SERPL-MCNC: 7 G/DL (ref 6–8.2)
PTH-INTACT SERPL-MCNC: 125 PG/ML (ref 14–72)
SODIUM SERPL-SCNC: 140 MMOL/L (ref 135–145)

## 2021-10-26 PROCEDURE — 36415 COLL VENOUS BLD VENIPUNCTURE: CPT

## 2021-10-26 PROCEDURE — 82652 VIT D 1 25-DIHYDROXY: CPT

## 2021-10-26 PROCEDURE — 82306 VITAMIN D 25 HYDROXY: CPT

## 2021-10-26 PROCEDURE — 83970 ASSAY OF PARATHORMONE: CPT

## 2021-10-26 PROCEDURE — 80053 COMPREHEN METABOLIC PANEL: CPT

## 2021-10-28 LAB — 1,25(OH)2D3 SERPL-MCNC: 55 PG/ML (ref 19.9–79.3)

## 2021-11-01 ENCOUNTER — OFFICE VISIT (OUTPATIENT)
Dept: MEDICAL GROUP | Facility: MEDICAL CENTER | Age: 51
End: 2021-11-01
Payer: COMMERCIAL

## 2021-11-01 VITALS
BODY MASS INDEX: 23.79 KG/M2 | HEART RATE: 91 BPM | DIASTOLIC BLOOD PRESSURE: 70 MMHG | RESPIRATION RATE: 14 BRPM | TEMPERATURE: 98.9 F | SYSTOLIC BLOOD PRESSURE: 102 MMHG | HEIGHT: 68 IN | WEIGHT: 157 LBS | OXYGEN SATURATION: 98 %

## 2021-11-01 DIAGNOSIS — S82.842D CLOSED BIMALLEOLAR FRACTURE OF LEFT ANKLE WITH ROUTINE HEALING: ICD-10-CM

## 2021-11-01 DIAGNOSIS — K59.09 CHRONIC CONSTIPATION: ICD-10-CM

## 2021-11-01 DIAGNOSIS — E21.0 PRIMARY HYPERPARATHYROIDISM (HCC): ICD-10-CM

## 2021-11-01 DIAGNOSIS — E55.9 VITAMIN D INSUFFICIENCY: ICD-10-CM

## 2021-11-01 PROCEDURE — 99214 OFFICE O/P EST MOD 30 MIN: CPT | Performed by: INTERNAL MEDICINE

## 2021-11-01 ASSESSMENT — FIBROSIS 4 INDEX: FIB4 SCORE: 1.833333333333333333

## 2021-11-01 NOTE — PROGRESS NOTES
"Subjective:     Chief Complaint   Patient presents with   • Follow-Up   • Lab Results     HPI: Marcella is a pleasant 51 y.o. female who presents today to follow up on hypercalcemia.  And mammogram awaiting completion.    Problem   Primary Hyperparathyroidism (Hcc)    This is a new problem.  Diagnosed during routine blood work with elevated calcium > 12.  PTH is elevated, vitamin D within normal limits.  Patient is currently asymptomatic except for constipation.       Closed Bimalleolar Fracture of Left Ankle With Routine Healing    S/p ORIF 09/21/2021  Had appointment with Ortho today, allowed to start bearing weight.     Chronic Constipation    Chronic, stable, Colace 1 capsule daily.     Vitamin D Insufficiency    Patient currently taking vitamin D supplement 1000 units daily, tolerating well.       Past Medical History:   Diagnosis Date   • Anesthesia     nausea   • Blood clotting disorder (HCC) 2019    L lung   • Factor 5 Leiden mutation, heterozygous (HCC)     Factor 5 Leiden   • PONV (postoperative nausea and vomiting)      Current Outpatient Medications Ordered in Epic   Medication Sig Dispense Refill   • Multiple Vitamin (MULTI-VITAMIN PO) Take 1 Tab by mouth every evening.     • VITAMIN D PO Take 1 Cap by mouth every evening.     • Docusate Sodium (COLACE PO) Take 2 Caps by mouth every evening.       No current Epic-ordered facility-administered medications on file.     Health Maintenance: Due for mammogram, pending completion.    ROS:  Gen: no fevers/chills  Pulm: no sob  CV: no chest pain  GI: no nausea    Objective:     Exam:  /70 (BP Location: Left arm, Patient Position: Sitting, BP Cuff Size: Adult)   Pulse 91   Temp 37.2 °C (98.9 °F) (Temporal)   Resp 14   Ht 1.715 m (5' 7.5\")   Wt 71.2 kg (157 lb)   LMP 01/08/2010   SpO2 98%   BMI 24.23 kg/m²  Body mass index is 24.23 kg/m².    Gen: Alert and oriented, No apparent distress.    Labs: I have reviewed, interpreted and discussed with " patient results of her CMP, vitamin D level and parathyroid hormone level.    Assessment & Plan:   Marcella  is a pleasant 51 y.o. female with the following -     Problem List Items Addressed This Visit     Vitamin D insufficiency (Chronic)     Continue taking vitamin D.         Chronic constipation (Chronic)     Continue dietary modification, water intake and Colace 1 daily.         Closed bimalleolar fracture of left ankle with routine healing     Per Ortho okay to be weightbearing today.         Primary hyperparathyroidism (HCC)     Had extensive counseling with patient regarding possible signs and symptoms of worsening hypercalcemia.  Instructed patient to continue taking vitamin D, do not limit calcium intake.  Check urine 24-hour calcium level, bone density.  Referred to endocrinology.         Relevant Orders    Urine Calcium 24 HR or Random    DS-BONE DENSITY STUDY (DEXA)    Referral to Endocrinology        Return in about 6 months (around 5/1/2022).    Please note that this dictation was created using voice recognition software. I have made every reasonable attempt to correct obvious errors, but I expect that there are errors of grammar and possibly content that I did not discover before finalizing the note.

## 2021-11-01 NOTE — ASSESSMENT & PLAN NOTE
Had extensive counseling with patient regarding possible signs and symptoms of worsening hypercalcemia.  Instructed patient to continue taking vitamin D, do not limit calcium intake.  Check urine 24-hour calcium level, bone density.  Referred to endocrinology.

## 2021-11-24 ENCOUNTER — HOSPITAL ENCOUNTER (OUTPATIENT)
Dept: RADIOLOGY | Facility: MEDICAL CENTER | Age: 51
End: 2021-11-24
Attending: INTERNAL MEDICINE
Payer: COMMERCIAL

## 2021-11-24 DIAGNOSIS — E21.0 PRIMARY HYPERPARATHYROIDISM (HCC): ICD-10-CM

## 2021-11-24 PROCEDURE — 77080 DXA BONE DENSITY AXIAL: CPT

## 2021-11-30 ENCOUNTER — APPOINTMENT (RX ONLY)
Dept: URBAN - METROPOLITAN AREA CLINIC 35 | Facility: CLINIC | Age: 51
Setting detail: DERMATOLOGY
End: 2021-11-30

## 2021-11-30 DIAGNOSIS — Z71.89 OTHER SPECIFIED COUNSELING: ICD-10-CM

## 2021-11-30 DIAGNOSIS — D22 MELANOCYTIC NEVI: ICD-10-CM

## 2021-11-30 DIAGNOSIS — L81.4 OTHER MELANIN HYPERPIGMENTATION: ICD-10-CM

## 2021-11-30 PROBLEM — D22.4 MELANOCYTIC NEVI OF SCALP AND NECK: Status: ACTIVE | Noted: 2021-11-30

## 2021-11-30 PROBLEM — D22.5 MELANOCYTIC NEVI OF TRUNK: Status: ACTIVE | Noted: 2021-11-30

## 2021-11-30 PROBLEM — D23.72 OTHER BENIGN NEOPLASM OF SKIN OF LEFT LOWER LIMB, INCLUDING HIP: Status: ACTIVE | Noted: 2021-11-30

## 2021-11-30 PROBLEM — D22.122 MELANOCYTIC NEVI OF LEFT LOWER EYELID, INCLUDING CANTHUS: Status: ACTIVE | Noted: 2021-11-30

## 2021-11-30 PROCEDURE — ? SUNSCREEN RECOMMENDATIONS

## 2021-11-30 PROCEDURE — ? COUNSELING

## 2021-11-30 PROCEDURE — 99213 OFFICE O/P EST LOW 20 MIN: CPT

## 2021-11-30 PROCEDURE — ? OBSERVATION AND MEASURE

## 2021-11-30 ASSESSMENT — LOCATION DETAILED DESCRIPTION DERM
LOCATION DETAILED: RIGHT MEDIAL SUPERIOR CHEST
LOCATION DETAILED: RIGHT CENTRAL ANTERIOR NECK
LOCATION DETAILED: SUPERIOR THORACIC SPINE
LOCATION DETAILED: LEFT LATERAL INFERIOR EYELID
LOCATION DETAILED: RIGHT LATERAL ABDOMEN

## 2021-11-30 ASSESSMENT — LOCATION SIMPLE DESCRIPTION DERM
LOCATION SIMPLE: CHEST
LOCATION SIMPLE: UPPER BACK
LOCATION SIMPLE: ABDOMEN
LOCATION SIMPLE: NECK
LOCATION SIMPLE: LEFT INFERIOR EYELID

## 2021-11-30 ASSESSMENT — LOCATION ZONE DERM
LOCATION ZONE: NECK
LOCATION ZONE: EYELID
LOCATION ZONE: TRUNK

## 2021-11-30 NOTE — PROCEDURE: OBSERVATION
Detail Level: Detailed
Size Of Lesion: 3 mm
Morphology Per Location (Optional): flesh toned papule
Size Of Lesion: 1 mm
Morphology Per Location (Optional): dark brown macule

## 2021-12-30 ENCOUNTER — HOSPITAL ENCOUNTER (OUTPATIENT)
Dept: RADIOLOGY | Facility: MEDICAL CENTER | Age: 51
End: 2021-12-30
Attending: INTERNAL MEDICINE
Payer: COMMERCIAL

## 2021-12-30 DIAGNOSIS — Z12.31 ENCOUNTER FOR SCREENING MAMMOGRAM FOR BREAST CANCER: ICD-10-CM

## 2021-12-30 PROCEDURE — 77063 BREAST TOMOSYNTHESIS BI: CPT

## 2022-03-25 ENCOUNTER — OFFICE VISIT (OUTPATIENT)
Dept: ENDOCRINOLOGY | Facility: MEDICAL CENTER | Age: 52
End: 2022-03-25
Attending: NURSE PRACTITIONER
Payer: COMMERCIAL

## 2022-03-25 VITALS
BODY MASS INDEX: 25.01 KG/M2 | HEART RATE: 93 BPM | HEIGHT: 68 IN | DIASTOLIC BLOOD PRESSURE: 76 MMHG | OXYGEN SATURATION: 99 % | SYSTOLIC BLOOD PRESSURE: 126 MMHG | WEIGHT: 165 LBS

## 2022-03-25 DIAGNOSIS — M85.80 OSTEOPENIA, UNSPECIFIED LOCATION: ICD-10-CM

## 2022-03-25 DIAGNOSIS — E55.9 VITAMIN D DEFICIENCY: ICD-10-CM

## 2022-03-25 DIAGNOSIS — E21.0 PRIMARY HYPERPARATHYROIDISM (HCC): ICD-10-CM

## 2022-03-25 PROCEDURE — 99214 OFFICE O/P EST MOD 30 MIN: CPT | Performed by: NURSE PRACTITIONER

## 2022-03-25 PROCEDURE — 99211 OFF/OP EST MAY X REQ PHY/QHP: CPT | Performed by: NURSE PRACTITIONER

## 2022-03-25 NOTE — PROGRESS NOTES
Chief Complaint: Consult requested by Myla Xiao M.D. for evaluation of Hypercalcemia    HPI:     Marcella Barajas is a 52 y.o. female with here for initial evaluation of hypercalcemia.  She was first diagnosed with hypercalcemia in 11/26/2019.  She reports closed Left bimalleolar fracture s/p ORIF 09/21/2022. Fracture occurred while she was playing volleyball.  She denies polydipsia, myalgias, weakness, headaches and fatigue.  She isn't currently on hydrochlorothiazide.  She isn't on lithium therapy.  She is taking calcium supplements.  She denies history of malignancy.  She denies family history of hypercalcemia.  She denies family history of endocrine malignancies.    Patient is quite active, exercising at least 5 days per week.     Calcium level tested before and after holding oral Calcium Supplement:      Ref. Range 10/22/2021 07:49 10/26/2021 07:44   Calcium Latest Ref Range: 8.5 - 10.5 mg/dL 12.3 (HH) 11.9 (H)       Denies history of kidney disease or kidney failure.     Ref. Range 10/26/2021 07:44   Bun Latest Ref Range: 8 - 22 mg/dL 15   Creatinine Latest Ref Range: 0.50 - 1.40 mg/dL 0.82   GFR If  Latest Ref Range: >60 mL/min/1.73 m 2 >60   GFR If Non  Latest Ref Range: >60 mL/min/1.73 m 2 >60     DEXA scan completed 11/24/2021: Evidence of osteopenia.   FINDINGS: The lumbar spine has a mean bone mineral density of 0.993 g/cm2, with a T score of -1.6 and a Z score of -1.2.  The proximal right femur has a mean bone mineral density of 0.780 g/cm2, with a T score of -1.8 and a Z score of -1.4.     Currently in menopause. Hysterectomy in the past; ovaries have been preserved.      Patient currently taking Calcium with Vitamin D and MVI.        Ref. Range 10/26/2021 07:44   25-Hydroxy   Vitamin D 25 Latest Ref Range: 30 - 100 ng/mL 57       Patient's medications, allergies, and social histories were reviewed and updated as appropriate.      ROS:   "    CONS:     No fever, no chills, no weight loss, no fatigue   EYES:      No diplopia, no blurry vision, no redness of eyes, no swelling of eyelids   ENT:    No hearing loss, No ear pain, No sore throat, no dysphagia, no neck swelling   CV:     No chest pain, no palpitations, no claudication, no orthopnea, no PND   PULM:    No SOB, no cough, no hemoptysis, no wheezing    GI:   No nausea, no vomiting, no diarrhea, no constipation, no bloody stools   :  Passing urine well, no dysuria, no hematuria   ENDO:   No polyuria, no polydipsia, no heat intolerance, no cold intolerance   NEURO: No headaches, no dizziness, no convulsions, no tremors   MUSC:  No joint swellings, no arthralgias, no myalgias, no weakness   SKIN:   No rash, no ulcers, no dry skin   PSYCH:   No depression, no anxiety, no difficulty sleeping       Past Medical History:  Patient Active Problem List    Diagnosis Date Noted   • Primary hyperparathyroidism (HCC) 11/01/2021   • Closed bimalleolar fracture of left ankle with routine healing 10/20/2021   • History of pulmonary embolus (PE) 11/26/2019   • Chronic constipation 06/20/2016   • Vitamin D insufficiency 02/06/2015   • Factor 5 Leiden mutation, heterozygous (HCC) 02/06/2015       Past Surgical History:  Past Surgical History:   Procedure Laterality Date   • ORIF, ANKLE Left 9/21/2021    Procedure: ORIF, ANKLE - MEDIAL MEALLEOLOUS;  Surgeon: Jesús Holley M.D.;  Location: SURGERY [facility];  Service: Orthopedics   • PLASTIC SURGERY  11/2019    \"Sharif Gaspar\"   • VAGINAL HYSTERECTOMY SCOPE TOTAL  1/26/2010    Performed by STEVEN LIAO at SURGERY SAME DAY [facility]   • CYSTOSCOPY  1/26/2010    Performed by STEVEN LIAO at SURGERY SAME DAY [facility]   • LAPAROSCOPY     • OTHER      lasik   • TONSILLECTOMY          Allergies:  Patient has no known allergies.     Current Medications:    Current Outpatient Medications:   •  Calcium-Vitamin D-Vitamin K 650-12.5-40 MG-MCG-MCG " "Chew Tab, , Disp: , Rfl:   •  DOCUSATE SODIUM PO, , Disp: , Rfl:   •  Multiple Vitamin (MULTI-VITAMIN PO), Take 1 Tab by mouth every evening., Disp: , Rfl:   •  VITAMIN D PO, Take 1 Cap by mouth every evening., Disp: , Rfl:   •  Docusate Sodium (COLACE PO), Take 2 Caps by mouth every evening., Disp: , Rfl:     Social History:  Social History     Socioeconomic History   • Marital status:      Spouse name: Not on file   • Number of children: Not on file   • Years of education: Not on file   • Highest education level: Not on file   Occupational History   • Not on file   Tobacco Use   • Smoking status: Never Smoker   • Smokeless tobacco: Never Used   Vaping Use   • Vaping Use: Never used   Substance and Sexual Activity   • Alcohol use: Yes     Alcohol/week: 4.8 oz     Types: 8 Glasses of wine per week     Comment: 3-4 days a week   • Drug use: No   • Sexual activity: Not on file   Other Topics Concern   • Not on file   Social History Narrative   • Not on file     Social Determinants of Health     Financial Resource Strain: Not on file   Food Insecurity: Not on file   Transportation Needs: Not on file   Physical Activity: Not on file   Stress: Not on file   Social Connections: Not on file   Intimate Partner Violence: Not on file   Housing Stability: Not on file        Family History:   Family History   Problem Relation Age of Onset   • Hypertension Mother    • Hypertension Father    • Pulmonary Embolism Brother    • No Known Problems Brother    • No Known Problems Son    • No Known Problems Son    • No Known Problems Son          PHYSICAL EXAM:   Vital signs: /76 (BP Location: Right arm, Patient Position: Sitting, BP Cuff Size: Adult)   Pulse 93   Ht 1.715 m (5' 7.5\")   Wt 74.8 kg (165 lb)   LMP 01/08/2010   SpO2 99%   BMI 25.46 kg/m²   GENERAL: Well-developed, well-nourished  in no apparent distress.   EYE: No ocular and eyelid asymmetry, Anicteric sclerae,  PERRL, No exophthalmos or lidlag  HENT: " Hearing grossly intact, Normocephalic, atraumatic. Pink, moist mucous membranes, No exudate  NECK: Supple. Trachea midline.   CARDIOVASCULAR: Regular rate and rhythm. No murmurs, rubs, or gallops.   LUNGS: Clear to auscultation bilaterally   ABDOMEN: Soft, nontender with positive bowel sounds.   EXTREMITIES: No clubbing, cyanosis, or edema.   NEUROLOGICAL: Cranial nerves II-XII are grossly intact   Symmetric reflexes at the patella no proximal muscle weakness, No visible tremor with both outstretched hands  LYMPH: No cervical, supraclavicular,  adenopathy palpated.   SKIN: No rashes, lesions. Turgor is normal.    ASSESSMENT/PLAN:     1. Primary hyperparathyroidism (HCC)  Unstable.   Recommend ultrasound of neck and sestamibi scan to rule out parathyroid adenoma.  Vitamin D level is stable at this time.  Calcium level was undisturbed when calcium supplementation was held several days prior to testing.    DEXA scan is surprisingly positive for osteopenia despite the patient's constant weightbearing activity and unfortunately she had a traumatic fracture during physical activity 6 months ago.    Recommend current lab values plus imaging to determine if surgical intervention is required.      2. Osteopenia  Unstable.   Plan per #1.   Continue weight bearing exercises and Calcium with Vitamin D supplementation.   May require biphosphonate therapy after resolution of hyperparathyroidism.       3. Vitamin D Deficiency  Stable.   Continue taking taking Calcium with Vitamin D and MVI.     Complete labs now.   Complete US of Thyroid and Sestamibi Scan.   Next appointment in 4 months.     Thank you kindly for allowing me to participate in the endocrine care plan for this patient.    Nelly Valenzuela, APRN  03/25/22    CC:   Myla Xiao M.D.

## 2022-03-29 ENCOUNTER — HOSPITAL ENCOUNTER (OUTPATIENT)
Dept: LAB | Facility: MEDICAL CENTER | Age: 52
End: 2022-03-29
Attending: NURSE PRACTITIONER
Payer: COMMERCIAL

## 2022-03-29 DIAGNOSIS — M85.80 OSTEOPENIA, UNSPECIFIED LOCATION: ICD-10-CM

## 2022-03-29 DIAGNOSIS — E21.0 PRIMARY HYPERPARATHYROIDISM (HCC): ICD-10-CM

## 2022-03-29 LAB
25(OH)D3 SERPL-MCNC: 37 NG/ML (ref 30–100)
ALBUMIN SERPL BCP-MCNC: 4.7 G/DL (ref 3.2–4.9)
ALBUMIN/GLOB SERPL: 2.4 G/DL
ALP SERPL-CCNC: 140 U/L (ref 30–99)
ALT SERPL-CCNC: 19 U/L (ref 2–50)
ANION GAP SERPL CALC-SCNC: 9 MMOL/L (ref 7–16)
AST SERPL-CCNC: 23 U/L (ref 12–45)
BILIRUB SERPL-MCNC: 0.5 MG/DL (ref 0.1–1.5)
BUN SERPL-MCNC: 13 MG/DL (ref 8–22)
CALCIUM SERPL-MCNC: 11.4 MG/DL (ref 8.5–10.5)
CHLORIDE SERPL-SCNC: 105 MMOL/L (ref 96–112)
CO2 SERPL-SCNC: 25 MMOL/L (ref 20–33)
CREAT SERPL-MCNC: 0.73 MG/DL (ref 0.5–1.4)
GFR SERPLBLD CREATININE-BSD FMLA CKD-EPI: 99 ML/MIN/1.73 M 2
GLOBULIN SER CALC-MCNC: 2 G/DL (ref 1.9–3.5)
GLUCOSE SERPL-MCNC: 87 MG/DL (ref 65–99)
POTASSIUM SERPL-SCNC: 4.9 MMOL/L (ref 3.6–5.5)
PROT SERPL-MCNC: 6.7 G/DL (ref 6–8.2)
PTH-INTACT SERPL-MCNC: 129 PG/ML (ref 14–72)
SODIUM SERPL-SCNC: 139 MMOL/L (ref 135–145)
T4 FREE SERPL-MCNC: 1.34 NG/DL (ref 0.93–1.7)
TSH SERPL DL<=0.005 MIU/L-ACNC: 0.97 UIU/ML (ref 0.38–5.33)

## 2022-03-29 PROCEDURE — 84443 ASSAY THYROID STIM HORMONE: CPT

## 2022-03-29 PROCEDURE — 84439 ASSAY OF FREE THYROXINE: CPT

## 2022-03-29 PROCEDURE — 80053 COMPREHEN METABOLIC PANEL: CPT

## 2022-03-29 PROCEDURE — 82306 VITAMIN D 25 HYDROXY: CPT

## 2022-03-29 PROCEDURE — 83970 ASSAY OF PARATHORMONE: CPT

## 2022-03-30 ENCOUNTER — HOSPITAL ENCOUNTER (OUTPATIENT)
Dept: LAB | Facility: MEDICAL CENTER | Age: 52
End: 2022-03-30
Attending: NURSE PRACTITIONER
Payer: COMMERCIAL

## 2022-03-30 LAB — CA-I SERPL-SCNC: 1.5 MMOL/L (ref 1.1–1.3)

## 2022-03-30 PROCEDURE — 36415 COLL VENOUS BLD VENIPUNCTURE: CPT

## 2022-03-30 PROCEDURE — 82330 ASSAY OF CALCIUM: CPT

## 2022-04-04 ENCOUNTER — HOSPITAL ENCOUNTER (OUTPATIENT)
Dept: RADIOLOGY | Facility: MEDICAL CENTER | Age: 52
End: 2022-04-04
Attending: NURSE PRACTITIONER
Payer: COMMERCIAL

## 2022-04-04 DIAGNOSIS — M85.80 OSTEOPENIA, UNSPECIFIED LOCATION: ICD-10-CM

## 2022-04-04 DIAGNOSIS — E21.0 PRIMARY HYPERPARATHYROIDISM (HCC): ICD-10-CM

## 2022-04-04 PROCEDURE — 76536 US EXAM OF HEAD AND NECK: CPT

## 2022-04-11 ENCOUNTER — HOSPITAL ENCOUNTER (OUTPATIENT)
Dept: RADIOLOGY | Facility: MEDICAL CENTER | Age: 52
End: 2022-04-11
Attending: NURSE PRACTITIONER
Payer: COMMERCIAL

## 2022-04-11 DIAGNOSIS — M85.80 OSTEOPENIA, UNSPECIFIED LOCATION: ICD-10-CM

## 2022-04-11 PROCEDURE — A9500 TC99M SESTAMIBI: HCPCS

## 2022-04-15 DIAGNOSIS — E83.52 HYPERCALCEMIA: ICD-10-CM

## 2022-04-22 ENCOUNTER — TELEPHONE (OUTPATIENT)
Dept: ENDOCRINOLOGY | Facility: MEDICAL CENTER | Age: 52
End: 2022-04-22
Payer: COMMERCIAL

## 2022-04-22 NOTE — TELEPHONE ENCOUNTER
Called and verified with Dr Leonard's office, Patient is scheduled to see her on May 3, 2022 @ 9:30 am.

## 2022-04-27 ENCOUNTER — OFFICE VISIT (OUTPATIENT)
Dept: MEDICAL GROUP | Facility: MEDICAL CENTER | Age: 52
End: 2022-04-27
Payer: COMMERCIAL

## 2022-04-27 VITALS
BODY MASS INDEX: 24.84 KG/M2 | DIASTOLIC BLOOD PRESSURE: 62 MMHG | SYSTOLIC BLOOD PRESSURE: 104 MMHG | HEART RATE: 74 BPM | HEIGHT: 68 IN | OXYGEN SATURATION: 97 % | WEIGHT: 163.91 LBS | TEMPERATURE: 98.2 F

## 2022-04-27 DIAGNOSIS — E66.3 OVERWEIGHT (BMI 25.0-29.9): ICD-10-CM

## 2022-04-27 DIAGNOSIS — E21.0 PRIMARY HYPERPARATHYROIDISM (HCC): ICD-10-CM

## 2022-04-27 DIAGNOSIS — R63.5 WEIGHT GAIN: ICD-10-CM

## 2022-04-27 PROCEDURE — 99396 PREV VISIT EST AGE 40-64: CPT | Performed by: INTERNAL MEDICINE

## 2022-04-27 RX ORDER — PHENTERMINE HYDROCHLORIDE 37.5 MG/1
19-37.5 TABLET ORAL
Qty: 30 TABLET | Refills: 0 | Status: SHIPPED | OUTPATIENT
Start: 2022-04-27 | End: 2022-05-11

## 2022-04-27 RX ORDER — PHENTERMINE HYDROCHLORIDE 37.5 MG/1
19-37.5 TABLET ORAL
Qty: 30 TABLET | Refills: 0 | Status: SHIPPED | OUTPATIENT
Start: 2022-06-28 | End: 2022-05-11

## 2022-04-27 RX ORDER — PHENTERMINE HYDROCHLORIDE 37.5 MG/1
19-37.5 TABLET ORAL
Qty: 30 TABLET | Refills: 0 | Status: SHIPPED | OUTPATIENT
Start: 2022-05-28 | End: 2022-05-11

## 2022-04-27 ASSESSMENT — ENCOUNTER SYMPTOMS
MUSCULOSKELETAL NEGATIVE: 1
EYES NEGATIVE: 1
RESPIRATORY NEGATIVE: 1
NEUROLOGICAL NEGATIVE: 1
PSYCHIATRIC NEGATIVE: 1
GASTROINTESTINAL NEGATIVE: 1
CONSTITUTIONAL NEGATIVE: 1
CARDIOVASCULAR NEGATIVE: 1

## 2022-04-27 ASSESSMENT — PATIENT HEALTH QUESTIONNAIRE - PHQ9: CLINICAL INTERPRETATION OF PHQ2 SCORE: 0

## 2022-04-27 NOTE — ASSESSMENT & PLAN NOTE
This is an ongoing issue, patient had difficulties losing weight.  She would like to achieve a goal of 150 pounds.  She has been exercising vigorously 3-4 times a week, tracking her calories and staining caloric deficit.  Despite that, she has not been able to lose weight.   Patient is highly interested in taking phentermine again, as she has used that in the past.  She is aware, that this is a controlled substance, she has signed controlled substance agreement and provided urine sample for pain management screening.  She is aware, that refills will require in person appointment.  She does not use any other controlled substances or illicit drugs.

## 2022-04-27 NOTE — PROGRESS NOTES
Subjective:     Chief Complaint   Patient presents with   • Annual Wellness Visit     Diagnoses of Primary hyperparathyroidism (HCC), Weight gain, and Overweight (BMI 25.0-29.9) were pertinent to this visit.    HPI: Marcella is a pleasant 52 y.o. female who presents today unaccompanied for annual visit.  She is feeling well and denies active complaints.    Immunizations:   - COVID-19: Patient politely declined   - All age-appropriate vaccinations are up-to-date  Mammogram: 2021  Colorectal screenin2020, recall in 10 years.    Problem   Overweight (Bmi 25.0-29.9)    This is a chronic problem, patient says it has been hard to keep the weight off lately.  She does track her calories very strictly, she has been in caloric deficit for 4 to 5 months, she is exercising 3-4 times a week and she still has not been able to lose weight.  She has been prescribed phentermine in the past she says, it has significantly helped her to suppress the appetite.  She is interested in trying this medication again.  We went over potential side effects and adverse reactions.      Primary Hyperparathyroidism (Hcc)    This is ongoing problem.  Patient was diagnosed in 2021.  She was admitted by endocrinology, nuclear scan and ultrasounds of the thyroid and parathyroid glands were obtained and patient was found to have parathyroid adenoma in the right inferior thyroid bed region.  Patient has appointment with surgeon next week to plan for surgical excision.  She is currently asymptomatic.       Past Medical History:   Diagnosis Date   • Anesthesia     nausea   • Blood clotting disorder (HCC) 2019    L lung   • Factor 5 Leiden mutation, heterozygous (HCC)     Factor 5 Leiden   • PONV (postoperative nausea and vomiting)      Past Surgical History:   Procedure Laterality Date   • ORIF, ANKLE Left 2021    Procedure: ORIF, ANKLE - MEDIAL MEALLEOLOUS;  Surgeon: Jesús Holley M.D.;  [facility];   "Service: Orthopedics   • PLASTIC SURGERY  11/2019    \"Sharif Gaspar\"   • VAGINAL HYSTERECTOMY SCOPE TOTAL  1/26/2010    Performed by STEVEN LIAO at SURGERY SAME DAY [facility]   • CYSTOSCOPY  1/26/2010    Performed by STEVEN LIAO at SURGERY SAME DAY [facility]   • LAPAROSCOPY     • OTHER      lasik   • TONSILLECTOMY       Family History   Problem Relation Age of Onset   • Hypertension Mother    • Hypertension Father    • Pulmonary Embolism Brother    • No Known Problems Brother    • No Known Problems Son    • No Known Problems Son    • No Known Problems Son      Social History     Tobacco Use   • Smoking status: Never Smoker   • Smokeless tobacco: Never Used   Vaping Use   • Vaping Use: Never used   Substance Use Topics   • Alcohol use: Yes     Alcohol/week: 4.8 oz     Types: 8 Glasses of wine per week     Comment: 3-4 days a week   • Drug use: No       Current Outpatient Medications Ordered in Epic   Medication Sig Dispense Refill   • phentermine (ADIPEX-P) 37.5 MG tablet Take 0.5-1 Tablets by mouth every morning before breakfast for 30 days. From 04/27/2022 to 05/27/2022 30 Tablet 0   • [START ON 5/28/2022] phentermine (ADIPEX-P) 37.5 MG tablet Take 0.5-1 Tablets by mouth every morning before breakfast for 30 days. From May 28, 2022 to June 27, 2022 30 Tablet 0   • [START ON 6/28/2022] phentermine (ADIPEX-P) 37.5 MG tablet Take 0.5-1 Tablets by mouth every morning before breakfast for 30 days. From June 28, 2022 to July 28, 2022. 30 Tablet 0   • Calcium-Vitamin D-Vitamin K 650-12.5-40 MG-MCG-MCG Chew Tab      • DOCUSATE SODIUM PO      • Multiple Vitamin (MULTI-VITAMIN PO) Take 1 Tab by mouth every evening.       No current Epic-ordered facility-administered medications on file.       Review of Systems   Constitutional: Negative.    HENT: Negative.    Eyes: Negative.    Respiratory: Negative.    Cardiovascular: Negative.    Gastrointestinal: Negative.    Genitourinary: Negative.    Musculoskeletal: " "Negative.    Skin: Negative.    Neurological: Negative.    Endo/Heme/Allergies: Negative.    Psychiatric/Behavioral: Negative.      Objective:     Exam:  /62 (BP Location: Right arm, Patient Position: Sitting, BP Cuff Size: Adult)   Pulse 74   Temp 36.8 °C (98.2 °F) (Temporal)   Ht 1.715 m (5' 7.5\")   Wt 74.3 kg (163 lb 14.6 oz)   LMP 01/08/2010   SpO2 97%   BMI 25.29 kg/m²  Body mass index is 25.29 kg/m².    Physical Exam  Constitutional:       General: She is not in acute distress.     Appearance: Normal appearance. She is not toxic-appearing.   HENT:      Head: Normocephalic and atraumatic.      Mouth/Throat:      Mouth: Mucous membranes are moist.      Pharynx: Oropharynx is clear. No oropharyngeal exudate or posterior oropharyngeal erythema.   Eyes:      General: No scleral icterus.  Cardiovascular:      Rate and Rhythm: Normal rate and regular rhythm.      Pulses: Normal pulses.      Heart sounds: Normal heart sounds.   Pulmonary:      Effort: Pulmonary effort is normal.      Breath sounds: Normal breath sounds.   Abdominal:      General: Abdomen is flat. Bowel sounds are normal. There is no distension.      Palpations: Abdomen is soft.      Tenderness: There is no abdominal tenderness. There is no guarding or rebound.   Skin:     General: Skin is warm and dry.   Neurological:      General: No focal deficit present.      Mental Status: She is alert and oriented to person, place, and time.      Gait: Gait normal.   Psychiatric:         Mood and Affect: Mood normal.         Behavior: Behavior normal.         Thought Content: Thought content normal.         Judgment: Judgment normal.       Labs: Reviewed CMP, PTH, vitamin D, TSH from 3/29/2022.    Assessment & Plan:   Marcella  is a pleasant 52 y.o. female with the following -     Problem List Items Addressed This Visit     Overweight (BMI 25.0-29.9)     This is an ongoing issue, patient had difficulties losing weight.  She would like to achieve a " goal of 150 pounds.  She has been exercising vigorously 3-4 times a week, tracking her calories and staining caloric deficit.  Despite that, she has not been able to lose weight.   Patient is highly interested in taking phentermine again, as she has used that in the past.  She is aware, that this is a controlled substance, she has signed controlled substance agreement and provided urine sample for pain management screening.  She is aware, that refills will require in person appointment.  She does not use any other controlled substances or illicit drugs.         Primary hyperparathyroidism (HCC)     This is an ongoing problem, currently managed by endocrinology.  Nuclear scan and ultrasound of the thyroid and parathyroid glands reviewed parathyroid adenoma in the right inferior thyroid bed region.  Patient is currently asymptomatic.  Plan for surgical intervention, patient has appointment with surgeon next week.           Other Visit Diagnoses     Weight gain        Relevant Medications    phentermine (ADIPEX-P) 37.5 MG tablet    phentermine (ADIPEX-P) 37.5 MG tablet (Start on 5/28/2022)    phentermine (ADIPEX-P) 37.5 MG tablet (Start on 6/28/2022)    Other Relevant Orders    Controlled Substance Treatment Agreement    Pain Management Screen        Return in about 6 months (around 10/27/2022).    Please note that this dictation was created using voice recognition software. I have made every reasonable attempt to correct obvious errors, but I expect that there are errors of grammar and possibly content that I did not discover before finalizing the note.

## 2022-04-27 NOTE — ASSESSMENT & PLAN NOTE
This is an ongoing problem, currently managed by endocrinology.  Nuclear scan and ultrasound of the thyroid and parathyroid glands reviewed parathyroid adenoma in the right inferior thyroid bed region.  Patient is currently asymptomatic.  Plan for surgical intervention, patient has appointment with surgeon next week.

## 2022-05-11 ENCOUNTER — PRE-ADMISSION TESTING (OUTPATIENT)
Dept: ADMISSIONS | Facility: MEDICAL CENTER | Age: 52
End: 2022-05-11
Attending: SURGERY
Payer: COMMERCIAL

## 2022-05-11 DIAGNOSIS — Z01.812 PRE-OPERATIVE LABORATORY EXAMINATION: ICD-10-CM

## 2022-05-11 LAB
ANION GAP SERPL CALC-SCNC: 9 MMOL/L (ref 7–16)
BUN SERPL-MCNC: 13 MG/DL (ref 8–22)
CALCIUM SERPL-MCNC: 11.2 MG/DL (ref 8.5–10.5)
CHLORIDE SERPL-SCNC: 107 MMOL/L (ref 96–112)
CO2 SERPL-SCNC: 25 MMOL/L (ref 20–33)
CREAT SERPL-MCNC: 0.69 MG/DL (ref 0.5–1.4)
ERYTHROCYTE [DISTWIDTH] IN BLOOD BY AUTOMATED COUNT: 44.5 FL (ref 35.9–50)
GFR SERPLBLD CREATININE-BSD FMLA CKD-EPI: 104 ML/MIN/1.73 M 2
GLUCOSE SERPL-MCNC: 88 MG/DL (ref 65–99)
HCT VFR BLD AUTO: 42.5 % (ref 37–47)
HGB BLD-MCNC: 14.7 G/DL (ref 12–16)
MCH RBC QN AUTO: 32.4 PG (ref 27–33)
MCHC RBC AUTO-ENTMCNC: 34.6 G/DL (ref 33.6–35)
MCV RBC AUTO: 93.6 FL (ref 81.4–97.8)
PLATELET # BLD AUTO: 147 K/UL (ref 164–446)
PMV BLD AUTO: 12.8 FL (ref 9–12.9)
POTASSIUM SERPL-SCNC: 4.5 MMOL/L (ref 3.6–5.5)
RBC # BLD AUTO: 4.54 M/UL (ref 4.2–5.4)
SODIUM SERPL-SCNC: 141 MMOL/L (ref 135–145)
WBC # BLD AUTO: 4.7 K/UL (ref 4.8–10.8)

## 2022-05-11 PROCEDURE — 85027 COMPLETE CBC AUTOMATED: CPT

## 2022-05-11 PROCEDURE — 36415 COLL VENOUS BLD VENIPUNCTURE: CPT

## 2022-05-11 PROCEDURE — 80048 BASIC METABOLIC PNL TOTAL CA: CPT

## 2022-05-18 ENCOUNTER — ANESTHESIA (OUTPATIENT)
Dept: SURGERY | Facility: MEDICAL CENTER | Age: 52
End: 2022-05-18
Payer: COMMERCIAL

## 2022-05-18 ENCOUNTER — HOSPITAL ENCOUNTER (OUTPATIENT)
Facility: MEDICAL CENTER | Age: 52
End: 2022-05-18
Attending: SURGERY | Admitting: SURGERY
Payer: COMMERCIAL

## 2022-05-18 ENCOUNTER — ANESTHESIA EVENT (OUTPATIENT)
Dept: SURGERY | Facility: MEDICAL CENTER | Age: 52
End: 2022-05-18
Payer: COMMERCIAL

## 2022-05-18 VITALS
HEART RATE: 56 BPM | HEIGHT: 68 IN | TEMPERATURE: 97 F | RESPIRATION RATE: 16 BRPM | BODY MASS INDEX: 24.83 KG/M2 | OXYGEN SATURATION: 95 % | DIASTOLIC BLOOD PRESSURE: 54 MMHG | SYSTOLIC BLOOD PRESSURE: 95 MMHG | WEIGHT: 163.8 LBS

## 2022-05-18 DIAGNOSIS — E83.51 IATROGENIC HYPOCALCEMIA: ICD-10-CM

## 2022-05-18 DIAGNOSIS — G89.18 POSTOPERATIVE PAIN: ICD-10-CM

## 2022-05-18 PROBLEM — E21.0 PRIMARY HYPERPARATHYROIDISM (HCC): Status: RESOLVED | Noted: 2021-11-01 | Resolved: 2022-05-18

## 2022-05-18 LAB
COLLECT TME BLD: 900 HH:MM
COLLECT TME BLD: 919 HH:MM
COLLECT TME BLD: 923 HH:MM
COLLECT TME BLD: 929 HH:MM
PATHOLOGY CONSULT NOTE: NORMAL
PTH-INTACT IO % DIF SERPL: 78 %
PTH-INTACT IO % DIF SERPL: 84 %
PTH-INTACT SP1 SERPL-MCNC: 222 PG/ML (ref 14–72)
PTH-INTACT SP2 SERPL-MCNC: 237 PG/ML
PTH-INTACT SP3 SERPL-MCNC: 52.9 PG/ML
PTH-INTACT SP4 SERPL-MCNC: 37.2 PG/ML

## 2022-05-18 PROCEDURE — 501838 HCHG SUTURE GENERAL: Performed by: SURGERY

## 2022-05-18 PROCEDURE — 160046 HCHG PACU - 1ST 60 MINS PHASE II: Performed by: SURGERY

## 2022-05-18 PROCEDURE — 83970 ASSAY OF PARATHORMONE: CPT | Mod: 91

## 2022-05-18 PROCEDURE — 700101 HCHG RX REV CODE 250: Performed by: INTERNAL MEDICINE

## 2022-05-18 PROCEDURE — 160047 HCHG PACU  - EA ADDL 30 MINS PHASE II: Performed by: SURGERY

## 2022-05-18 PROCEDURE — 160041 HCHG SURGERY MINUTES - EA ADDL 1 MIN LEVEL 4: Performed by: SURGERY

## 2022-05-18 PROCEDURE — 88305 TISSUE EXAM BY PATHOLOGIST: CPT

## 2022-05-18 PROCEDURE — A9270 NON-COVERED ITEM OR SERVICE: HCPCS | Performed by: INTERNAL MEDICINE

## 2022-05-18 PROCEDURE — 88331 PATH CONSLTJ SURG 1 BLK 1SPC: CPT

## 2022-05-18 PROCEDURE — 00320 ANES ALL PX NECK NOS 1YR/>: CPT | Performed by: INTERNAL MEDICINE

## 2022-05-18 PROCEDURE — 700105 HCHG RX REV CODE 258: Performed by: SURGERY

## 2022-05-18 PROCEDURE — 160048 HCHG OR STATISTICAL LEVEL 1-5: Performed by: SURGERY

## 2022-05-18 PROCEDURE — C1725 CATH, TRANSLUMIN NON-LASER: HCPCS | Performed by: SURGERY

## 2022-05-18 PROCEDURE — 160035 HCHG PACU - 1ST 60 MINS PHASE I: Performed by: SURGERY

## 2022-05-18 PROCEDURE — 160009 HCHG ANES TIME/MIN: Performed by: SURGERY

## 2022-05-18 PROCEDURE — 160002 HCHG RECOVERY MINUTES (STAT): Performed by: SURGERY

## 2022-05-18 PROCEDURE — 700111 HCHG RX REV CODE 636 W/ 250 OVERRIDE (IP): Performed by: INTERNAL MEDICINE

## 2022-05-18 PROCEDURE — 700102 HCHG RX REV CODE 250 W/ 637 OVERRIDE(OP): Performed by: INTERNAL MEDICINE

## 2022-05-18 PROCEDURE — 160029 HCHG SURGERY MINUTES - 1ST 30 MINS LEVEL 4: Performed by: SURGERY

## 2022-05-18 PROCEDURE — 160025 RECOVERY II MINUTES (STATS): Performed by: SURGERY

## 2022-05-18 RX ORDER — SUCCINYLCHOLINE CHLORIDE 20 MG/ML
INJECTION INTRAMUSCULAR; INTRAVENOUS PRN
Status: DISCONTINUED | OUTPATIENT
Start: 2022-05-18 | End: 2022-05-18 | Stop reason: SURG

## 2022-05-18 RX ORDER — HYDROCODONE BITARTRATE AND ACETAMINOPHEN 5; 325 MG/1; MG/1
1 TABLET ORAL EVERY 4 HOURS PRN
Qty: 20 TABLET | Refills: 0 | Status: SHIPPED | OUTPATIENT
Start: 2022-05-18 | End: 2022-05-25

## 2022-05-18 RX ORDER — OXYCODONE HCL 5 MG/5 ML
5 SOLUTION, ORAL ORAL
Status: COMPLETED | OUTPATIENT
Start: 2022-05-18 | End: 2022-05-18

## 2022-05-18 RX ORDER — MIDAZOLAM HYDROCHLORIDE 1 MG/ML
INJECTION INTRAMUSCULAR; INTRAVENOUS PRN
Status: DISCONTINUED | OUTPATIENT
Start: 2022-05-18 | End: 2022-05-18 | Stop reason: SURG

## 2022-05-18 RX ORDER — HYDROMORPHONE HYDROCHLORIDE 1 MG/ML
0.2 INJECTION, SOLUTION INTRAMUSCULAR; INTRAVENOUS; SUBCUTANEOUS
Status: DISCONTINUED | OUTPATIENT
Start: 2022-05-18 | End: 2022-05-18 | Stop reason: HOSPADM

## 2022-05-18 RX ORDER — ONDANSETRON 2 MG/ML
4 INJECTION INTRAMUSCULAR; INTRAVENOUS
Status: DISCONTINUED | OUTPATIENT
Start: 2022-05-18 | End: 2022-05-18 | Stop reason: HOSPADM

## 2022-05-18 RX ORDER — LIDOCAINE HYDROCHLORIDE 20 MG/ML
INJECTION, SOLUTION EPIDURAL; INFILTRATION; INTRACAUDAL; PERINEURAL PRN
Status: DISCONTINUED | OUTPATIENT
Start: 2022-05-18 | End: 2022-05-18 | Stop reason: SURG

## 2022-05-18 RX ORDER — SODIUM CHLORIDE, SODIUM LACTATE, POTASSIUM CHLORIDE, CALCIUM CHLORIDE 600; 310; 30; 20 MG/100ML; MG/100ML; MG/100ML; MG/100ML
INJECTION, SOLUTION INTRAVENOUS CONTINUOUS
Status: DISCONTINUED | OUTPATIENT
Start: 2022-05-18 | End: 2022-05-18 | Stop reason: HOSPADM

## 2022-05-18 RX ORDER — DEXMEDETOMIDINE HYDROCHLORIDE 100 UG/ML
INJECTION, SOLUTION INTRAVENOUS PRN
Status: DISCONTINUED | OUTPATIENT
Start: 2022-05-18 | End: 2022-05-18 | Stop reason: SURG

## 2022-05-18 RX ORDER — CALCIUM CARBONATE 1000 MG/1
2000 TABLET, CHEWABLE ORAL EVERY MORNING
Qty: 90 TABLET | Refills: 3 | Status: SHIPPED | OUTPATIENT
Start: 2022-05-18 | End: 2022-10-27

## 2022-05-18 RX ORDER — MEPERIDINE HYDROCHLORIDE 25 MG/ML
12.5 INJECTION INTRAMUSCULAR; INTRAVENOUS; SUBCUTANEOUS
Status: DISCONTINUED | OUTPATIENT
Start: 2022-05-18 | End: 2022-05-18 | Stop reason: HOSPADM

## 2022-05-18 RX ORDER — HYDRALAZINE HYDROCHLORIDE 20 MG/ML
5 INJECTION INTRAMUSCULAR; INTRAVENOUS
Status: DISCONTINUED | OUTPATIENT
Start: 2022-05-18 | End: 2022-05-18 | Stop reason: HOSPADM

## 2022-05-18 RX ORDER — HYDROMORPHONE HYDROCHLORIDE 1 MG/ML
0.4 INJECTION, SOLUTION INTRAMUSCULAR; INTRAVENOUS; SUBCUTANEOUS
Status: DISCONTINUED | OUTPATIENT
Start: 2022-05-18 | End: 2022-05-18 | Stop reason: HOSPADM

## 2022-05-18 RX ORDER — CEFAZOLIN SODIUM 1 G/3ML
INJECTION, POWDER, FOR SOLUTION INTRAMUSCULAR; INTRAVENOUS PRN
Status: DISCONTINUED | OUTPATIENT
Start: 2022-05-18 | End: 2022-05-18 | Stop reason: SURG

## 2022-05-18 RX ORDER — ONDANSETRON 2 MG/ML
INJECTION INTRAMUSCULAR; INTRAVENOUS PRN
Status: DISCONTINUED | OUTPATIENT
Start: 2022-05-18 | End: 2022-05-18 | Stop reason: SURG

## 2022-05-18 RX ORDER — HALOPERIDOL 5 MG/ML
1 INJECTION INTRAMUSCULAR
Status: DISCONTINUED | OUTPATIENT
Start: 2022-05-18 | End: 2022-05-18 | Stop reason: HOSPADM

## 2022-05-18 RX ORDER — HYDROMORPHONE HYDROCHLORIDE 1 MG/ML
0.1 INJECTION, SOLUTION INTRAMUSCULAR; INTRAVENOUS; SUBCUTANEOUS
Status: DISCONTINUED | OUTPATIENT
Start: 2022-05-18 | End: 2022-05-18 | Stop reason: HOSPADM

## 2022-05-18 RX ORDER — DIPHENHYDRAMINE HYDROCHLORIDE 50 MG/ML
12.5 INJECTION INTRAMUSCULAR; INTRAVENOUS
Status: DISCONTINUED | OUTPATIENT
Start: 2022-05-18 | End: 2022-05-18 | Stop reason: HOSPADM

## 2022-05-18 RX ORDER — SCOLOPAMINE TRANSDERMAL SYSTEM 1 MG/1
1 PATCH, EXTENDED RELEASE TRANSDERMAL
Status: DISCONTINUED | OUTPATIENT
Start: 2022-05-18 | End: 2022-05-18 | Stop reason: HOSPADM

## 2022-05-18 RX ORDER — ALBUTEROL SULFATE 2.5 MG/3ML
2.5 SOLUTION RESPIRATORY (INHALATION)
Status: DISCONTINUED | OUTPATIENT
Start: 2022-05-18 | End: 2022-05-18 | Stop reason: HOSPADM

## 2022-05-18 RX ORDER — OXYCODONE HCL 5 MG/5 ML
10 SOLUTION, ORAL ORAL
Status: COMPLETED | OUTPATIENT
Start: 2022-05-18 | End: 2022-05-18

## 2022-05-18 RX ORDER — DEXAMETHASONE SODIUM PHOSPHATE 4 MG/ML
INJECTION, SOLUTION INTRA-ARTICULAR; INTRALESIONAL; INTRAMUSCULAR; INTRAVENOUS; SOFT TISSUE PRN
Status: DISCONTINUED | OUTPATIENT
Start: 2022-05-18 | End: 2022-05-18 | Stop reason: SURG

## 2022-05-18 RX ADMIN — LIDOCAINE HYDROCHLORIDE 50 MG: 20 INJECTION, SOLUTION EPIDURAL; INFILTRATION; INTRACAUDAL at 08:54

## 2022-05-18 RX ADMIN — MIDAZOLAM HYDROCHLORIDE 2 MG: 1 INJECTION, SOLUTION INTRAMUSCULAR; INTRAVENOUS at 08:49

## 2022-05-18 RX ADMIN — SODIUM CHLORIDE, POTASSIUM CHLORIDE, SODIUM LACTATE AND CALCIUM CHLORIDE: 600; 310; 30; 20 INJECTION, SOLUTION INTRAVENOUS at 07:57

## 2022-05-18 RX ADMIN — OXYCODONE HYDROCHLORIDE 5 MG: 5 SOLUTION ORAL at 10:53

## 2022-05-18 RX ADMIN — FENTANYL CITRATE 50 MCG: 50 INJECTION, SOLUTION INTRAMUSCULAR; INTRAVENOUS at 09:00

## 2022-05-18 RX ADMIN — EPHEDRINE SULFATE 5 MG: 50 INJECTION, SOLUTION INTRAVENOUS at 09:38

## 2022-05-18 RX ADMIN — FENTANYL CITRATE 50 MCG: 50 INJECTION, SOLUTION INTRAMUSCULAR; INTRAVENOUS at 08:49

## 2022-05-18 RX ADMIN — DEXMEDETOMIDINE 10 MCG: 200 INJECTION, SOLUTION INTRAVENOUS at 09:11

## 2022-05-18 RX ADMIN — ONDANSETRON 4 MG: 2 INJECTION INTRAMUSCULAR; INTRAVENOUS at 09:32

## 2022-05-18 RX ADMIN — PROPOFOL 150 MG: 10 INJECTION, EMULSION INTRAVENOUS at 08:54

## 2022-05-18 RX ADMIN — DEXMEDETOMIDINE 30 MCG: 200 INJECTION, SOLUTION INTRAVENOUS at 09:05

## 2022-05-18 RX ADMIN — CEFAZOLIN 2 G: 330 INJECTION, POWDER, FOR SOLUTION INTRAMUSCULAR; INTRAVENOUS at 09:06

## 2022-05-18 RX ADMIN — FENTANYL CITRATE 100 MCG: 50 INJECTION, SOLUTION INTRAMUSCULAR; INTRAVENOUS at 08:54

## 2022-05-18 RX ADMIN — SCOPALAMINE 1 PATCH: 1 PATCH, EXTENDED RELEASE TRANSDERMAL at 07:40

## 2022-05-18 RX ADMIN — EPHEDRINE SULFATE 10 MG: 50 INJECTION, SOLUTION INTRAVENOUS at 08:57

## 2022-05-18 RX ADMIN — DEXAMETHASONE SODIUM PHOSPHATE 8 MG: 4 INJECTION, SOLUTION INTRA-ARTICULAR; INTRALESIONAL; INTRAMUSCULAR; INTRAVENOUS; SOFT TISSUE at 08:54

## 2022-05-18 RX ADMIN — SUCCINYLCHOLINE CHLORIDE 80 MG: 20 INJECTION, SOLUTION INTRAMUSCULAR; INTRAVENOUS; PARENTERAL at 08:54

## 2022-05-18 ASSESSMENT — FIBROSIS 4 INDEX: FIB4 SCORE: 1.87

## 2022-05-18 ASSESSMENT — PAIN DESCRIPTION - PAIN TYPE
TYPE: SURGICAL PAIN

## 2022-05-18 NOTE — DISCHARGE INSTRUCTIONS
ACTIVITY: Rest and take it easy for the first 24 hours.  A responsible adult is recommended to remain with you during that time.  It is normal to feel sleepy.  We encourage you to not do anything that requires balance, judgment or coordination.    MILD FLU-LIKE SYMPTOMS ARE NORMAL. YOU MAY EXPERIENCE GENERALIZED MUSCLE ACHES, THROAT IRRITATION, HEADACHE AND/OR SOME NAUSEA.    FOR 24 HOURS DO NOT:  Drive, operate machinery or run household appliances.  Drink beer or alcoholic beverages.   Make important decisions or sign legal documents.    Post-Operative Discharge Instructions for Parathyroidectomy      ACTIVITY:    Most patients are able to return to a full-time work schedule in 1-2 weeks; however this may vary according to your job. It may take longer to return to heavy physical or other demanding work, or shorter if you are feeling well.      Do NOT drive a car until you are able to turn the neck side to side, which may take 1-2 weeks.       Do NOT drive while you are taking pain medicines.      DIET:    You may have temporary throat discomfort or difficulty swallowing. This is due to the surgery around your larynx (voice box) and esophagus (swallowing tube). These symptoms will gradually improve over the course of several weeks.       Drink and eat foods that can be swallowed easily, e.g. juice, soup, gelatin, applesauce, scrambled eggs or mashed potatoes.       You may be able to return to your usual diet in a couple of days.      INCISION CARE:    You may remove the outer dressing 48 hours after surgery.      You may shower 48 hours after surgery but please do not swim or soak in a tub for at least 2 weeks. After you finish showering, just pat your incision dry. If it is draining clear fluid, you can cover it with a dry dressing (such as gauze). Do NOT scrub with soap or washcloth for the first 10 days.       Mild swelling at the incision site will go away in 4-6 weeks. The pink line will slowly fade to  white during the next 6-12 months.       Use a sunscreen (SPF#30 or higher) or wear a scarf for protection if in the sun for the first 6 months to a year as the sun can darken your scar.       You may begin to use a hypoallergenic moisturizing cream (no vitamin E, Mederma, or other “scar” creams) along the incision after 2 weeks.      COMMON PROBLEMS:    Numbness of the skin under the chin or above the incision is normal and should go away in a few weeks.       You may feel a lump or pressure in your throat sensation when swallowing for a few weeks.       Your incision may feel itchy while it heals. Avoid rubbing or scratching if possible.       You may feel neck stiffness, tightness, a pulling feeling, mild aching chest discomfort, headache, ear pain or congestion. Take a mild pain medicine such as Tylenol or Advil. Put heat on the area using a hot water bottle, heating pad or warm shower.       Some people prefer to sleep with an extra pillow for the first few days after the surgery, this helps keep swelling around your incision to a minimum.       Your voice may be hoarse or weak. Pitch or tone may change. You may have difficulty singing. This usually goes back to normal over 6 weeks to 6 months.       After surgery, you may notice a change in your mood, emotional ups and downs, depression, irritability or fatigue and weakness. These changes will get better as time passes.       You do not need to be at bed rest, being active is normally well tolerated within reason.      MEDICATIONS:    Take 2000 mg of TUMS over-the-counter after your surgery (two tablets). If you begin experiencing symptoms of low calcium such as numbness or tingling in your fingertips or around your mouth, you may increase the amount of TUMS you take up to 10 tablets per day. If symptoms persist on 10 TUMS a day, call my office to inform me.      Please note that it is common for the calcium level to be low following removal of the  parathyroid, and you may experience numbness or tingling, which is a sign of low calcium. If this happens, it should improve when you take your calcium supplements. If it does not, please call your doctor.       Please resume your pre-hospital medications. You should follow-up with your primary care physician regarding new prescriptions and refills.       We will supply you with a prescription for a mild narcotic pain medication. You are not required to take it. If you do take it, please do not drive or drink alcohol as these in combination may make you drowsy. Most patients do not need strong pain medicine by the time you leave the hospital. You can take Tylenol (acetaminophen) or ibuprofen (e.g. Advil) as needed.       If you are taking supplemental calcium or narcotics, you may also want to take a stool softener, such as over-the-counter Colace or Senna, to avoid constipation. Stay hydrated by drinking some extra water.      LABORATORY DRAW:  You have been provided with a prescription to have you calcium levels drawn prior to your follow up visit. The order is at the laboratory that you designated at as your lab of choice at the office. Please have this drawn ~2 days prior to your follow-up visit.     CALL YOUR DOCTOR IF:    Call your doctor or go to the Emergency Room if you have fever (temperature greater than 100.5), chills, lightheadedness, shortness of breath, difficulty breathing, nausea, vomiting, numbness or tingling in your fingers, hands, or mouth, muscle spasms, or if you notice signs of wound infection (redness, tenderness, or drainage from the incision). Please also call or go to the Emergency Room if you have any other urgent concerns.      FOLLOW-UP:   With Dr. Leonard in one week, call to schedule, [phone].     Babs Leonard M.D.  [facility]     [address] [place] [address] [address]  [place] [id number]    DIET: To avoid nausea, slowly advance diet as tolerated, avoiding spicy or greasy foods for  the first day.  Add more substantial food to your diet according to your physician's instructions.  Babies can be fed formula or breast milk as soon as they are hungry.  INCREASE FLUIDS AND FIBER TO AVOID CONSTIPATION.      You should CALL YOUR PHYSICIAN if you develop:  Fever greater than 101 degrees F.  Pain not relieved by medication, or persistent nausea or vomiting.  Excessive bleeding (blood soaking through dressing) or unexpected drainage from the wound.  Extreme redness or swelling around the incision site, drainage of pus or foul smelling drainage.  Inability to urinate or empty your bladder within 8 hours.  Problems with breathing or chest pain.    You should call 911 if you develop problems with breathing or chest pain.  If you are unable to contact your doctor or surgical center, you should go to the nearest emergency room or urgent care center.     If any questions arise, call your doctor.  If your doctor is not available, please feel free to call the Surgical Center at [phone].     A registered nurse may call you a few days after your surgery to see how you are doing after your procedure.    MEDICATIONS: Resume taking daily medication.  Take prescribed pain medication with food.  If no medication is prescribed, you may take non-aspirin pain medication if needed.  PAIN MEDICATION CAN BE VERY CONSTIPATING.  Take a stool softener or laxative such as senokot, pericolace, or milk of magnesia if needed.    Prescription given for Norco and Tums .  Last pain medication given  ___________________________________________________________________________________.    If your physician has prescribed pain medication that includes Acetaminophen (Tylenol), do not take additional Acetaminophen (Tylenol) while taking the prescribed medication.    Depression / Suicide Risk    As you are discharged from this Critical access hospital facility, it is important to learn how to keep safe from harming yourself.    Recognize the  warning signs:  Abrupt changes in personality, positive or negative- including increase in energy   Giving away possessions  Change in eating patterns- significant weight changes-  positive or negative  Change in sleeping patterns- unable to sleep or sleeping all the time   Unwillingness or inability to communicate  Depression  Unusual sadness, discouragement and loneliness  Talk of wanting to die  Neglect of personal appearance   Rebelliousness- reckless behavior  Withdrawal from people/activities they love  Confusion- inability to concentrate     If you or a loved one observes any of these behaviors or has concerns about self-harm, here's what you can do:  Talk about it- your feelings and reasons for harming yourself  Remove any means that you might use to hurt yourself (examples: pills, rope, extension cords, firearm)  Get professional help from the community (Mental Health, Substance Abuse, psychological counseling)  Do not be alone:Call your Safe Contact- someone whom you trust who will be there for you.  Call your local CRISIS HOTLINE 709-7725 or 127-146-4296  Call your local Children's Mobile Crisis Response Team [place] (134) 514-2988 or www.Joome  Call the toll free National Suicide Prevention Hotlines   National Suicide Prevention Lifeline 015-221-CXLK (0444)  National Hope Line Network 800-SUICIDE (816-3210)

## 2022-05-18 NOTE — PROGRESS NOTES
0961- Patient arrived in PACU and was connected to monitors. Vital signs are stable, oral airway in place, patient is on 8L O2. Report received from anesthesiologist and OR RN. Dressing is clean, dry and intact.    1000- Handoff given to TEE Marina.

## 2022-05-18 NOTE — OR NURSING
1000 Handoff from Courtney RN, pt sleeping, oral airway present, respirations even and unlabored. Anterior neck dressing CDI. Art line present.    1010 Pt awakening, denies pain and nausea    1053 Pain meds given per MAR    1130 PaIn improved, denies nausea, tolerating ice water, dressed, Art line DC'd    1200 DC instructions reviewed with pt and , verbal understanding expressed, PIV DC'd    1210 Pt DC'd home with spouse via WC escort out.

## 2022-05-18 NOTE — ANESTHESIA TIME REPORT
Anesthesia Start and Stop Event Times     Date Time Event    5/18/2022 0846 Ready for Procedure     0849 Anesthesia Start     0954 Anesthesia Stop        Responsible Staff  05/18/22    Name Role Begin End    Ismael Goff M.D. Anesth 0849 [id number]        Overtime Reason:  no overtime (within assigned shift)    Comments:

## 2022-05-18 NOTE — ANESTHESIA PROCEDURE NOTES
Arterial Line  Performed by: Ismael Goff M.D.  Authorized by: Ismael Goff M.D.     Start Time:  5/18/2022 9:00 AM  End Time:  5/18/2022 9:01 AM  Localization: ultrasound guidance  Image captured, interpreted and electronically stored.    Patient Location:  OR  Indication: continuous blood pressure monitoring        Catheter Size:  20 G  Seldinger Technique?: Yes    Laterality:  Right  Site:  Radial artery  Line Secured:  Antimicrobial disc, tape and transparent dressing  Events: patient tolerated procedure well with no complications

## 2022-05-18 NOTE — ANESTHESIA PREPROCEDURE EVALUATION
Case: [id number] Date/Time: 05/18/22 0845    Procedure: PARATHYROIDECTOMY    Pre-op diagnosis: PRIMARY HYPERPARATHYROIDISM    Location: [facility] ROOM 23 / SURGERY SAME DAY [facility]    Surgeons: Babs Leonard M.D.          Relevant Problems   NEURO   (positive) History of pulmonary embolus (PE)      Other   (positive) Factor 5 Leiden mutation, heterozygous (HCC)       Physical Exam    Airway   Mallampati: II  TM distance: >3 FB  Neck ROM: full       Cardiovascular - normal exam  Rhythm: regular  Rate: normal  (-) murmur     Dental - normal exam           Pulmonary - normal exam  Breath sounds clear to auscultation     Abdominal    Neurological - normal exam                 Anesthesia Plan    ASA 2       Plan - general       Airway plan will be ETT    (Monitored ETT and A-line. )      Induction: intravenous    Postoperative Plan: Postoperative administration of opioids is intended.    Pertinent diagnostic labs and testing reviewed    Informed Consent:    Anesthetic plan and risks discussed with patient.    Use of blood products discussed with: patient whom consented to blood products.

## 2022-05-18 NOTE — OP REPORT
Operative Report     Date: 5/18/2022    Surgeon: Babs Leonard M.D.     Assistant: JULIET Garcia    My assistant, JULIET Garcia, was medically necessary for this procedure. He placed the precordial electrodes were placed by the surgical assistant, who then monitored the recurrent laryngeal nerve throughout the procedure, as well as retracted the tissues to aid in my visualization of the parathyroid tissue, and assisted with wound closure.    Anesthesiologist: Shell HADDAD    Pre-operative Diagnosis: E 21.0 primary hyperparathyroidism     Post-operative Diagnosis: same, right inferior parathyroid adenoma     Procedure:   1. right minimally invasive parathyroid exploration with intraoperative PTH monitoring (68651 Parathyroidectomy or exploration of parathyroid)  2. unilateral recurrent laryngeal nerve monitoring     Findings: Enlarged right inferior parathyroid adenoma. Intraoperative PTH monitoring was performed with levels drawn at appropriate intervals. There was a 84% drop from the highest level, with the final level being 37 picograms/deciliter.     Procedure in detail: The patient was identified in the pre-operative holding area and brought to the operating room. Correct side and site were identified. GETA was smoothly induced. The patient was prepped and draped in the usual sterile fashion.     With the patient in the supine position, the head of the bed slightly elevated, the neck slightly extended, and the patient intubated with a NIM endotracheal tube, the precordial electrodes were placed by the surgical assistant, who then monitored the recurrent laryngeal nerve throughout the procedure.     The neck was prepared with betadiene and draped in the usual fashion. The neck was entered through a short lower transverse cervical incision and carried down through the platysma. Subplatysmal flaps were dissected superiorly and inferiorly down to the sternal notch. The midline cervical fascia was incised down to  the capsule of the thyroid.     The strap muscles were mobilized off the right thyroid lobe, and with careful dissection we identified an enlarged appearing right inferior gland adjacent to the lower pole of the thyroid. The vascular pedicles were divided with the Ligasure device. The gland was sent for immediate pathologic exam, which revealed a hypercellular enlarged parathyroid gland.     Intraoperative PTH monitoring was performed with levels drawn at appropriate intervals. There was a 84% drop from the highest level, with the final level being 37 picograms/deciliter.     The integrity of the right laryngeal nerve was documented. Hemoblast was placed in the right side of the neck. The midline cervical fascia was reapproximated with running 3-0 Vicryl. Platysma was reapproximated with interrupted 3-0 Vicryl. The skin was closed with running monocryl.     The patient was awakened from general anesthetic, and was taken to the recovery room in stable condition.     Sponge and needle counts were correct at the end of the case.     Specimen: right inferior parathyroid adenoma     EBL: minimal     Dispo: stable, extubated, to PACU     Babs Leonard M.D.   [facility]   [phone]

## 2022-05-18 NOTE — ANESTHESIA PROCEDURE NOTES
Airway    Date/Time: 5/18/2022 8:55 AM  Performed by: Ismael Goff M.D.  Authorized by: Ismael Goff M.D.     Location:  OR  Urgency:  Elective  Indications for Airway Management:  Anesthesia      Spontaneous Ventilation: absent    Sedation Level:  Deep  Preoxygenated: Yes    Patient Position:  Sniffing  Mask Difficulty Assessment:  0 - not attempted  Final Airway Type:  Endotracheal airway  Final Endotracheal Airway:  NIM tube  Cuffed: Yes    Technique Used for Successful ETT Placement:  Direct laryngoscopy    Insertion Site:  Oral  Blade Type:  Carlene  Laryngoscope Blade/Videolaryngoscope Blade Size:  3  Measured from:  Teeth  ETT to Teeth (cm):  21  Placement Verified by: auscultation and capnometry    Cormack-Lehane Classification:  Grade I - full view of glottis  Number of Attempts at Approach:  1

## 2022-05-18 NOTE — PROGRESS NOTES
Post-Operative Discharge Instructions for Parathyroidectomy     ACTIVITY:   • Most patients are able to return to a full-time work schedule in 1-2 weeks; however this may vary according to your job. It may take longer to return to heavy physical or other demanding work, or shorter if you are feeling well.    • Do NOT drive a car until you are able to turn the neck side to side, which may take 1-2 weeks.     • Do NOT drive while you are taking pain medicines.     DIET:   • You may have temporary throat discomfort or difficulty swallowing. This is due to the surgery around your larynx (voice box) and esophagus (swallowing tube). These symptoms will gradually improve over the course of several weeks.     • Drink and eat foods that can be swallowed easily, e.g. juice, soup, gelatin, applesauce, scrambled eggs or mashed potatoes.     • You may be able to return to your usual diet in a couple of days.     INCISION CARE:   • You may remove the outer dressing 48 hours after surgery.    • You may shower 48 hours after surgery but please do not swim or soak in a tub for at least 2 weeks. After you finish showering, just pat your incision dry. If it is draining clear fluid, you can cover it with a dry dressing (such as gauze). Do NOT scrub with soap or washcloth for the first 10 days.     • Mild swelling at the incision site will go away in 4-6 weeks. The pink line will slowly fade to white during the next 6-12 months.     • Use a sunscreen (SPF#30 or higher) or wear a scarf for protection if in the sun for the first 6 months to a year as the sun can darken your scar.     • You may begin to use a hypoallergenic moisturizing cream (no vitamin E, Mederma, or other “scar” creams) along the incision after 2 weeks.     COMMON PROBLEMS:   • Numbness of the skin under the chin or above the incision is normal and should go away in a few weeks.     • You may feel a lump or pressure in your throat sensation when swallowing for a few  weeks.     • Your incision may feel itchy while it heals. Avoid rubbing or scratching if possible.     • You may feel neck stiffness, tightness, a pulling feeling, mild aching chest discomfort, headache, ear pain or congestion. Take a mild pain medicine such as Tylenol or Advil. Put heat on the area using a hot water bottle, heating pad or warm shower.     • Some people prefer to sleep with an extra pillow for the first few days after the surgery, this helps keep swelling around your incision to a minimum.     • Your voice may be hoarse or weak. Pitch or tone may change. You may have difficulty singing. This usually goes back to normal over 6 weeks to 6 months.     • After surgery, you may notice a change in your mood, emotional ups and downs, depression, irritability or fatigue and weakness. These changes will get better as time passes.     • You do not need to be at bed rest, being active is normally well tolerated within reason.     MEDICATIONS:   • Take 2000 mg of TUMS over-the-counter after your surgery (two tablets). If you begin experiencing symptoms of low calcium such as numbness or tingling in your fingertips or around your mouth, you may increase the amount of TUMS you take up to 10 tablets per day. If symptoms persist on 10 TUMS a day, call my office to inform me.     Please note that it is common for the calcium level to be low following removal of the parathyroid, and you may experience numbness or tingling, which is a sign of low calcium. If this happens, it should improve when you take your calcium supplements. If it does not, please call your doctor.     • Please resume your pre-hospital medications. You should follow-up with your primary care physician regarding new prescriptions and refills.     • We will supply you with a prescription for a mild narcotic pain medication. You are not required to take it. If you do take it, please do not drive or drink alcohol as these in combination may make you  drowsy. Most patients do not need strong pain medicine by the time you leave the hospital. You can take Tylenol (acetaminophen) or ibuprofen (e.g. Advil) as needed.     • If you are taking supplemental calcium or narcotics, you may also want to take a stool softener, such as over-the-counter Colace or Senna, to avoid constipation. Stay hydrated by drinking some extra water.     LABORATORY DRAW:  You have been provided with a prescription to have you calcium levels drawn prior to your follow up visit. The order is at the laboratory that you designated at as your lab of choice at the office. Please have this drawn ~2 days prior to your follow-up visit.    CALL YOUR DOCTOR IF:   • Call your doctor or go to the Emergency Room if you have fever (temperature greater than 100.5), chills, lightheadedness, shortness of breath, difficulty breathing, nausea, vomiting, numbness or tingling in your fingers, hands, or mouth, muscle spasms, or if you notice signs of wound infection (redness, tenderness, or drainage from the incision). Please also call or go to the Emergency Room if you have any other urgent concerns.     FOLLOW-UP:   With Dr. Leonard in one week, call to schedule, [phone].    Babs Leonard M.D.  [facility]    [address] [place] [address] [address]  [place] [id number]

## 2022-05-18 NOTE — ANESTHESIA POSTPROCEDURE EVALUATION
Patient: Marcella Barajas    Procedure Summary     Date: 05/18/22 Room / Location: [facility] ROOM 23 / SURGERY SAME DAY [facility]    Anesthesia Start: 0849 Anesthesia Stop: 0954    Procedure: PARATHYROIDECTOMY (Right Neck) Diagnosis: (PRIMARY HYPERPARATHYROIDISM)    Surgeons: Babs Leonard M.D. Responsible Provider: Ismael Goff M.D.    Anesthesia Type: general ASA Status: 2          Final Anesthesia Type: general  Last vitals  BP   Blood Pressure: 112/60    Temp   36.1 °C (97 °F)    Pulse   65   Resp   16    SpO2   96 %      Anesthesia Post Evaluation    Patient location during evaluation: PACU  Patient participation: complete - patient participated  Level of consciousness: awake and alert    Airway patency: patent  Anesthetic complications: no  Cardiovascular status: hemodynamically stable  Respiratory status: acceptable  Hydration status: euvolemic    PONV: none          No complications documented.     Nurse Pain Score: 0 (NPRS)

## 2022-05-23 ENCOUNTER — HOSPITAL ENCOUNTER (OUTPATIENT)
Dept: LAB | Facility: MEDICAL CENTER | Age: 52
End: 2022-05-23
Attending: SURGERY
Payer: COMMERCIAL

## 2022-05-23 LAB — CALCIUM SERPL-MCNC: 9.5 MG/DL (ref 8.5–10.5)

## 2022-05-23 PROCEDURE — 82310 ASSAY OF CALCIUM: CPT

## 2022-05-23 PROCEDURE — 36415 COLL VENOUS BLD VENIPUNCTURE: CPT

## 2022-10-25 ENCOUNTER — APPOINTMENT (RX ONLY)
Dept: URBAN - METROPOLITAN AREA CLINIC 35 | Facility: CLINIC | Age: 52
Setting detail: DERMATOLOGY
End: 2022-10-25

## 2022-10-25 DIAGNOSIS — D22 MELANOCYTIC NEVI: ICD-10-CM

## 2022-10-25 DIAGNOSIS — L81.4 OTHER MELANIN HYPERPIGMENTATION: ICD-10-CM

## 2022-10-25 DIAGNOSIS — L82.1 OTHER SEBORRHEIC KERATOSIS: ICD-10-CM

## 2022-10-25 DIAGNOSIS — Z71.89 OTHER SPECIFIED COUNSELING: ICD-10-CM

## 2022-10-25 PROBLEM — D23.72 OTHER BENIGN NEOPLASM OF SKIN OF LEFT LOWER LIMB, INCLUDING HIP: Status: ACTIVE | Noted: 2022-10-25

## 2022-10-25 PROBLEM — D22.4 MELANOCYTIC NEVI OF SCALP AND NECK: Status: ACTIVE | Noted: 2022-10-25

## 2022-10-25 PROBLEM — D22.122 MELANOCYTIC NEVI OF LEFT LOWER EYELID, INCLUDING CANTHUS: Status: ACTIVE | Noted: 2022-10-25

## 2022-10-25 PROBLEM — D22.5 MELANOCYTIC NEVI OF TRUNK: Status: ACTIVE | Noted: 2022-10-25

## 2022-10-25 PROCEDURE — ? SUNSCREEN RECOMMENDATIONS

## 2022-10-25 PROCEDURE — 99213 OFFICE O/P EST LOW 20 MIN: CPT

## 2022-10-25 PROCEDURE — ? COUNSELING

## 2022-10-25 PROCEDURE — ? OBSERVATION AND MEASURE

## 2022-10-25 ASSESSMENT — LOCATION ZONE DERM
LOCATION ZONE: LEG
LOCATION ZONE: FACE
LOCATION ZONE: NECK
LOCATION ZONE: TRUNK
LOCATION ZONE: EYELID

## 2022-10-25 ASSESSMENT — LOCATION DETAILED DESCRIPTION DERM
LOCATION DETAILED: RIGHT CENTRAL ANTERIOR NECK
LOCATION DETAILED: RIGHT LATERAL ABDOMEN
LOCATION DETAILED: LEFT CENTRAL MALAR CHEEK
LOCATION DETAILED: SUPERIOR THORACIC SPINE
LOCATION DETAILED: RIGHT ANTERIOR PROXIMAL THIGH
LOCATION DETAILED: RIGHT MID-UPPER BACK
LOCATION DETAILED: RIGHT MEDIAL SUPERIOR CHEST
LOCATION DETAILED: RIGHT INFERIOR CENTRAL MALAR CHEEK
LOCATION DETAILED: LEFT LATERAL INFERIOR EYELID

## 2022-10-25 ASSESSMENT — LOCATION SIMPLE DESCRIPTION DERM
LOCATION SIMPLE: RIGHT CHEEK
LOCATION SIMPLE: RIGHT THIGH
LOCATION SIMPLE: LEFT INFERIOR EYELID
LOCATION SIMPLE: RIGHT UPPER BACK
LOCATION SIMPLE: UPPER BACK
LOCATION SIMPLE: LEFT CHEEK
LOCATION SIMPLE: CHEST
LOCATION SIMPLE: ABDOMEN
LOCATION SIMPLE: NECK

## 2022-10-25 NOTE — HPI: FULL BODY SKIN EXAMINATION
What Type Of Note Output Would You Prefer (Optional)?: Bullet Format
What Is The Reason For Today's Visit?: Full Body Skin Examination
What Is The Reason For Today's Visit? (Being Monitored For X): concerning skin lesions on a periodic basis

## 2022-10-27 ENCOUNTER — OFFICE VISIT (OUTPATIENT)
Dept: MEDICAL GROUP | Facility: MEDICAL CENTER | Age: 52
End: 2022-10-27
Payer: COMMERCIAL

## 2022-10-27 VITALS
OXYGEN SATURATION: 99 % | HEIGHT: 68 IN | TEMPERATURE: 97.8 F | SYSTOLIC BLOOD PRESSURE: 110 MMHG | WEIGHT: 167 LBS | HEART RATE: 82 BPM | BODY MASS INDEX: 25.31 KG/M2 | DIASTOLIC BLOOD PRESSURE: 64 MMHG

## 2022-10-27 DIAGNOSIS — M85.80 OSTEOPENIA, UNSPECIFIED LOCATION: ICD-10-CM

## 2022-10-27 DIAGNOSIS — Z12.83 SKIN CANCER SCREENING: ICD-10-CM

## 2022-10-27 DIAGNOSIS — E66.3 OVERWEIGHT (BMI 25.0-29.9): ICD-10-CM

## 2022-10-27 DIAGNOSIS — M85.89 OSTEOPENIA OF MULTIPLE SITES: ICD-10-CM

## 2022-10-27 DIAGNOSIS — R63.5 WEIGHT GAIN: ICD-10-CM

## 2022-10-27 DIAGNOSIS — E83.52 HYPERCALCEMIA: ICD-10-CM

## 2022-10-27 DIAGNOSIS — M25.561 ACUTE PAIN OF RIGHT KNEE: ICD-10-CM

## 2022-10-27 DIAGNOSIS — Z12.31 ENCOUNTER FOR SCREENING MAMMOGRAM FOR BREAST CANCER: ICD-10-CM

## 2022-10-27 PROCEDURE — 99214 OFFICE O/P EST MOD 30 MIN: CPT | Performed by: INTERNAL MEDICINE

## 2022-10-27 RX ORDER — PHENTERMINE HYDROCHLORIDE 37.5 MG/1
19-37.5 TABLET ORAL
Qty: 30 TABLET | Refills: 0 | Status: SHIPPED | OUTPATIENT
Start: 2022-10-27 | End: 2022-11-26

## 2022-10-27 RX ORDER — PHENTERMINE HYDROCHLORIDE 37.5 MG/1
18.75-37.5 TABLET ORAL
Qty: 30 TABLET | Refills: 0 | Status: SHIPPED | OUTPATIENT
Start: 2022-11-27 | End: 2022-12-27

## 2022-10-27 ASSESSMENT — ENCOUNTER SYMPTOMS
NEUROLOGICAL NEGATIVE: 1
CONSTITUTIONAL NEGATIVE: 1
RESPIRATORY NEGATIVE: 1
EYES NEGATIVE: 1
CARDIOVASCULAR NEGATIVE: 1
PSYCHIATRIC NEGATIVE: 1
GASTROINTESTINAL NEGATIVE: 1

## 2022-10-27 ASSESSMENT — FIBROSIS 4 INDEX: FIB4 SCORE: 1.87

## 2022-10-27 NOTE — ASSESSMENT & PLAN NOTE
This is a new problem, patient hurt her right knee while playing basketball around 3 weeks ago.  The pain is mild to moderate.  No visual deformities, bruising present on physical exam.  Limited range of motion.  She has appointment with Dr. Holley at [facility] orthopedic.  Your insurance requires referral.

## 2022-10-27 NOTE — ASSESSMENT & PLAN NOTE
Likely secondary to hyperparathyroidism.  Calcium and vitamin D supplementation.  Recommend surveillance DEXA scan 1 to 2 years.

## 2022-10-27 NOTE — PROGRESS NOTES
Subjective:     Chief Complaint   Patient presents with    Follow-Up     Referral for derm and ortho     Diagnoses of Skin cancer screening, Acute pain of right knee, Osteopenia, unspecified location, Hypercalcemia, Encounter for screening mammogram for breast cancer, Osteopenia of multiple sites, and Overweight (BMI 25.0-29.9) were pertinent to this visit.    HPI: Marcella is a pleasant 52 y.o. female who presents today for problems listed below.  Problem   Osteopenia of Multiple Sites    DEXA 11/24/2021  FINDINGS:  The lumbar spine has a mean bone mineral density of 0.993 g/cm2, with a T score of -1.6 and a Z score of -1.2.     The proximal right femur has a mean bone mineral density of 0.780 g/cm2, with a T score of -1.8 and a Z score of -1.4.     IMPRESSION:     According to the World Health Organization classification, bone mineral density of this patient is osteopenia with increased risk of fracture in the lumbar spine and osteopenia with increased risk of fracture in the right femur.     10-year Probability of Fracture:  Major Osteoporotic     11.6%  Hip     1.8%  Population      [place] ()     Acute Pain of Right Knee    This is a new problem, patient hurt her knee while playing basketball 3 weeks ago.  The pain is mild to moderate, mostly with ambulation.  She took over-the-counter NSAIDs initially, is not taking anything now.  She has appointment with orthopedic specialist and requesting referral per insurance.     Hypercalcemia    Secondary to hyperparathyroidism, status post parathyroidectomy.  Endo recommends to monitor calcium levels annually and DEXA scan every 1 to 2 years.       Overweight (Bmi 25.0-29.9)    She is taking phentermine not on regular basis.  Does not need any refill today.       Past Medical History:   Diagnosis Date    Anesthesia     nausea    Blood clotting disorder ([facility]) 2019    L lung    Factor 5 Leiden mutation, heterozygous ([facility])     Factor 5 Leiden    PONV (postoperative  "nausea and vomiting)      Current Outpatient Medications Ordered in Epic   Medication Sig Dispense Refill    Calcium-Vitamin D-Vitamin K 650-12.5-40 MG-MCG-MCG Chew Tab       DOCUSATE SODIUM PO       Multiple Vitamin (MULTI-VITAMIN PO) Take 1 Tab by mouth every evening.       No current Epic-ordered facility-administered medications on file.     Health Maintenance: Will be due for mammogram on December 30, 2022, order provided.  Review of Systems   Constitutional: Negative.    HENT: Negative.     Eyes: Negative.    Respiratory: Negative.     Cardiovascular: Negative.    Gastrointestinal: Negative.    Genitourinary: Negative.    Musculoskeletal:  Positive for joint pain (right knee).   Skin: Negative.    Neurological: Negative.    Endo/Heme/Allergies: Negative.    Psychiatric/Behavioral: Negative.       Objective:     Exam:  /64   Pulse 82   Temp 36.6 °C (97.8 °F) (Temporal)   Ht 1.727 m (5' 8\")   Wt 75.8 kg (167 lb)   LMP 01/08/2010   SpO2 99%   BMI 25.39 kg/m²  Body mass index is 25.39 kg/m².    Physical Exam  Constitutional:       General: She is not in acute distress.     Appearance: Normal appearance. She is not toxic-appearing.   HENT:      Head: Normocephalic and atraumatic.      Mouth/Throat:      Mouth: Mucous membranes are moist.      Pharynx: Oropharynx is clear. No oropharyngeal exudate or posterior oropharyngeal erythema.   Eyes:      General: No scleral icterus.  Cardiovascular:      Rate and Rhythm: Normal rate and regular rhythm.      Pulses: Normal pulses.      Heart sounds: Normal heart sounds.   Pulmonary:      Effort: Pulmonary effort is normal.      Breath sounds: Normal breath sounds.   Abdominal:      Tenderness: There is no rebound.   Musculoskeletal:      Right knee: No swelling, deformity, effusion, erythema, ecchymosis or bony tenderness. Decreased range of motion.      Instability Tests: Medial Olga test negative and lateral Olga test negative.   Skin:     General: " Skin is warm and dry.   Neurological:      Mental Status: She is alert and oriented to person, place, and time.      Gait: Gait normal.   Psychiatric:         Mood and Affect: Mood normal.         Behavior: Behavior normal.     Labs: per orders     Assessment & Plan:   Marcella  is a pleasant 52 y.o. female with the following -     Problem List Items Addressed This Visit       Acute pain of right knee     This is a new problem, patient hurt her right knee while playing basketball around 3 weeks ago.  The pain is mild to moderate.  No visual deformities, bruising present on physical exam.  Limited range of motion.  She has appointment with Dr. Holley at [facility] orthopedic.  Your insurance requires referral.          Relevant Orders    Referral to Orthopedics    Hypercalcemia    Relevant Orders    Basic Metabolic Panel    Osteopenia of multiple sites     Likely secondary to hyperparathyroidism.  Calcium and vitamin D supplementation.  Recommend surveillance DEXA scan 1 to 2 years.           Overweight (BMI 25.0-29.9)     This is a chronic problem, weight is stable.  I prescribed her metformin during her last appointment and she has not been taking it regularly.  She exercises regularly, diet is healthy overall.  She is aware that she needs in person appointment for phentermine refills.          Other Visit Diagnoses       Skin cancer screening        Relevant Orders    Referral to Dermatology    Osteopenia, unspecified location        Relevant Orders    DS-BONE DENSITY STUDY (DEXA)    Encounter for screening mammogram for breast cancer        Relevant Orders    MA-SCREENING MAMMO BILAT W/TOMOSYNTHESIS W/CAD          Return if symptoms worsen or fail to improve.    Please note that this dictation was created using voice recognition software. I have made every reasonable attempt to correct obvious errors, but I expect that there are errors of grammar and possibly content that I did not discover before finalizing the  note.

## 2022-10-27 NOTE — ASSESSMENT & PLAN NOTE
This is a chronic problem, weight is stable.  I prescribed her metformin during her last appointment and she has not been taking it regularly.  She exercises regularly, diet is healthy overall.  She is aware that she needs in person appointment for phentermine refills.

## 2022-11-29 DIAGNOSIS — Z12.83 SKIN CANCER SCREENING: ICD-10-CM

## 2022-12-13 ENCOUNTER — OFFICE VISIT (OUTPATIENT)
Dept: MEDICAL GROUP | Facility: MEDICAL CENTER | Age: 52
End: 2022-12-13
Payer: COMMERCIAL

## 2022-12-13 VITALS
WEIGHT: 165.17 LBS | DIASTOLIC BLOOD PRESSURE: 78 MMHG | SYSTOLIC BLOOD PRESSURE: 98 MMHG | HEART RATE: 96 BPM | TEMPERATURE: 97.7 F | BODY MASS INDEX: 25.03 KG/M2 | OXYGEN SATURATION: 97 % | HEIGHT: 68 IN

## 2022-12-13 DIAGNOSIS — M25.562 LEFT KNEE PAIN, UNSPECIFIED CHRONICITY: ICD-10-CM

## 2022-12-13 DIAGNOSIS — M25.561 CHRONIC PAIN OF RIGHT KNEE: ICD-10-CM

## 2022-12-13 DIAGNOSIS — G89.29 CHRONIC PAIN OF RIGHT KNEE: ICD-10-CM

## 2022-12-13 DIAGNOSIS — M25.462 PAIN AND SWELLING OF LEFT KNEE: ICD-10-CM

## 2022-12-13 DIAGNOSIS — M25.562 PAIN AND SWELLING OF LEFT KNEE: ICD-10-CM

## 2022-12-13 PROCEDURE — 99213 OFFICE O/P EST LOW 20 MIN: CPT | Performed by: INTERNAL MEDICINE

## 2022-12-13 ASSESSMENT — FIBROSIS 4 INDEX: FIB4 SCORE: 1.87

## 2022-12-13 ASSESSMENT — ENCOUNTER SYMPTOMS
FEVER: 0
SHORTNESS OF BREATH: 0
CHILLS: 0

## 2022-12-14 NOTE — PROGRESS NOTES
"Subjective:     Chief Complaint   Patient presents with    Knee Pain     Left      HPI: Marcella is a pleasant 52 y.o. female who presents today for evaluation of pain and swelling in the left knee   Problem   Pain and Swelling of Left Knee    This is a new problem, symptoms started  1+ week ago.   Location: left knee   Duration: Constant discomfort, pain worsening with knee flexion  Precipitating trauma: None  Associated symptoms: None  Home treatments: Icing, compression sleeve, Advil.  On physical exam there is left knee effusion present, worsening pain with knee flexion.  No tenderness of the joint line.  No calf tenderness, no lower extremity edema.        Past Medical History:   Diagnosis Date    Anesthesia     nausea    Blood clotting disorder ([facility]) 2019    L lung    Factor 5 Leiden mutation, heterozygous ([facility])     Factor 5 Leiden    PONV (postoperative nausea and vomiting)      Current Outpatient Medications Ordered in Epic   Medication Sig Dispense Refill    phentermine (ADIPEX-P) 37.5 MG tablet Take 0.5-1 Tablets by mouth every morning before breakfast for 30 days. 30 Tablet 0    Calcium-Vitamin D-Vitamin K 650-12.5-40 MG-MCG-MCG Chew Tab       DOCUSATE SODIUM PO       Multiple Vitamin (MULTI-VITAMIN PO) Take 1 Tab by mouth every evening.       No current Epic-ordered facility-administered medications on file.     Review of Systems   Constitutional:  Negative for chills and fever.   Respiratory:  Negative for shortness of breath.    Cardiovascular:  Negative for chest pain.     Objective:     Exam:  BP (!) 98/78 (BP Location: Left arm, Patient Position: Sitting, BP Cuff Size: Adult)   Pulse 96   Temp 36.5 °C (97.7 °F) (Temporal)   Ht 1.727 m (5' 8\")   Wt 74.9 kg (165 lb 2.7 oz)   LMP 01/08/2010   SpO2 97%   BMI 25.11 kg/m²  Body mass index is 25.11 kg/m².    Physical Exam  Constitutional:       General: She is not in acute distress.     Appearance: Normal appearance. She is not toxic-appearing. "   HENT:      Head: Normocephalic and atraumatic.   Abdominal:      Tenderness: There is no rebound.   Musculoskeletal:      Right knee: No swelling, deformity, effusion, erythema, ecchymosis or bony tenderness. Decreased range of motion.      Instability Tests: Medial Olga test negative and lateral Olga test negative.      Left knee: Swelling and effusion present. No erythema, ecchymosis, lacerations or bony tenderness. Decreased range of motion. No tenderness. No MCL laxity or ACL laxity.     Instability Tests: Anterior drawer test negative. Posterior drawer test negative. Medial Olga test negative.      Right lower leg: No edema.      Left lower leg: No edema.      Comments: Negative for calf tenderness, negative Yasmine syndrome.   Skin:     General: Skin is warm and dry.   Neurological:      Mental Status: She is alert and oriented to person, place, and time.      Gait: Gait normal.   Psychiatric:         Behavior: Behavior normal.     Assessment & Plan:   Marcella  is a pleasant 52 y.o. female with the following -     Problem List Items Addressed This Visit           Pain and swelling of left knee     This is a new problem, likely secondary to left knee effusion.  Possible overuse arthritis.  Patient has orthopedic specialist Dr. Holley and she is requesting referral back to him for evaluation.   -Continue wearing compression sleeve, icing, topical NSAIDs   - Oral Tylenol or ibuprofen   - Follow-up with Ortho.    - Return to care if symptoms worsen or fail to improve.           Relevant Orders    Referral to Orthopedics     Return if symptoms worsen or fail to improve.    Please note that this dictation was created using voice recognition software. I have made every reasonable attempt to correct obvious errors, but I expect that there are errors of grammar and possibly content that I did not discover before finalizing the note.

## 2022-12-14 NOTE — ASSESSMENT & PLAN NOTE
This is a new problem, likely secondary to left knee effusion.  Possible overuse arthritis.  Patient has orthopedic specialist Dr. Holley and she is requesting referral back to him for evaluation.   -Continue wearing compression sleeve, icing, topical NSAIDs   - Oral Tylenol or ibuprofen   - Follow-up with Ortho.    - Return to care if symptoms worsen or fail to improve.

## 2023-01-06 ENCOUNTER — APPOINTMENT (RX ONLY)
Dept: URBAN - METROPOLITAN AREA CLINIC 35 | Facility: CLINIC | Age: 53
Setting detail: DERMATOLOGY
End: 2023-01-06

## 2023-01-06 DIAGNOSIS — Z41.9 ENCOUNTER FOR PROCEDURE FOR PURPOSES OTHER THAN REMEDYING HEALTH STATE, UNSPECIFIED: ICD-10-CM

## 2023-01-06 PROCEDURE — ? KTP LASER

## 2023-01-06 PROCEDURE — ? ADDITIONAL NOTES

## 2023-01-06 ASSESSMENT — LOCATION ZONE DERM
LOCATION ZONE: FACE
LOCATION ZONE: HAND

## 2023-01-06 ASSESSMENT — LOCATION SIMPLE DESCRIPTION DERM
LOCATION SIMPLE: LEFT CHEEK
LOCATION SIMPLE: LEFT HAND
LOCATION SIMPLE: RIGHT CHEEK

## 2023-01-06 ASSESSMENT — LOCATION DETAILED DESCRIPTION DERM
LOCATION DETAILED: RIGHT INFERIOR CENTRAL MALAR CHEEK
LOCATION DETAILED: LEFT RADIAL DORSAL HAND
LOCATION DETAILED: LEFT INFERIOR CENTRAL MALAR CHEEK

## 2023-01-06 NOTE — PROCEDURE: KTP LASER
Laser Type: KTP laser 532nm
Repetition Rate (Hz): 1
Detail Level: Simple
Spot Size: 1 mm
Power (Meeks): 2
Fluence (J/Cm2): 24
Pulse Duration (Msec): 10
Post-Procedure Instructions: Ice applied. Post care reviewed with patient.
Immediate Endpoint: immediate vessel disappearance
Treated Area: medium area
Price (Use Numbers Only, No Special Characters Or $): 420
Consent: Written consent obtained, risks reviewed including but not limited to crusting, scabbing, blistering, scarring, darker or lighter pigmentary change, incidental hair removal, bruising, and/or incomplete removal.
Post-Care Instructions: I reviewed with the patient in detail post-care instructions. Patient should stay away from the sun and wear sun protection until treated areas are fully healed.

## 2023-01-06 NOTE — PROCEDURE: ADDITIONAL NOTES
Detail Level: Simple
Additional Notes: Power 2000mW \\nDuration 0.1s\\nInterval 0.3s\\nPulses 699
Render Risk Assessment In Note?: yes

## 2023-01-13 ENCOUNTER — HOSPITAL ENCOUNTER (OUTPATIENT)
Dept: RADIOLOGY | Facility: MEDICAL CENTER | Age: 53
End: 2023-01-13
Attending: INTERNAL MEDICINE
Payer: COMMERCIAL

## 2023-01-13 DIAGNOSIS — Z12.31 ENCOUNTER FOR SCREENING MAMMOGRAM FOR BREAST CANCER: ICD-10-CM

## 2023-01-13 PROCEDURE — 77063 BREAST TOMOSYNTHESIS BI: CPT

## 2023-02-09 ENCOUNTER — OFFICE VISIT (OUTPATIENT)
Dept: MEDICAL GROUP | Facility: MEDICAL CENTER | Age: 53
End: 2023-02-09
Payer: COMMERCIAL

## 2023-02-09 VITALS
HEART RATE: 68 BPM | TEMPERATURE: 97 F | DIASTOLIC BLOOD PRESSURE: 82 MMHG | WEIGHT: 167.22 LBS | HEIGHT: 68 IN | BODY MASS INDEX: 25.34 KG/M2 | SYSTOLIC BLOOD PRESSURE: 116 MMHG | OXYGEN SATURATION: 97 %

## 2023-02-09 DIAGNOSIS — H92.09 OTALGIA, UNSPECIFIED LATERALITY: Primary | ICD-10-CM

## 2023-02-09 DIAGNOSIS — H65.01 NON-RECURRENT ACUTE SEROUS OTITIS MEDIA OF RIGHT EAR: ICD-10-CM

## 2023-02-09 DIAGNOSIS — H66.90 ACUTE OTITIS MEDIA, UNSPECIFIED OTITIS MEDIA TYPE: ICD-10-CM

## 2023-02-09 DIAGNOSIS — J02.9 ACUTE PHARYNGITIS, UNSPECIFIED ETIOLOGY: ICD-10-CM

## 2023-02-09 DIAGNOSIS — R09.81 NASAL CONGESTION: ICD-10-CM

## 2023-02-09 PROBLEM — H83.01: Status: ACTIVE | Noted: 2023-02-09

## 2023-02-09 LAB
INT CON NEG: NEGATIVE
INT CON POS: POSITIVE
S PYO AG THROAT QL: NEGATIVE

## 2023-02-09 PROCEDURE — 87880 STREP A ASSAY W/OPTIC: CPT | Performed by: INTERNAL MEDICINE

## 2023-02-09 PROCEDURE — 99213 OFFICE O/P EST LOW 20 MIN: CPT | Performed by: INTERNAL MEDICINE

## 2023-02-09 RX ORDER — FLUTICASONE PROPIONATE 50 MCG
1 SPRAY, SUSPENSION (ML) NASAL DAILY
Qty: 16 G | Refills: 1 | Status: SHIPPED | OUTPATIENT
Start: 2023-02-09

## 2023-02-09 RX ORDER — AMOXICILLIN AND CLAVULANATE POTASSIUM 875; 125 MG/1; MG/1
1 TABLET, FILM COATED ORAL 2 TIMES DAILY
Qty: 14 TABLET | Refills: 0 | Status: SHIPPED | OUTPATIENT
Start: 2023-02-09 | End: 2023-02-16

## 2023-02-09 ASSESSMENT — FIBROSIS 4 INDEX: FIB4 SCORE: 1.9

## 2023-02-09 ASSESSMENT — PATIENT HEALTH QUESTIONNAIRE - PHQ9: CLINICAL INTERPRETATION OF PHQ2 SCORE: 0

## 2023-02-09 NOTE — ASSESSMENT & PLAN NOTE
Acute problem, likely secondary to bacterial otitis media.  Associated with acute pharyngitis.   - will treat with a course of Augmentin   - DayQuil NyQuil Flonase for nasal congestion   -Use hydration, multivitamins, vitamin C vitamin D.   -Return to care if symptoms worsen or fail to improve

## 2023-02-09 NOTE — PROGRESS NOTES
"Subjective:     Chief Complaint   Patient presents with    Ear Pain     Tuesday      The primary encounter diagnosis was Otalgia, unspecified laterality. Diagnoses of Nasal congestion, Acute pharyngitis, unspecified etiology, Acute otitis media, unspecified otitis media type, and Non-recurrent acute serous otitis media of right ear were also pertinent to this visit.    HPI: Marcella is a pleasant 53 y.o. female who presents today evaluation of    Problem   Non-Recurrent Acute Serous Otitis Media of Right Ear    Onset: 2 days ago   Location: Right ear.   Pain, associated with right-sided cervical lymphadenopathy.   Associated symptoms: no  fever, positive for sore throat.  Home treatments: Over-the-counter cold medicine.     Acute Pharyngitis    Acute problem, symptoms started on Tuesday.  Associated with right-sided otitis externa.           Past Medical History:   Diagnosis Date    Anesthesia     nausea    Blood clotting disorder ([facility]) 2019    L lung    Factor 5 Leiden mutation, heterozygous ([facility])     Factor 5 Leiden    PONV (postoperative nausea and vomiting)      Current Outpatient Medications Ordered in Epic   Medication Sig Dispense Refill    amoxicillin-clavulanate (AUGMENTIN) 875-125 MG Tab Take 1 Tablet by mouth 2 times a day for 7 days. 14 Tablet 0    fluticasone (FLONASE) 50 MCG/ACT nasal spray Administer 1 Spray into affected nostril(S) every day. 16 g 1    Calcium-Vitamin D-Vitamin K 650-12.5-40 MG-MCG-MCG Chew Tab       Multiple Vitamin (MULTI-VITAMIN PO) Take 1 Tab by mouth every evening.       No current Epic-ordered facility-administered medications on file.     ROS  Negative except as above.    Objective:     Exam:  /82 (BP Location: Left arm, Patient Position: Sitting, BP Cuff Size: Adult)   Pulse 68   Temp 36.1 °C (97 °F) (Temporal)   Ht 1.727 m (5' 8\")   Wt 75.8 kg (167 lb 3.5 oz)   LMP 01/08/2010   SpO2 97%   BMI 25.43 kg/m²  Body mass index is 25.43 kg/m².    Physical " Exam  Constitutional:       General: She is not in acute distress.     Appearance: Normal appearance. She is ill-appearing. She is not toxic-appearing.   HENT:      Head: Normocephalic and atraumatic.      Right Ear: Ear canal normal. A middle ear effusion is present. Tympanic membrane is injected.      Left Ear: Hearing, tympanic membrane, ear canal and external ear normal.      Nose: Congestion present.      Mouth/Throat:      Mouth: Mucous membranes are moist.      Tongue: No lesions.      Pharynx: Pharyngeal swelling and posterior oropharyngeal erythema present. No oropharyngeal exudate or uvula swelling.   Neurological:      Mental Status: She is alert.     Assessment & Plan:   Marcella  is a pleasant 53 y.o. female with the following -     Problem List Items Addressed This Visit       Acute pharyngitis      POCT strep, patient will be notified about results on MyChart.           Relevant Orders    POCT Rapid Strep A    Non-recurrent acute serous otitis media of right ear     Acute problem, likely secondary to bacterial otitis media.  Associated with acute pharyngitis.   - will treat with a course of Augmentin   - DayQuil NyQuil Flonase for nasal congestion   -Use hydration, multivitamins, vitamin C vitamin D.   -Return to care if symptoms worsen or fail to improve         Relevant Medications    amoxicillin-clavulanate (AUGMENTIN) 875-125 MG Tab     Other Visit Diagnoses       Otalgia, unspecified laterality    -  Primary    Nasal congestion        Relevant Medications    fluticasone (FLONASE) 50 MCG/ACT nasal spray    Acute otitis media, unspecified otitis media type        Relevant Medications    amoxicillin-clavulanate (AUGMENTIN) 875-125 MG Tab    fluticasone (FLONASE) 50 MCG/ACT nasal spray            Return if symptoms worsen or fail to improve.    Please note that this dictation was created using voice recognition software. I have made every reasonable attempt to correct obvious errors, but I expect that  there are errors of grammar and possibly content that I did not discover before finalizing the note.

## 2023-04-18 ENCOUNTER — HOSPITAL ENCOUNTER (OUTPATIENT)
Dept: RADIOLOGY | Facility: MEDICAL CENTER | Age: 53
End: 2023-04-18
Attending: INTERNAL MEDICINE
Payer: COMMERCIAL

## 2023-04-18 DIAGNOSIS — M85.80 OSTEOPENIA, UNSPECIFIED LOCATION: ICD-10-CM

## 2023-04-18 PROCEDURE — 77080 DXA BONE DENSITY AXIAL: CPT

## 2023-04-25 ENCOUNTER — HOSPITAL ENCOUNTER (OUTPATIENT)
Dept: LAB | Facility: MEDICAL CENTER | Age: 53
End: 2023-04-25
Attending: INTERNAL MEDICINE
Payer: COMMERCIAL

## 2023-04-25 DIAGNOSIS — E83.52 HYPERCALCEMIA: ICD-10-CM

## 2023-04-25 LAB
ANION GAP SERPL CALC-SCNC: 10 MMOL/L (ref 7–16)
BUN SERPL-MCNC: 18 MG/DL (ref 8–22)
CALCIUM SERPL-MCNC: 9.5 MG/DL (ref 8.5–10.5)
CHLORIDE SERPL-SCNC: 105 MMOL/L (ref 96–112)
CO2 SERPL-SCNC: 25 MMOL/L (ref 20–33)
CREAT SERPL-MCNC: 0.73 MG/DL (ref 0.5–1.4)
FASTING STATUS PATIENT QL REPORTED: NORMAL
GFR SERPLBLD CREATININE-BSD FMLA CKD-EPI: 98 ML/MIN/1.73 M 2
GLUCOSE SERPL-MCNC: 90 MG/DL (ref 65–99)
POTASSIUM SERPL-SCNC: 4.4 MMOL/L (ref 3.6–5.5)
SODIUM SERPL-SCNC: 140 MMOL/L (ref 135–145)

## 2023-04-25 PROCEDURE — 80048 BASIC METABOLIC PNL TOTAL CA: CPT

## 2023-04-25 PROCEDURE — 36415 COLL VENOUS BLD VENIPUNCTURE: CPT

## 2023-08-23 ENCOUNTER — HOSPITAL ENCOUNTER (OUTPATIENT)
Dept: LAB | Facility: MEDICAL CENTER | Age: 53
End: 2023-08-23
Attending: NURSE PRACTITIONER
Payer: COMMERCIAL

## 2023-08-23 LAB
25(OH)D3 SERPL-MCNC: 35 NG/ML (ref 30–100)
ALBUMIN SERPL BCP-MCNC: 4.3 G/DL (ref 3.2–4.9)
ALBUMIN/GLOB SERPL: 1.8 G/DL
ALP SERPL-CCNC: 85 U/L (ref 30–99)
ALT SERPL-CCNC: 29 U/L (ref 2–50)
ANION GAP SERPL CALC-SCNC: 10 MMOL/L (ref 7–16)
AST SERPL-CCNC: 34 U/L (ref 12–45)
BASOPHILS # BLD AUTO: 1 % (ref 0–1.8)
BASOPHILS # BLD: 0.04 K/UL (ref 0–0.12)
BILIRUB SERPL-MCNC: 0.5 MG/DL (ref 0.1–1.5)
BUN SERPL-MCNC: 15 MG/DL (ref 8–22)
CALCIUM ALBUM COR SERPL-MCNC: 9.2 MG/DL (ref 8.5–10.5)
CALCIUM SERPL-MCNC: 9.4 MG/DL (ref 8.5–10.5)
CHLORIDE SERPL-SCNC: 104 MMOL/L (ref 96–112)
CHOLEST SERPL-MCNC: 194 MG/DL (ref 100–199)
CO2 SERPL-SCNC: 27 MMOL/L (ref 20–33)
CREAT SERPL-MCNC: 0.77 MG/DL (ref 0.5–1.4)
EOSINOPHIL # BLD AUTO: 0.1 K/UL (ref 0–0.51)
EOSINOPHIL NFR BLD: 2.5 % (ref 0–6.9)
ERYTHROCYTE [DISTWIDTH] IN BLOOD BY AUTOMATED COUNT: 44 FL (ref 35.9–50)
EST. AVERAGE GLUCOSE BLD GHB EST-MCNC: 108 MG/DL
ESTRADIOL SERPL-MCNC: 11.8 PG/ML
FSH SERPL-ACNC: 70.6 MIU/ML
GFR SERPLBLD CREATININE-BSD FMLA CKD-EPI: 92 ML/MIN/1.73 M 2
GLOBULIN SER CALC-MCNC: 2.4 G/DL (ref 1.9–3.5)
GLUCOSE SERPL-MCNC: 102 MG/DL (ref 65–99)
HBA1C MFR BLD: 5.4 % (ref 4–5.6)
HCT VFR BLD AUTO: 43.4 % (ref 37–47)
HDLC SERPL-MCNC: 79 MG/DL
HGB BLD-MCNC: 14.8 G/DL (ref 12–16)
IMM GRANULOCYTES # BLD AUTO: 0.01 K/UL (ref 0–0.11)
IMM GRANULOCYTES NFR BLD AUTO: 0.2 % (ref 0–0.9)
LDLC SERPL CALC-MCNC: 102 MG/DL
LH SERPL-ACNC: 41.7 IU/L
LYMPHOCYTES # BLD AUTO: 1.52 K/UL (ref 1–4.8)
LYMPHOCYTES NFR BLD: 37.8 % (ref 22–41)
MCH RBC QN AUTO: 31.7 PG (ref 27–33)
MCHC RBC AUTO-ENTMCNC: 34.1 G/DL (ref 32.2–35.5)
MCV RBC AUTO: 92.9 FL (ref 81.4–97.8)
MONOCYTES # BLD AUTO: 0.36 K/UL (ref 0–0.85)
MONOCYTES NFR BLD AUTO: 9 % (ref 0–13.4)
NEUTROPHILS # BLD AUTO: 1.99 K/UL (ref 1.82–7.42)
NEUTROPHILS NFR BLD: 49.5 % (ref 44–72)
NRBC # BLD AUTO: 0 K/UL
NRBC BLD-RTO: 0 /100 WBC (ref 0–0.2)
PLATELET # BLD AUTO: 148 K/UL (ref 164–446)
PMV BLD AUTO: 12.5 FL (ref 9–12.9)
POTASSIUM SERPL-SCNC: 4.2 MMOL/L (ref 3.6–5.5)
PROT SERPL-MCNC: 6.7 G/DL (ref 6–8.2)
PSA SERPL-MCNC: <0.02 NG/ML (ref 0–4)
RBC # BLD AUTO: 4.67 M/UL (ref 4.2–5.4)
SODIUM SERPL-SCNC: 141 MMOL/L (ref 135–145)
T3FREE SERPL-MCNC: 3.12 PG/ML (ref 2–4.4)
T4 FREE SERPL-MCNC: 1.35 NG/DL (ref 0.93–1.7)
T4 SERPL-MCNC: 7.3 UG/DL (ref 4–12)
TRIGL SERPL-MCNC: 64 MG/DL (ref 0–149)
TSH SERPL DL<=0.005 MIU/L-ACNC: 1.45 UIU/ML (ref 0.38–5.33)
WBC # BLD AUTO: 4 K/UL (ref 4.8–10.8)

## 2023-08-23 PROCEDURE — 84403 ASSAY OF TOTAL TESTOSTERONE: CPT

## 2023-08-23 PROCEDURE — 83002 ASSAY OF GONADOTROPIN (LH): CPT

## 2023-08-23 PROCEDURE — 80061 LIPID PANEL: CPT

## 2023-08-23 PROCEDURE — 82306 VITAMIN D 25 HYDROXY: CPT

## 2023-08-23 PROCEDURE — 84481 FREE ASSAY (FT-3): CPT

## 2023-08-23 PROCEDURE — 84270 ASSAY OF SEX HORMONE GLOBUL: CPT

## 2023-08-23 PROCEDURE — 83001 ASSAY OF GONADOTROPIN (FSH): CPT

## 2023-08-23 PROCEDURE — 86800 THYROGLOBULIN ANTIBODY: CPT

## 2023-08-23 PROCEDURE — 84153 ASSAY OF PSA TOTAL: CPT

## 2023-08-23 PROCEDURE — 82670 ASSAY OF TOTAL ESTRADIOL: CPT

## 2023-08-23 PROCEDURE — 84439 ASSAY OF FREE THYROXINE: CPT

## 2023-08-23 PROCEDURE — 82172 ASSAY OF APOLIPOPROTEIN: CPT

## 2023-08-23 PROCEDURE — 36415 COLL VENOUS BLD VENIPUNCTURE: CPT

## 2023-08-23 PROCEDURE — 83695 ASSAY OF LIPOPROTEIN(A): CPT

## 2023-08-23 PROCEDURE — 84305 ASSAY OF SOMATOMEDIN: CPT

## 2023-08-23 PROCEDURE — 84402 ASSAY OF FREE TESTOSTERONE: CPT

## 2023-08-23 PROCEDURE — 83036 HEMOGLOBIN GLYCOSYLATED A1C: CPT

## 2023-08-23 PROCEDURE — 80053 COMPREHEN METABOLIC PANEL: CPT

## 2023-08-23 PROCEDURE — 84443 ASSAY THYROID STIM HORMONE: CPT

## 2023-08-23 PROCEDURE — 85025 COMPLETE CBC W/AUTO DIFF WBC: CPT

## 2023-08-24 LAB
IGF-I SERPL-MCNC: 152 NG/ML (ref 52–233)
IGF-I Z-SCORE SERPL: 0.6
LPA SERPL-MCNC: 58 MG/DL
THYROGLOB AB SERPL-ACNC: <0.9 IU/ML (ref 0–4)

## 2023-08-25 LAB — APO B100 SERPL-MCNC: 85 MG/DL (ref 60–117)

## 2023-08-28 LAB
SHBG SERPL-SCNC: 84 NMOL/L (ref 17–125)
TESTOST FREE SERPL-MCNC: 1.4 PG/ML (ref 0.6–3.8)
TESTOST SERPL-MCNC: 16 NG/DL (ref 9–55)

## 2023-10-19 NOTE — TELEPHONE ENCOUNTER
Received request via: Pharmacy    Was the patient seen in the last year in this department? Yes    Does the patient have an active prescription (recently filled or refills available) for medication(s) requested? No    Requested Prescriptions     Pending Prescriptions Disp Refills    phentermine (ADIPEX-P) 37.5 MG tablet 30 Tablet 0     Sig: Take 0.5-1 Tablets by mouth every morning before breakfast for 30 days. From June 28, 2022 to July 28, 2022.             Patient is a 59y old  Female who presents with a chief complaint of Abdominal pain (18 Oct 2023 14:55)      SUBJECTIVE / OVERNIGHT EVENTS:    No events overnight. This AM, patient without n/v/d/cp/sob.  Patient requested to have sister, Idalmis Campos, to translate. Patient states she is having itching all over her body and still complaining of R hip pain    MEDICATIONS  (STANDING):  enoxaparin Injectable 40 milliGRAM(s) SubCutaneous every 24 hours  losartan 100 milliGRAM(s) Oral daily  pantoprazole    Tablet 40 milliGRAM(s) Oral every 12 hours  polyethylene glycol 3350 17 Gram(s) Oral daily  senna 1 Tablet(s) Oral at bedtime    MEDICATIONS  (PRN):  acetaminophen     Tablet .. 650 milliGRAM(s) Oral every 6 hours PRN Temp greater or equal to 38C (100.4F), Mild Pain (1 - 3)  aluminum hydroxide/magnesium hydroxide/simethicone Suspension 30 milliLiter(s) Oral every 4 hours PRN Dyspepsia  melatonin 3 milliGRAM(s) Oral at bedtime PRN Insomnia  ondansetron Injectable 4 milliGRAM(s) IV Push every 8 hours PRN Nausea and/or Vomiting  oxyCODONE    IR 5 milliGRAM(s) Oral every 6 hours PRN Severe Pain (7 - 10)      PHYSICAL EXAM:  T(C): 36.8 (10-19-23 @ 17:34), Max: 37.1 (10-18-23 @ 22:17)  HR: 62 (10-19-23 @ 17:34) (54 - 62)  BP: 153/76 (10-19-23 @ 17:34) (145/77 - 153/76)  RR: 17 (10-19-23 @ 17:34) (17 - 21)  SpO2: 98% (10-19-23 @ 17:34) (98% - 100%)  I&O's Summary    GENERAL: NAD, pt resting in bed   HEAD:  Atraumatic, Normocephalic, MMM  CHEST/LUNG: No use of accessory muscles, CTA B/L, breathing non-labored  COR: RR, no m/r/c/g  ABD: Soft, ND/ NT, +BS  PSYCH: AAOx3  NEUROLOGY: no focal deficits grossly noted, moving all extremities  SKIN: No rashes or lesions  EXT: no LE edema noted B/L     LABS:  CAPILLARY BLOOD GLUCOSE                              12.6   6.86  )-----------( 233      ( 18 Oct 2023 05:44 )             38.6     10-18    140  |  104  |  15  ----------------------------<  104<H>  4.1   |  24  |  1.09    Ca    9.3      18 Oct 2023 05:44  Phos  3.9     10-18  Mg     1.90     10-18    TPro  7.1  /  Alb  3.9  /  TBili  0.3  /  DBili  x   /  AST  20  /  ALT  20  /  AlkPhos  67  10-18          Urinalysis Basic - ( 18 Oct 2023 05:44 )    Color: x / Appearance: x / SG: x / pH: x  Gluc: 104 mg/dL / Ketone: x  / Bili: x / Urobili: x   Blood: x / Protein: x / Nitrite: x   Leuk Esterase: x / RBC: x / WBC x   Sq Epi: x / Non Sq Epi: x / Bacteria: x          RADIOLOGY & ADDITIONAL TESTS:    Telemetry Personally Reviewed -     Imaging Personally Reviewed -     Imaging Reviewed -     Consultant(s) Notes Reviewed -       Care Discussed with Consultants/Other Providers -

## 2023-10-21 ENCOUNTER — PATIENT MESSAGE (OUTPATIENT)
Dept: MEDICAL GROUP | Facility: MEDICAL CENTER | Age: 53
End: 2023-10-21
Payer: COMMERCIAL

## 2023-10-21 DIAGNOSIS — Z12.83 SKIN CANCER SCREENING: ICD-10-CM

## 2023-10-24 ENCOUNTER — APPOINTMENT (RX ONLY)
Dept: URBAN - METROPOLITAN AREA CLINIC 35 | Facility: CLINIC | Age: 53
Setting detail: DERMATOLOGY
End: 2023-10-24

## 2023-10-24 DIAGNOSIS — Z71.89 OTHER SPECIFIED COUNSELING: ICD-10-CM

## 2023-10-24 DIAGNOSIS — D18.0 HEMANGIOMA: ICD-10-CM

## 2023-10-24 DIAGNOSIS — L663 OTHER SPECIFIED DISEASES OF HAIR AND HAIR FOLLICLES: ICD-10-CM

## 2023-10-24 DIAGNOSIS — L73.9 FOLLICULAR DISORDER, UNSPECIFIED: ICD-10-CM

## 2023-10-24 DIAGNOSIS — D22 MELANOCYTIC NEVI: ICD-10-CM

## 2023-10-24 DIAGNOSIS — L81.4 OTHER MELANIN HYPERPIGMENTATION: ICD-10-CM

## 2023-10-24 DIAGNOSIS — L82.1 OTHER SEBORRHEIC KERATOSIS: ICD-10-CM

## 2023-10-24 DIAGNOSIS — D485 NEOPLASM OF UNCERTAIN BEHAVIOR OF SKIN: ICD-10-CM | Status: INADEQUATELY CONTROLLED

## 2023-10-24 DIAGNOSIS — L738 OTHER SPECIFIED DISEASES OF HAIR AND HAIR FOLLICLES: ICD-10-CM

## 2023-10-24 PROBLEM — D22.5 MELANOCYTIC NEVI OF TRUNK: Status: ACTIVE | Noted: 2023-10-24

## 2023-10-24 PROBLEM — L02.32 FURUNCLE OF BUTTOCK: Status: ACTIVE | Noted: 2023-10-24

## 2023-10-24 PROBLEM — D22.122 MELANOCYTIC NEVI OF LEFT LOWER EYELID, INCLUDING CANTHUS: Status: ACTIVE | Noted: 2023-10-24

## 2023-10-24 PROBLEM — D48.5 NEOPLASM OF UNCERTAIN BEHAVIOR OF SKIN: Status: ACTIVE | Noted: 2023-10-24

## 2023-10-24 PROBLEM — D18.01 HEMANGIOMA OF SKIN AND SUBCUTANEOUS TISSUE: Status: ACTIVE | Noted: 2023-10-24

## 2023-10-24 PROBLEM — D23.72 OTHER BENIGN NEOPLASM OF SKIN OF LEFT LOWER LIMB, INCLUDING HIP: Status: ACTIVE | Noted: 2023-10-24

## 2023-10-24 PROCEDURE — ? TREATMENT REGIMEN

## 2023-10-24 PROCEDURE — ? SUNSCREEN RECOMMENDATIONS

## 2023-10-24 PROCEDURE — ? SURGICAL DECISION MAKING

## 2023-10-24 PROCEDURE — ? SEPARATE AND IDENTIFIABLE DOCUMENTATION

## 2023-10-24 PROCEDURE — ? BIOPSY BY SHAVE METHOD

## 2023-10-24 PROCEDURE — ? COUNSELING

## 2023-10-24 PROCEDURE — ? OBSERVATION AND MEASURE

## 2023-10-24 PROCEDURE — 99214 OFFICE O/P EST MOD 30 MIN: CPT | Mod: 25

## 2023-10-24 PROCEDURE — 11102 TANGNTL BX SKIN SINGLE LES: CPT

## 2023-10-24 ASSESSMENT — LOCATION SIMPLE DESCRIPTION DERM
LOCATION SIMPLE: LEFT INFERIOR EYELID
LOCATION SIMPLE: ABDOMEN
LOCATION SIMPLE: LEFT CHEEK
LOCATION SIMPLE: LEFT THIGH
LOCATION SIMPLE: LEFT BUTTOCK
LOCATION SIMPLE: RIGHT BUTTOCK
LOCATION SIMPLE: UPPER BACK
LOCATION SIMPLE: RIGHT UPPER BACK

## 2023-10-24 ASSESSMENT — LOCATION ZONE DERM
LOCATION ZONE: LEG
LOCATION ZONE: EYELID
LOCATION ZONE: FACE
LOCATION ZONE: TRUNK

## 2023-10-24 ASSESSMENT — LOCATION DETAILED DESCRIPTION DERM
LOCATION DETAILED: RIGHT BUTTOCK
LOCATION DETAILED: LEFT ANTERIOR DISTAL THIGH
LOCATION DETAILED: LEFT LATERAL INFERIOR EYELID
LOCATION DETAILED: SUPERIOR THORACIC SPINE
LOCATION DETAILED: LEFT BUTTOCK
LOCATION DETAILED: LEFT CENTRAL MALAR CHEEK
LOCATION DETAILED: RIGHT LATERAL ABDOMEN
LOCATION DETAILED: RIGHT MID-UPPER BACK
LOCATION DETAILED: RIGHT SUPERIOR UPPER BACK

## 2023-10-24 NOTE — PROCEDURE: BIOPSY BY SHAVE METHOD
Detail Level: Detailed
Depth Of Biopsy: dermis
Was A Bandage Applied: Yes
Size Of Lesion In Cm: 0
Biopsy Type: H and E
Biopsy Method: Dermablade
Anesthesia Type: 1% lidocaine without epinephrine
Anesthesia Volume In Cc: 1.1
Hemostasis: Aluminum Chloride
Wound Care: Petrolatum
Dressing: Band-Aid
Destruction After The Procedure: No
Type Of Destruction Used: Curettage
Curettage Text: The wound bed was treated with curettage after the biopsy was performed.
Electrodesiccation And Curettage Text: The wound bed was treated with electrodesiccation and curettage after the biopsy was
Lab: [id number]
Lab Facility: 
Consent: Verbal consent was obtained and risks were reviewed including but not limited to scarring, infection, bleeding, scabbing, incomplete removal, nerve damage and allergy to anesthesia.
Post-Care Instructions: I reviewed with the patient in detail post-care instructions. Patient is to keep the biopsy site dry overnight, and then apply white petrolatum twice daily until healed. Patient may apply hydrogen peroxide soaks to remove any crusting.
Notification Instructions: Patient will be notified of biopsy results. However, patient instructed to call the office if not contacted within 2 weeks.
Billing Type: Third-Party Bill
Information: Selecting Yes will display possible errors in your note based on the variables you have selected. This validation is only offered as a suggestion for you. PLEASE NOTE THAT THE VALIDATION TEXT WILL BE REMOVED WHEN YOU FINALIZE YOUR NOTE. IF YOU WANT TO FAX A PRELIMINARY NOTE YOU WILL NEED TO TOGGLE THIS TO 'NO' IF YOU DO NOT WANT IT IN YOUR FAXED NOTE.

## 2023-10-24 NOTE — PROCEDURE: SURGICAL DECISION MAKING
Discussion: We discussed not only the risks of the procedure but also the likely granger that further treatment will be required to treat lesion. This could involve another visit here to destroy or excise  lesion, a visit to a Mohs or general or plastic surgeon, scarring, limited  activity, cosmetic imperfections.
Identified Risk Factors Documented?: yes
Date Of Surgery - Today Or Tomorrow?: today
Risk Assessment Explanation (Does Not Render In The Note): Clinical determination of the probability and/or consequences of an event, such as surgery. Clinical assessment of the level of risk is affected by the nature of the event under consideration for the patient. Modifier 57 is used to indicate an Evaluation and Management (E/M) service resulted in the initial decision to perform surgery either the day before a major surgery (90 day global) or the day of a major surgery.
Complexity (Necessary For Coding; Major - 90 Day Global With Some Exceptions; Minor - 10 Day Global): minor

## 2023-10-24 NOTE — PROCEDURE: OBSERVATION
Detail Level: Detailed
Size Of Lesion: 3mm flesh toned papule
Size Of Lesion: 1mm dark brown macule
Size Of Lesion: 9mm linear tan macule

## 2023-12-22 ENCOUNTER — HOSPITAL ENCOUNTER (OUTPATIENT)
Dept: LAB | Facility: MEDICAL CENTER | Age: 53
End: 2023-12-22
Attending: NURSE PRACTITIONER
Payer: COMMERCIAL

## 2023-12-22 LAB
ESTRADIOL SERPL-MCNC: 34.4 PG/ML
PROGEST SERPL-MCNC: 1.1 NG/ML
T3FREE SERPL-MCNC: 2.75 PG/ML (ref 2–4.4)
T4 FREE SERPL-MCNC: 1.31 NG/DL (ref 0.93–1.7)
T4 SERPL-MCNC: 6.9 UG/DL (ref 4–12)
TSH SERPL DL<=0.005 MIU/L-ACNC: 1.37 UIU/ML (ref 0.38–5.33)

## 2023-12-22 PROCEDURE — 36415 COLL VENOUS BLD VENIPUNCTURE: CPT

## 2023-12-22 PROCEDURE — 84144 ASSAY OF PROGESTERONE: CPT

## 2023-12-22 PROCEDURE — 84439 ASSAY OF FREE THYROXINE: CPT

## 2023-12-22 PROCEDURE — 84443 ASSAY THYROID STIM HORMONE: CPT

## 2023-12-22 PROCEDURE — 84403 ASSAY OF TOTAL TESTOSTERONE: CPT

## 2023-12-22 PROCEDURE — 84270 ASSAY OF SEX HORMONE GLOBUL: CPT

## 2023-12-22 PROCEDURE — 84481 FREE ASSAY (FT-3): CPT

## 2023-12-22 PROCEDURE — 84402 ASSAY OF FREE TESTOSTERONE: CPT

## 2023-12-22 PROCEDURE — 82670 ASSAY OF TOTAL ESTRADIOL: CPT

## 2023-12-26 LAB
SHBG SERPL-SCNC: 101 NMOL/L (ref 17–125)
TESTOST FREE SERPL-MCNC: 1.2 PG/ML (ref 0.6–3.8)
TESTOST SERPL-MCNC: 16 NG/DL (ref 9–55)

## 2024-05-08 ENCOUNTER — HOSPITAL ENCOUNTER (OUTPATIENT)
Dept: LAB | Facility: MEDICAL CENTER | Age: 54
End: 2024-05-08
Attending: NURSE PRACTITIONER
Payer: COMMERCIAL

## 2024-05-08 LAB
25(OH)D3 SERPL-MCNC: 55 NG/ML (ref 30–100)
ALBUMIN SERPL BCP-MCNC: 4.3 G/DL (ref 3.2–4.9)
ALBUMIN/GLOB SERPL: 2 G/DL
ALP SERPL-CCNC: 70 U/L (ref 30–99)
ALT SERPL-CCNC: 19 U/L (ref 2–50)
ANION GAP SERPL CALC-SCNC: 11 MMOL/L (ref 7–16)
AST SERPL-CCNC: 30 U/L (ref 12–45)
BASOPHILS # BLD AUTO: 0.7 % (ref 0–1.8)
BASOPHILS # BLD: 0.03 K/UL (ref 0–0.12)
BILIRUB SERPL-MCNC: 0.4 MG/DL (ref 0.1–1.5)
BUN SERPL-MCNC: 16 MG/DL (ref 8–22)
CALCIUM ALBUM COR SERPL-MCNC: 9 MG/DL (ref 8.5–10.5)
CALCIUM SERPL-MCNC: 9.2 MG/DL (ref 8.5–10.5)
CHLORIDE SERPL-SCNC: 105 MMOL/L (ref 96–112)
CHOLEST SERPL-MCNC: 172 MG/DL (ref 100–199)
CO2 SERPL-SCNC: 23 MMOL/L (ref 20–33)
CREAT SERPL-MCNC: 0.75 MG/DL (ref 0.5–1.4)
EOSINOPHIL # BLD AUTO: 0.06 K/UL (ref 0–0.51)
EOSINOPHIL NFR BLD: 1.4 % (ref 0–6.9)
ERYTHROCYTE [DISTWIDTH] IN BLOOD BY AUTOMATED COUNT: 44.2 FL (ref 35.9–50)
EST. AVERAGE GLUCOSE BLD GHB EST-MCNC: 94 MG/DL
ESTRADIOL SERPL-MCNC: 50.7 PG/ML
FASTING STATUS PATIENT QL REPORTED: NORMAL
FSH SERPL-ACNC: 41.4 MIU/ML
GFR SERPLBLD CREATININE-BSD FMLA CKD-EPI: 94 ML/MIN/1.73 M 2
GLOBULIN SER CALC-MCNC: 2.2 G/DL (ref 1.9–3.5)
GLUCOSE SERPL-MCNC: 88 MG/DL (ref 65–99)
HBA1C MFR BLD: 4.9 % (ref 4–5.6)
HCT VFR BLD AUTO: 42 % (ref 37–47)
HDLC SERPL-MCNC: 74 MG/DL
HGB BLD-MCNC: 14.5 G/DL (ref 12–16)
IMM GRANULOCYTES # BLD AUTO: 0.01 K/UL (ref 0–0.11)
IMM GRANULOCYTES NFR BLD AUTO: 0.2 % (ref 0–0.9)
LDLC SERPL CALC-MCNC: 86 MG/DL
LH SERPL-ACNC: 29.6 IU/L
LYMPHOCYTES # BLD AUTO: 1.52 K/UL (ref 1–4.8)
LYMPHOCYTES NFR BLD: 34.9 % (ref 22–41)
MCH RBC QN AUTO: 32.2 PG (ref 27–33)
MCHC RBC AUTO-ENTMCNC: 34.5 G/DL (ref 32.2–35.5)
MCV RBC AUTO: 93.3 FL (ref 81.4–97.8)
MONOCYTES # BLD AUTO: 0.42 K/UL (ref 0–0.85)
MONOCYTES NFR BLD AUTO: 9.6 % (ref 0–13.4)
NEUTROPHILS # BLD AUTO: 2.32 K/UL (ref 1.82–7.42)
NEUTROPHILS NFR BLD: 53.2 % (ref 44–72)
NRBC # BLD AUTO: 0 K/UL
NRBC BLD-RTO: 0 /100 WBC (ref 0–0.2)
PLATELET # BLD AUTO: 144 K/UL (ref 164–446)
PMV BLD AUTO: 12.9 FL (ref 9–12.9)
POTASSIUM SERPL-SCNC: 4.1 MMOL/L (ref 3.6–5.5)
PROGEST SERPL-MCNC: 2.15 NG/ML
PROT SERPL-MCNC: 6.5 G/DL (ref 6–8.2)
RBC # BLD AUTO: 4.5 M/UL (ref 4.2–5.4)
SODIUM SERPL-SCNC: 139 MMOL/L (ref 135–145)
T3FREE SERPL-MCNC: 2.8 PG/ML (ref 2–4.4)
T4 FREE SERPL-MCNC: 1.28 NG/DL (ref 0.93–1.7)
T4 SERPL-MCNC: 7.4 UG/DL (ref 4–12)
TRIGL SERPL-MCNC: 59 MG/DL (ref 0–149)
TSH SERPL DL<=0.005 MIU/L-ACNC: 1.15 UIU/ML (ref 0.38–5.33)
WBC # BLD AUTO: 4.4 K/UL (ref 4.8–10.8)

## 2024-05-10 LAB
IGF-I SERPL-MCNC: 105 NG/ML (ref 51–233)
IGF-I Z-SCORE SERPL: -0.3
THYROGLOB AB SERPL-ACNC: <0.9 IU/ML (ref 0–4)
THYROPEROXIDASE AB SERPL-ACNC: 1.1 IU/ML (ref 0–9)

## 2024-05-12 LAB — T3REVERSE SERPL-MCNC: 15 NG/DL (ref 9–27)

## 2024-05-14 LAB
SHBG SERPL-SCNC: 108 NMOL/L (ref 17–125)
TESTOST FREE SERPL-MCNC: 1 PG/ML (ref 0.6–3.8)
TESTOST SERPL-MCNC: 14 NG/DL (ref 9–55)

## 2024-08-13 ENCOUNTER — PATIENT MESSAGE (OUTPATIENT)
Dept: HEALTH INFORMATION MANAGEMENT | Facility: OTHER | Age: 54
End: 2024-08-13

## 2024-08-14 NOTE — ASSESSMENT & PLAN NOTE
Chronic, stable. Pt with hx of provoked PE only. As such, she only maintains on anticoagulation following surgery. Follow up with PCP per routine for continued monitoring and management.

## 2024-08-14 NOTE — ASSESSMENT & PLAN NOTE
Chronic, ongoing. Last DEXA 2023 with osteopenia of the lumbar spine and proximal left femur with T scores of -1.2 and -1.4 respectively. Denies hx of fragility fracture. Advise calcium and vitamin D supplementation with weightbearing exercises as tolerated. Follow up with PCP at least annually for continued monitoring and management.

## 2024-08-15 PROBLEM — E83.52 HYPERCALCEMIA: Status: RESOLVED | Noted: 2022-10-27 | Resolved: 2024-08-15

## 2024-08-15 PROBLEM — M25.462 PAIN AND SWELLING OF LEFT KNEE: Status: RESOLVED | Noted: 2022-12-13 | Resolved: 2024-08-15

## 2024-08-15 PROBLEM — D69.6 THROMBOCYTOPENIA (HCC): Status: ACTIVE | Noted: 2024-08-15

## 2024-08-15 PROBLEM — M25.562 PAIN AND SWELLING OF LEFT KNEE: Status: RESOLVED | Noted: 2022-12-13 | Resolved: 2024-08-15

## 2024-08-15 PROBLEM — J02.9 ACUTE PHARYNGITIS: Status: RESOLVED | Noted: 2023-02-09 | Resolved: 2024-08-15

## 2024-08-15 ASSESSMENT — PATIENT HEALTH QUESTIONNAIRE - PHQ9
1. LITTLE INTEREST OR PLEASURE IN DOING THINGS: NOT AT ALL
2. FEELING DOWN, DEPRESSED, IRRITABLE, OR HOPELESS: NOT AT ALL

## 2024-08-15 ASSESSMENT — ACTIVITIES OF DAILY LIVING (ADL): BATHING_REQUIRES_ASSISTANCE: 0

## 2024-08-15 ASSESSMENT — ENCOUNTER SYMPTOMS: GENERAL WELL-BEING: GOOD

## 2024-08-15 NOTE — ASSESSMENT & PLAN NOTE
Chronic, stable with plts varying in the 140s-150s. Etiology unknown. Denies excessive bleeding/bruising. Continue to monitor. Follow up with PCP at least annually for continued monitoring and management.   Latest Reference Range & Units 11/27/19 03:49 05/11/22 12:44 08/23/23 08:14 05/08/24 08:09   Platelet Count 164 - 446 K/uL 153 (L) 147 (L) 148 (L) 144 (L)   (L): Data is abnormally low

## 2024-08-16 ENCOUNTER — OFFICE VISIT (OUTPATIENT)
Dept: FAMILY PLANNING/WOMEN'S HEALTH CLINIC | Facility: PHYSICIAN GROUP | Age: 54
End: 2024-08-16
Attending: FAMILY MEDICINE
Payer: COMMERCIAL

## 2024-08-16 VITALS
HEIGHT: 68 IN | DIASTOLIC BLOOD PRESSURE: 72 MMHG | BODY MASS INDEX: 25.61 KG/M2 | WEIGHT: 169 LBS | SYSTOLIC BLOOD PRESSURE: 110 MMHG

## 2024-08-16 DIAGNOSIS — D68.51 FACTOR 5 LEIDEN MUTATION, HETEROZYGOUS (HCC): ICD-10-CM

## 2024-08-16 DIAGNOSIS — M85.89 OSTEOPENIA OF MULTIPLE SITES: ICD-10-CM

## 2024-08-16 DIAGNOSIS — D69.6 THROMBOCYTOPENIA (HCC): ICD-10-CM

## 2024-08-16 PROCEDURE — 3074F SYST BP LT 130 MM HG: CPT | Performed by: PHYSICIAN ASSISTANT

## 2024-08-16 PROCEDURE — G0438 PPPS, INITIAL VISIT: HCPCS | Performed by: PHYSICIAN ASSISTANT

## 2024-08-16 PROCEDURE — 1126F AMNT PAIN NOTED NONE PRSNT: CPT | Performed by: PHYSICIAN ASSISTANT

## 2024-08-16 PROCEDURE — 3078F DIAST BP <80 MM HG: CPT | Performed by: PHYSICIAN ASSISTANT

## 2024-08-16 RX ORDER — ESTRADIOL 1 MG/G
1 GEL TOPICAL DAILY
COMMUNITY

## 2024-08-16 RX ORDER — PROGESTERONE 100 MG/1
100 CAPSULE ORAL DAILY
COMMUNITY

## 2024-08-16 SDOH — ECONOMIC STABILITY: INCOME INSECURITY: HOW HARD IS IT FOR YOU TO PAY FOR THE VERY BASICS LIKE FOOD, HOUSING, MEDICAL CARE, AND HEATING?: NOT HARD AT ALL

## 2024-08-16 SDOH — ECONOMIC STABILITY: HOUSING INSECURITY: AT ANY TIME IN THE PAST 12 MONTHS, WERE YOU HOMELESS OR LIVING IN A SHELTER (INCLUDING NOW)?: NO

## 2024-08-16 SDOH — ECONOMIC STABILITY: FOOD INSECURITY: WITHIN THE PAST 12 MONTHS, THE FOOD YOU BOUGHT JUST DIDN'T LAST AND YOU DIDN'T HAVE MONEY TO GET MORE.: NEVER TRUE

## 2024-08-16 SDOH — ECONOMIC STABILITY: INCOME INSECURITY: IN THE LAST 12 MONTHS, WAS THERE A TIME WHEN YOU WERE NOT ABLE TO PAY THE MORTGAGE OR RENT ON TIME?: NO

## 2024-08-16 SDOH — HEALTH STABILITY: PHYSICAL HEALTH: ON AVERAGE, HOW MANY DAYS PER WEEK DO YOU ENGAGE IN MODERATE TO STRENUOUS EXERCISE (LIKE A BRISK WALK)?: 5 DAYS

## 2024-08-16 SDOH — ECONOMIC STABILITY: FOOD INSECURITY: WITHIN THE PAST 12 MONTHS, YOU WORRIED THAT YOUR FOOD WOULD RUN OUT BEFORE YOU GOT MONEY TO BUY MORE.: NEVER TRUE

## 2024-08-16 SDOH — HEALTH STABILITY: PHYSICAL HEALTH: ON AVERAGE, HOW MANY MINUTES DO YOU ENGAGE IN EXERCISE AT THIS LEVEL?: 60 MIN

## 2024-08-16 SDOH — ECONOMIC STABILITY: TRANSPORTATION INSECURITY
IN THE PAST 12 MONTHS, HAS THE LACK OF TRANSPORTATION KEPT YOU FROM MEDICAL APPOINTMENTS OR FROM GETTING MEDICATIONS?: NO

## 2024-08-16 SDOH — ECONOMIC STABILITY: TRANSPORTATION INSECURITY
IN THE PAST 12 MONTHS, HAS LACK OF TRANSPORTATION KEPT YOU FROM MEETINGS, WORK, OR FROM GETTING THINGS NEEDED FOR DAILY LIVING?: NO

## 2024-08-16 ASSESSMENT — FIBROSIS 4 INDEX: FIB4 SCORE: 2.58

## 2024-08-16 ASSESSMENT — PATIENT HEALTH QUESTIONNAIRE - PHQ9: CLINICAL INTERPRETATION OF PHQ2 SCORE: 0

## 2024-08-16 ASSESSMENT — PAIN SCALES - GENERAL: PAINLEVEL: NO PAIN

## 2024-08-16 NOTE — PROGRESS NOTES
Comprehensive Health Assessment Program     Marcella Barajas is a 54 y.o. here for her comprehensive health assessment.    Patient Active Problem List    Diagnosis Date Noted    Thrombocytopenia ([facility]) 08/15/2024    Non-recurrent acute serous otitis media of right ear 02/09/2023    Osteopenia of multiple sites 10/27/2022    Chronic pain of right knee 10/27/2022    Overweight (BMI 25.0-29.9) 04/27/2022    Closed bimalleolar fracture of left ankle with routine healing 10/20/2021    History of pulmonary embolus (PE) 11/26/2019    Chronic constipation 06/20/2016    Vitamin D insufficiency 02/06/2015    Factor 5 Leiden mutation, heterozygous ([facility]) 02/06/2015       Current Outpatient Medications   Medication Sig Dispense Refill    fluticasone (FLONASE) 50 MCG/ACT nasal spray Administer 1 Spray into affected nostril(S) every day. 16 g 1    Calcium-Vitamin D-Vitamin K 650-12.5-40 MG-MCG-MCG Chew Tab       Multiple Vitamin (MULTI-VITAMIN PO) Take 1 Tab by mouth every evening.       No current facility-administered medications for this visit.          Current supplements as per medication list.     Allergies:   Patient has no known allergies.  Social History     Tobacco Use    Smoking status: Never    Smokeless tobacco: Never   Vaping Use    Vaping status: Never Used   Substance Use Topics    Alcohol use: Yes     Alcohol/week: 4.8 oz     Types: 8 Glasses of wine per week     Comment: 3-4 days a week    Drug use: No     Family History   Problem Relation Age of Onset    Hypertension Mother     Hypertension Father     Pulmonary Embolism Brother     No Known Problems Brother     No Known Problems Son     No Known Problems Son     No Known Problems Son      Marcella  has a past medical history of Anesthesia, Blood clotting disorder (Former[facility]) (2019), Factor 5 Leiden mutation, heterozygous ([facility]), Hypercalcemia (10/27/2022), and PONV (postoperative nausea and vomiting).   Past Surgical History:   Procedure Laterality Date  "   SC EXPLORE PARATHYROID GLANDS Right 5/18/2022    Procedure: PARATHYROIDECTOMY;  Surgeon: Babs Leonard M.D.;  Location: SURGERY SAME DAY [facility];  Service: General    ORIF, ANKLE Left 9/21/2021    Procedure: ORIF, ANKLE - MEDIAL MEALLEOLOUS;  Surgeon: Jesús Holley M.D.;  Location: SURGERY [facility];  Service: Orthopedics    PLASTIC SURGERY  11/2019    \"Tummy Tuck\"    VAGINAL HYSTERECTOMY SCOPE TOTAL  1/26/2010    Performed by STEVEN LIAO at SURGERY SAME DAY [facility] ORS    CYSTOSCOPY  1/26/2010    Performed by STEVEN LIAO at SURGERY SAME DAY [facility] ORS    LAPAROSCOPY      OTHER      lasik    TONSILLECTOMY         Screening:  In the last six months have you experienced any leakage of urine? No    Depression Screening  Little interest or pleasure in doing things?  0 - not at all  Feeling down, depressed , or hopeless? 0 - not at all  Patient Health Questionnaire Score: 0     If depressive symptoms identified deferred to follow up visit unless specifically addressed in assessment and plan.    Interpretation of PHQ-9 Total Score   Score Severity   1-4 No Depression   5-9 Mild Depression   10-14 Moderate Depression   15-19 Moderately Severe Depression   20-27 Severe Depression    Screening for Cognitive Impairment  Do you or any of your friends or family members have any concern about your memory? No  Three Minute Recall (Leader, Season, Table)  /3    Shin clock face with all 12 numbers and set the hands to show 10 minutes after 11.       Cognitive concerns identified deferred for follow up unless specifically addressed in assessment and plan.    Fall Risk Assessment  Has the patient had two or more falls in the last year or any fall with injury in the last year?  No    Safety Assessment  Do you always wear your seatbelt?  Yes  Any changes to home needed to function safely? No  Difficulty hearing.  No  Patient counseled about all safety risks that were identified.    Functional " Assessment ADLs  Are there any barriers preventing you from cooking for yourself or meeting nutritional needs?  No.    Are there any barriers preventing you from driving safely or obtaining transportation?  No.    Are there any barriers preventing you from using a telephone or calling for help?  No    Are there any barriers preventing you from shopping?  No.    Are there any barriers preventing you from taking care of your own finances?  No    Are there any barriers preventing you from managing your medications?  No    Are there any barriers preventing you from showering, bathing or dressing yourself? No    Are there any barriers preventing you from doing housework or laundry? No  Are there any barriers preventing you from using the toilet?No  Are you currently engaging in any exercise or physical activity?  Yes.      Self-Assessment of Health  What is your perception of your health? Good    Do you sleep more than six hours a night? Yes    In the past 7 days, how much did pain keep you from doing your normal work? None    Do you spend quality time with family or friends (virtually or in person)? Yes    Do you usually eat a heart healthy diet that constists of a variety of fruits, vegetables, whole grains and fiber? Yes    Do you eat foods high in fat and/or Fast Food more than three times per week? No    How concerned are you that your medical conditions are not being well managed? a little    Are you worried that in the next 2 months, you may not have stable housing that you own, rent, or stay in as part of a household? No      Advance Care Planning  Do you have an Advance Directive, Living Will, Durable Power of , or POLST? Yes                 Health Maintenance Summary            Overdue - HIV Screening (Once) Never done      No completion history exists for this topic.              Overdue - Hepatitis C Screening (Once) Never done      No completion history exists for this topic.              Overdue -  COVID-19 Vaccine (1 - 2023-24 season) Never done      No completion history exists for this topic.              Overdue - Hepatitis B Vaccine (Hep B) (2 of 2 - CpG 2-dose series) Overdue since 11/24/2023      10/27/2023  Outside Immunization: Hep B, Adjuvanted              Influenza Vaccine (1) Next due on 9/1/2024      10/27/2023  Outside Immunization: Influenza Quad Inj P    10/13/2023  Patient Reported Immunization: Influenza, Unspecified - HISTORICAL DATA    10/20/2021  Imm Admin: Influenza Vaccine Quad Inj (Pf)    11/06/2020  Imm Admin: Influenza Vac Subunit Quad Inj (Pf)    11/02/2020  Imm Admin: Influenza, Unspecified - HISTORICAL DATA    Only the first 5 history entries have been loaded, but more history exists.              Mammogram (Every 2 Years) Next due on 1/13/2025 01/13/2023  MA-SCREENING MAMMO BILAT W/TOMOSYNTHESIS W/CAD    12/30/2021  MA-SCREENING MAMMO BILAT W/TOMOSYNTHESIS W/O CAD    09/18/2020  MA-SCREENING MAMMO BILAT W/TOMOSYNTHESIS W/CAD    06/19/2019  MA-SCREENING MAMMO BILAT W/TOMOSYNTHESIS W/CAD    11/29/2017  MA-SCREEN MAMMO W/CAD-BILAT    Only the first 5 history entries have been loaded, but more history exists.              IMM DTaP/Tdap/Td Vaccine (2 - Td or Tdap) Next due on 2/6/2025 02/06/2015  Imm Admin: Tdap Vaccine              Colorectal Cancer Screening (Colonoscopy - Every 10 Years) Tentatively due on 12/2/2030 12/02/2020  REFERRAL TO GI FOR COLONOSCOPY    11/01/2020  Colonoscopy (Done)              Zoster (Shingles) Vaccines (Series Information) Completed      02/26/2021  Imm Admin: Zoster Vaccine Recombinant (RZV) (SHINGRIX)    11/06/2020  Imm Admin: Zoster Vaccine Recombinant (RZV) (SHINGRIX)              Hepatitis A Vaccine (Hep A) (Series Information) Aged Out      No completion history exists for this topic.              HPV Vaccines (Series Information) Aged Out      No completion history exists for this topic.              Polio Vaccine (Inactivated  "Polio) (Series Information) Aged Out      No completion history exists for this topic.              Meningococcal Immunization (Series Information) Aged Out      No completion history exists for this topic.              Pneumococcal Vaccine: 0-64 Years (Series Information) Aged Out      No completion history exists for this topic.                    Patient Care Team:  Myla Xiao M.D. as PCP - General (Internal Medicine)      Financial Resource Strain: Low Risk  (8/16/2024)    Overall Financial Resource Strain (CARDIA)     Difficulty of Paying Living Expenses: Not hard at all      Transportation Needs: No Transportation Needs (8/16/2024)    PRAPARE - Transportation     Lack of Transportation (Medical): No     Lack of Transportation (Non-Medical): No      Food Insecurity: No Food Insecurity (8/16/2024)    Hunger Vital Sign     Worried About Running Out of Food in the Last Year: Never true     Ran Out of Food in the Last Year: Never true        Encounter Vitals  Blood Pressure: 110/72  Weight: 76.7 kg (169 lb)  Height: 172.7 cm (5' 8\")  BMI (Calculated): 25.7  Pain Score: No pain     Alert, oriented in no acute distress.  Eye contact is good, speech goal directed, affect calm.    Assessment and Plan. The following treatment and monitoring plan is recommended:  Factor 5 Leiden mutation, heterozygous (HCC)  Chronic, stable. Pt with hx of provoked PE only. As such, she only maintains on anticoagulation following surgery. Follow up with PCP per routine for continued monitoring and management.    Osteopenia of multiple sites  Chronic, ongoing. Last DEXA 2023 with osteopenia of the lumbar spine and proximal left femur with T scores of -1.2 and -1.4 respectively. Denies hx of fragility fracture. Advise calcium and vitamin D supplementation with weightbearing exercises as tolerated. Follow up with PCP at least annually for continued monitoring and management.    Thrombocytopenia (HCC)  Chronic, stable with plts " varying in the 140s-150s. Etiology unknown. Denies excessive bleeding/bruising. Continue to monitor. Follow up with PCP at least annually for continued monitoring and management.   Latest Reference Range & Units 11/27/19 03:49 05/11/22 12:44 08/23/23 08:14 05/08/24 08:09   Platelet Count 164 - 446 K/uL 153 (L) 147 (L) 148 (L) 144 (L)   (L): Data is abnormally low    Services suggested: No services needed at this time  Health Care Screening: Age-appropriate preventive services recommended by USPTF and ACIP covered by Medicare were discussed today. Services ordered if indicated and agreed upon by the patient.  Referrals offered: Community-based lifestyle interventions to reduce health risks and promote self-management and wellness, fall prevention, nutrition, physical activity, tobacco-use cessation, weight loss, and mental health services as per orders if indicated.    Discussion today about general wellness and lifestyle habits:    Prevent falls and reduce trip hazards; Cautioned about securing or removing rugs.  Have a working fire alarm and carbon monoxide detector.  Engage in regular physical activity and social activities.    Follow-up: Return for follow up visit with PCP as previously scheduled.

## 2024-12-04 ENCOUNTER — OFFICE VISIT (OUTPATIENT)
Dept: MEDICAL GROUP | Age: 54
End: 2024-12-04
Payer: COMMERCIAL

## 2024-12-04 VITALS
HEART RATE: 68 BPM | SYSTOLIC BLOOD PRESSURE: 110 MMHG | BODY MASS INDEX: 25.31 KG/M2 | DIASTOLIC BLOOD PRESSURE: 64 MMHG | TEMPERATURE: 98.8 F | WEIGHT: 167 LBS | OXYGEN SATURATION: 99 % | HEIGHT: 68 IN | RESPIRATION RATE: 20 BRPM

## 2024-12-04 DIAGNOSIS — H91.93 BILATERAL HEARING LOSS, UNSPECIFIED HEARING LOSS TYPE: ICD-10-CM

## 2024-12-04 DIAGNOSIS — E78.5 DYSLIPIDEMIA: ICD-10-CM

## 2024-12-04 DIAGNOSIS — M21.961 FOOT DEFORMITY, BILATERAL: ICD-10-CM

## 2024-12-04 DIAGNOSIS — M21.962 FOOT DEFORMITY, BILATERAL: ICD-10-CM

## 2024-12-04 DIAGNOSIS — Z12.83 SKIN CANCER SCREENING: ICD-10-CM

## 2024-12-04 DIAGNOSIS — Z12.31 ENCOUNTER FOR SCREENING MAMMOGRAM FOR BREAST CANCER: ICD-10-CM

## 2024-12-04 DIAGNOSIS — D69.6 THROMBOCYTOPENIA (HCC): ICD-10-CM

## 2024-12-04 PROCEDURE — 99214 OFFICE O/P EST MOD 30 MIN: CPT | Performed by: INTERNAL MEDICINE

## 2024-12-04 PROCEDURE — 3074F SYST BP LT 130 MM HG: CPT | Performed by: INTERNAL MEDICINE

## 2024-12-04 PROCEDURE — 3078F DIAST BP <80 MM HG: CPT | Performed by: INTERNAL MEDICINE

## 2024-12-04 ASSESSMENT — FIBROSIS 4 INDEX: FIB4 SCORE: 2.58

## 2024-12-04 NOTE — PROGRESS NOTES
CC:   Chief Complaint   Patient presents with    Referral Needed     Dermatology, Podiatry     Diagnoses of Bilateral hearing loss, unspecified hearing loss type, Skin cancer screening, Foot deformity, bilateral, Thrombocytopenia (HCC), Dyslipidemia, and Encounter for screening mammogram for breast cancer were pertinent to this visit.  Verbal consent was acquired by the patient to use [employer] ambient listening note generation during this visit Yes     History of Present Illness  Marcella is a pleasant 54 y.o. female who presents today to discuss the following problems:     She is currently under the care of Dr. Christine [facility]e [facility], with annual check-ups. She has no history of skin cancer.    She requires a referral to a podiatrist due to persistent lower back issues. A friend from the athletic training department at [facility] suggested that her right orthotic might be slightly low and recommended a consultation with a podiatrist. She used to see Dr. Martell for this issue.    She received her influenza vaccine at [facility] and was informed that she was due for hepatitis A and B vaccines, of which she has completed 2 out of 3 doses. She was also advised to get a pneumonia vaccine.    She began hormone replacement therapy with estrogen and progesterone about a year ago, which has been beneficial, but she incurs a charge each time she refills her prescription. She has been seeing [facility]. However, she requested only the essential tests due to cost considerations.    She undergoes mammograms annually, typically in January, and usually visits [facility] for this procedure.    She also wishes to have her hearing evaluated. Her last hearing test was conducted 8 years ago. She reports difficulty hearing her , who has a deep voice and speaks softly. This issue has been ongoing for some time.    Past Medical History:   Diagnosis Date    Anesthesia     nausea    Blood clotting disorder (HCC) 2019    L lung  "   Factor 5 Leiden mutation, heterozygous (HCC)     Factor 5 Leiden    Hypercalcemia 10/27/2022    Secondary to hyperparathyroidism, status post parathyroidectomy.  [facility] recommends to monitor calcium levels annually and DEXA scan every 1 to 2 years.         PONV (postoperative nausea and vomiting)      Current Outpatient Medications Ordered in Epic   Medication Sig Dispense Refill    Estradiol 1 MG/GM Gel Place 1 mg/mL on the skin every day.      progesterone (PROMETRIUM) 100 MG Cap Take 100 mg by mouth every day.      Calcium-Vitamin D-Vitamin K 650-12.5-40 MG-MCG-MCG Chew Tab       Multiple Vitamin (MULTI-VITAMIN PO) Take 1 Tab by mouth every evening.       No current Epic-ordered facility-administered medications on file.     Review of Systems   All other systems reviewed and are negative.    Objective:     Exam:  /64 (BP Location: Right arm, Patient Position: Sitting, BP Cuff Size: Adult)   Pulse 68   Temp 37.1 °C (98.8 °F) (Temporal)   Resp 20   Ht 1.727 m (5' 8\")   Wt 75.8 kg (167 lb)   LMP 01/08/2010   SpO2 99%   BMI 25.39 kg/m²  Body mass index is 25.39 kg/m².    Physical Exam  Constitutional:       Appearance: Normal appearance.   HENT:      Head: Normocephalic and atraumatic.   Pulmonary:      Effort: Pulmonary effort is normal. No respiratory distress.      Breath sounds: Normal breath sounds.   Neurological:      Mental Status: She is alert and oriented to person, place, and time.   Psychiatric:         Mood and Affect: Mood normal.         Thought Content: Thought content normal.         Judgment: Judgment normal.       Assessment & Plan:   Marcella  is a pleasant 54 y.o. female with the following -   Assessment & Plan  1. Hearing loss.  She reports difficulty hearing her 's low voice, which has been ongoing for quite a while. A referral to audiology will be made for further evaluation.    2. Lower back pain.  The pain is likely due to a foot deformity. She was advised by an athletic "  to check her orthotics with a podiatrist. A referral to podiatry will be made for further management.    3. Hormone replacement therapy.  She is currently on estrogen and progesterone therapy, which she finds beneficial. She will continue to manage this with her current hormone provider.    4. Thrombocytopenia.  She has a history thrombocytopenia, stable platelet count.       5. Health Maintenance.  She has received her flu shot and started the hepatitis A and B vaccine series, with two out of three doses completed. A pneumonia shot was also started.  A mammogram will be ordered as she missed her annual screening in January 2024. A referral to dermatology will be made for skin cancer screening.    Problem List Items Addressed This Visit       Thrombocytopenia (HCC)    Relevant Orders    CBC WITH DIFFERENTIAL     Other Visit Diagnoses       Bilateral hearing loss, unspecified hearing loss type        Relevant Orders    Referral to Audiology    Skin cancer screening        Relevant Orders    Referral to Dermatology    Foot deformity, bilateral        Relevant Orders    Referral to Podiatry    Dyslipidemia        Relevant Orders    Lipid Profile    Encounter for screening mammogram for breast cancer        Relevant Orders    MA-SCREENING MAMMO BILAT W/TOMOSYNTHESIS W/CAD          No follow-ups on file. As needed     Please note that this dictation was created using voice recognition software. I have made every reasonable attempt to correct obvious errors, but I expect that there are errors of grammar and possibly content that I did not discover before finalizing the note.

## 2024-12-09 ENCOUNTER — PATIENT MESSAGE (OUTPATIENT)
Dept: MEDICAL GROUP | Age: 54
End: 2024-12-09
Payer: COMMERCIAL

## 2024-12-09 DIAGNOSIS — Z12.83 SKIN CANCER SCREENING: ICD-10-CM

## 2024-12-13 ENCOUNTER — HOSPITAL ENCOUNTER (OUTPATIENT)
Dept: LAB | Facility: MEDICAL CENTER | Age: 54
End: 2024-12-13
Attending: NURSE PRACTITIONER
Payer: COMMERCIAL

## 2024-12-13 PROCEDURE — 36415 COLL VENOUS BLD VENIPUNCTURE: CPT

## 2024-12-13 PROCEDURE — 84443 ASSAY THYROID STIM HORMONE: CPT

## 2024-12-13 PROCEDURE — 84482 T3 REVERSE: CPT

## 2024-12-13 PROCEDURE — 84481 FREE ASSAY (FT-3): CPT

## 2024-12-13 PROCEDURE — 82670 ASSAY OF TOTAL ESTRADIOL: CPT

## 2024-12-13 PROCEDURE — 84439 ASSAY OF FREE THYROXINE: CPT

## 2024-12-13 PROCEDURE — 84144 ASSAY OF PROGESTERONE: CPT

## 2024-12-14 LAB
ESTRADIOL SERPL-MCNC: 35.3 PG/ML
PROGEST SERPL-MCNC: 2.14 NG/ML
T3FREE SERPL-MCNC: 2.95 PG/ML (ref 2–4.4)
T4 FREE SERPL-MCNC: 1.27 NG/DL (ref 0.93–1.7)
TSH SERPL-ACNC: 1.19 UIU/ML (ref 0.35–5.5)

## 2024-12-18 LAB — T3REVERSE SERPL-MCNC: 14.7 NG/DL (ref 9–27)

## 2024-12-19 ENCOUNTER — HOSPITAL ENCOUNTER (OUTPATIENT)
Dept: RADIOLOGY | Facility: MEDICAL CENTER | Age: 54
End: 2024-12-19
Attending: INTERNAL MEDICINE
Payer: COMMERCIAL

## 2024-12-19 DIAGNOSIS — Z12.31 ENCOUNTER FOR SCREENING MAMMOGRAM FOR BREAST CANCER: ICD-10-CM

## 2024-12-19 PROCEDURE — 77067 SCR MAMMO BI INCL CAD: CPT

## 2025-01-06 ENCOUNTER — HOSPITAL ENCOUNTER (OUTPATIENT)
Dept: RADIOLOGY | Facility: MEDICAL CENTER | Age: 55
End: 2025-01-06
Attending: INTERNAL MEDICINE
Payer: COMMERCIAL

## 2025-01-06 DIAGNOSIS — R92.8 ABNORMAL MAMMOGRAM: ICD-10-CM

## 2025-01-06 PROCEDURE — G0279 TOMOSYNTHESIS, MAMMO: HCPCS | Mod: 26,RT

## 2025-01-31 ENCOUNTER — OFFICE VISIT (OUTPATIENT)
Dept: URGENT CARE | Facility: CLINIC | Age: 55
End: 2025-01-31
Payer: COMMERCIAL

## 2025-01-31 VITALS
HEIGHT: 68 IN | SYSTOLIC BLOOD PRESSURE: 130 MMHG | HEART RATE: 77 BPM | WEIGHT: 168 LBS | DIASTOLIC BLOOD PRESSURE: 72 MMHG | RESPIRATION RATE: 16 BRPM | TEMPERATURE: 99 F | OXYGEN SATURATION: 98 % | BODY MASS INDEX: 25.46 KG/M2

## 2025-01-31 DIAGNOSIS — J02.9 SORE THROAT: ICD-10-CM

## 2025-01-31 DIAGNOSIS — J10.1 INFLUENZA A: ICD-10-CM

## 2025-01-31 DIAGNOSIS — R68.89 FLU-LIKE SYMPTOMS: ICD-10-CM

## 2025-01-31 LAB
FLUAV RNA SPEC QL NAA+PROBE: POSITIVE
FLUBV RNA SPEC QL NAA+PROBE: NEGATIVE
RSV RNA SPEC QL NAA+PROBE: NEGATIVE
S PYO DNA SPEC NAA+PROBE: NOT DETECTED
SARS-COV-2 RNA RESP QL NAA+PROBE: NEGATIVE

## 2025-01-31 RX ORDER — OSELTAMIVIR PHOSPHATE 75 MG/1
75 CAPSULE ORAL 2 TIMES DAILY
Qty: 10 CAPSULE | Refills: 0 | Status: SHIPPED | OUTPATIENT
Start: 2025-01-31

## 2025-01-31 ASSESSMENT — ENCOUNTER SYMPTOMS
SORE THROAT: 1
GASTROINTESTINAL NEGATIVE: 1
FEVER: 1
EYES NEGATIVE: 1
CARDIOVASCULAR NEGATIVE: 1
COUGH: 1

## 2025-01-31 ASSESSMENT — FIBROSIS 4 INDEX: FIB4 SCORE: 2.63

## 2025-01-31 NOTE — PROGRESS NOTES
"Subjective:   Marcella Barajas is a 55 y.o. female who presents for Otalgia (X 24 HRS L side ear pain)      Otalgia   Associated symptoms include coughing and a sore throat.       Review of Systems   Constitutional:  Positive for fever and malaise/fatigue.   HENT:  Positive for congestion, ear pain and sore throat.    Eyes: Negative.    Respiratory:  Positive for cough.    Cardiovascular: Negative.    Gastrointestinal: Negative.        Medications, Allergies, and current problem list reviewed today in Epic.     Objective:     /72 (BP Location: Right arm, Patient Position: Sitting, BP Cuff Size: Large adult)   Pulse 77   Temp 37.2 °C (99 °F)   Resp 16   Ht 1.727 m (5' 8\")   Wt 76.2 kg (168 lb)   SpO2 98%     Physical Exam  Vitals and nursing note reviewed.   Constitutional:       Appearance: Normal appearance.   HENT:      Head: Normocephalic and atraumatic.      Right Ear: Tympanic membrane normal.      Left Ear: Tympanic membrane normal.      Nose: Congestion present.      Mouth/Throat:      Pharynx: Posterior oropharyngeal erythema present.   Cardiovascular:      Rate and Rhythm: Normal rate and regular rhythm.      Pulses: Normal pulses.      Heart sounds: Normal heart sounds.   Pulmonary:      Breath sounds: Rhonchi present.   Abdominal:      General: Abdomen is flat. Bowel sounds are normal.      Palpations: Abdomen is soft.   Musculoskeletal:      Cervical back: Tenderness present.   Lymphadenopathy:      Cervical: Cervical adenopathy present.   Neurological:      Mental Status: She is alert.         Assessment/Plan:     Diagnosis and associated orders:     1. Flu-like symptoms  POCT CEPHEID COV-2, FLU A/B, RSV - PCR      2. Sore throat  POCT CEPHEID GROUP A STREP - PCR      3. Influenza A  oseltamivir (TAMIFLU) 75 MG Cap         Comments/MDM:              Differential diagnosis, natural history, supportive care, and indications for immediate follow-up discussed.    Advised the patient " to follow-up with the primary care physician for recheck, reevaluation, and consideration of further management.    Please note that this dictation was created using voice recognition software. I have made a reasonable attempt to correct obvious errors, but I expect that there are errors of grammar and possibly content that I did not discover before finalizing the note.    This note was electronically signed by Simon Brennan M.D.

## 2025-02-11 ENCOUNTER — APPOINTMENT (OUTPATIENT)
Dept: URBAN - METROPOLITAN AREA CLINIC 35 | Facility: CLINIC | Age: 55
Setting detail: DERMATOLOGY
End: 2025-02-11

## 2025-02-11 DIAGNOSIS — D22 MELANOCYTIC NEVI: ICD-10-CM

## 2025-02-11 DIAGNOSIS — Z71.89 OTHER SPECIFIED COUNSELING: ICD-10-CM

## 2025-02-11 DIAGNOSIS — D18.0 HEMANGIOMA: ICD-10-CM

## 2025-02-11 DIAGNOSIS — L82.1 OTHER SEBORRHEIC KERATOSIS: ICD-10-CM

## 2025-02-11 DIAGNOSIS — L81.4 OTHER MELANIN HYPERPIGMENTATION: ICD-10-CM

## 2025-02-11 PROBLEM — D22.5 MELANOCYTIC NEVI OF TRUNK: Status: ACTIVE | Noted: 2025-02-11

## 2025-02-11 PROBLEM — D18.01 HEMANGIOMA OF SKIN AND SUBCUTANEOUS TISSUE: Status: ACTIVE | Noted: 2025-02-11

## 2025-02-11 PROBLEM — D23.72 OTHER BENIGN NEOPLASM OF SKIN OF LEFT LOWER LIMB, INCLUDING HIP: Status: ACTIVE | Noted: 2025-02-11

## 2025-02-11 PROBLEM — D22.122 MELANOCYTIC NEVI OF LEFT LOWER EYELID, INCLUDING CANTHUS: Status: ACTIVE | Noted: 2025-02-11

## 2025-02-11 PROCEDURE — ? OBSERVATION

## 2025-02-11 PROCEDURE — ? COUNSELING

## 2025-02-11 PROCEDURE — 99213 OFFICE O/P EST LOW 20 MIN: CPT

## 2025-02-11 PROCEDURE — ? SUNSCREEN RECOMMENDATIONS

## 2025-02-11 ASSESSMENT — LOCATION SIMPLE DESCRIPTION DERM
LOCATION SIMPLE: LEFT CHEEK
LOCATION SIMPLE: LEFT INFERIOR EYELID
LOCATION SIMPLE: RIGHT UPPER BACK
LOCATION SIMPLE: ABDOMEN
LOCATION SIMPLE: UPPER BACK
LOCATION SIMPLE: RIGHT BUTTOCK

## 2025-02-11 ASSESSMENT — LOCATION DETAILED DESCRIPTION DERM
LOCATION DETAILED: LEFT CENTRAL MALAR CHEEK
LOCATION DETAILED: RIGHT SUPERIOR UPPER BACK
LOCATION DETAILED: RIGHT MID-UPPER BACK
LOCATION DETAILED: RIGHT LATERAL ABDOMEN
LOCATION DETAILED: LEFT LATERAL INFERIOR EYELID
LOCATION DETAILED: SUPERIOR THORACIC SPINE
LOCATION DETAILED: RIGHT BUTTOCK

## 2025-02-11 ASSESSMENT — LOCATION ZONE DERM
LOCATION ZONE: FACE
LOCATION ZONE: TRUNK
LOCATION ZONE: EYELID

## 2025-07-21 ASSESSMENT — ACTIVITIES OF DAILY LIVING (ADL): BATHING_REQUIRES_ASSISTANCE: 0

## 2025-07-21 ASSESSMENT — ENCOUNTER SYMPTOMS: GENERAL WELL-BEING: EXCELLENT

## 2025-07-23 ENCOUNTER — OFFICE VISIT (OUTPATIENT)
Dept: FAMILY PLANNING/WOMEN'S HEALTH CLINIC | Facility: PHYSICIAN GROUP | Age: 55
End: 2025-07-23
Attending: FAMILY MEDICINE
Payer: COMMERCIAL

## 2025-07-23 VITALS
BODY MASS INDEX: 25.46 KG/M2 | HEIGHT: 68 IN | SYSTOLIC BLOOD PRESSURE: 112 MMHG | HEART RATE: 82 BPM | OXYGEN SATURATION: 95 % | WEIGHT: 168 LBS | DIASTOLIC BLOOD PRESSURE: 68 MMHG

## 2025-07-23 DIAGNOSIS — D68.51 FACTOR 5 LEIDEN MUTATION, HETEROZYGOUS (HCC): Primary | ICD-10-CM

## 2025-07-23 DIAGNOSIS — D69.6 THROMBOCYTOPENIA (HCC): ICD-10-CM

## 2025-07-23 DIAGNOSIS — M85.89 OSTEOPENIA OF MULTIPLE SITES: ICD-10-CM

## 2025-07-23 PROCEDURE — 1126F AMNT PAIN NOTED NONE PRSNT: CPT | Performed by: PHYSICIAN ASSISTANT

## 2025-07-23 PROCEDURE — 3078F DIAST BP <80 MM HG: CPT | Performed by: PHYSICIAN ASSISTANT

## 2025-07-23 PROCEDURE — G0439 PPPS, SUBSEQ VISIT: HCPCS | Performed by: PHYSICIAN ASSISTANT

## 2025-07-23 PROCEDURE — 3074F SYST BP LT 130 MM HG: CPT | Performed by: PHYSICIAN ASSISTANT

## 2025-07-23 SDOH — ECONOMIC STABILITY: FOOD INSECURITY: WITHIN THE PAST 12 MONTHS, THE FOOD YOU BOUGHT JUST DIDN'T LAST AND YOU DIDN'T HAVE MONEY TO GET MORE.: NEVER TRUE

## 2025-07-23 SDOH — SOCIAL STABILITY: SOCIAL INSECURITY
WITHIN THE LAST YEAR, HAVE YOU BEEN HUMILIATED OR EMOTIONALLY ABUSED IN OTHER WAYS BY YOUR PARTNER OR EX-PARTNER?: PATIENT DECLINED

## 2025-07-23 SDOH — ECONOMIC STABILITY: FOOD INSECURITY: WITHIN THE PAST 12 MONTHS, YOU WORRIED THAT YOUR FOOD WOULD RUN OUT BEFORE YOU GOT THE MONEY TO BUY MORE.: NEVER TRUE

## 2025-07-23 SDOH — SOCIAL STABILITY: SOCIAL INSECURITY
WITHIN THE LAST YEAR, HAVE YOU BEEN KICKED, HIT, SLAPPED, OR OTHERWISE PHYSICALLY HURT BY YOUR PARTNER OR EX-PARTNER?: PATIENT DECLINED

## 2025-07-23 SDOH — ECONOMIC STABILITY: FOOD INSECURITY: HOW HARD IS IT FOR YOU TO PAY FOR THE VERY BASICS LIKE FOOD, HOUSING, MEDICAL CARE, AND HEATING?: NOT HARD AT ALL

## 2025-07-23 SDOH — ECONOMIC STABILITY: TRANSPORTATION INSECURITY: IN THE PAST 12 MONTHS, HAS LACK OF TRANSPORTATION KEPT YOU FROM MEDICAL APPOINTMENTS OR FROM GETTING MEDICATIONS?: NO

## 2025-07-23 SDOH — SOCIAL STABILITY: SOCIAL INSECURITY
WITHIN THE LAST YEAR, HAVE YOU BEEN RAPED OR FORCED TO HAVE ANY KIND OF SEXUAL ACTIVITY BY YOUR PARTNER OR EX-PARTNER?: PATIENT DECLINED

## 2025-07-23 SDOH — SOCIAL STABILITY: SOCIAL INSECURITY: WITHIN THE LAST YEAR, HAVE YOU BEEN AFRAID OF YOUR PARTNER OR EX-PARTNER?: PATIENT DECLINED

## 2025-07-23 ASSESSMENT — FIBROSIS 4 INDEX: FIB4 SCORE: 2.63

## 2025-07-23 ASSESSMENT — ACTIVITIES OF DAILY LIVING (ADL): LACK_OF_TRANSPORTATION: NO

## 2025-07-23 ASSESSMENT — PAIN SCALES - GENERAL: PAINLEVEL_OUTOF10: NO PAIN

## 2025-07-23 ASSESSMENT — PATIENT HEALTH QUESTIONNAIRE - PHQ9: CLINICAL INTERPRETATION OF PHQ2 SCORE: 0

## 2025-07-23 NOTE — PROGRESS NOTES
Comprehensive Health Assessment Program     Marcella Barajas is a 55 y.o. here for her comprehensive health assessment.    Patient Active Problem List    Diagnosis Date Noted    Thrombocytopenia (HCC) 08/15/2024    Non-recurrent acute serous otitis media of right ear 02/09/2023    Osteopenia of multiple sites 10/27/2022    Chronic pain of right knee 10/27/2022    Overweight (BMI 25.0-29.9) 04/27/2022    Closed bimalleolar fracture of left ankle with routine healing 10/20/2021    History of pulmonary embolus (PE) 11/26/2019    Chronic constipation 06/20/2016    Vitamin D insufficiency 02/06/2015    Factor 5 Leiden mutation, heterozygous (HCC) 02/06/2015       Current Medications[1]       Current supplements as per medication list.     Allergies:   Patient has no known allergies.  Social History[2]  Family History   Problem Relation Age of Onset    Hypertension Mother     Hypertension Father     Pulmonary Embolism Brother     No Known Problems Brother     No Known Problems Son     No Known Problems Son     No Known Problems Son      Marcella  has a past medical history of Anesthesia, Blood clotting disorder ([facility]) (2019), Factor 5 Leiden mutation, heterozygous ([facility]), Hypercalcemia (10/27/2022), and PONV (postoperative nausea and vomiting).   Past Surgical History[3]    Screening:  In the last six months have you experienced any leakage of urine? No    Depression Screening  Little interest or pleasure in doing things?  0 - not at all  Feeling down, depressed , or hopeless? 0 - not at all  Patient Health Questionnaire Score: 0     If depressive symptoms identified deferred to follow up visit unless specifically addressed in assessment and plan.    Interpretation of PHQ-9 Total Score   Score Severity   1-4 No Depression   5-9 Mild Depression   10-14 Moderate Depression   15-19 Moderately Severe Depression   20-27 Severe Depression    Screening for Cognitive Impairment  Do you or any of your friends or  family members have any concern about your memory? No    Fall Risk Assessment  Has the patient had two or more falls in the last year or any fall with injury in the last year?  No    Safety Assessment  Do you always wear your seatbelt?  Yes  Any changes to home needed to function safely? No  Difficulty hearing.  No  Patient counseled about all safety risks that were identified.    Functional Assessment ADLs  Are there any barriers preventing you from cooking for yourself or meeting nutritional needs?  No.    Are there any barriers preventing you from driving safely or obtaining transportation?  No.    Are there any barriers preventing you from using a telephone or calling for help?  No    Are there any barriers preventing you from shopping?  No.    Are there any barriers preventing you from taking care of your own finances?  No    Are there any barriers preventing you from managing your medications?  No    Are there any barriers preventing you from showering, bathing or dressing yourself? No    Are there any barriers preventing you from doing housework or laundry? No  Are there any barriers preventing you from using the toilet?No  Are you currently engaging in any exercise or physical activity?  Yes. Volleyball  Golfing  Pickleball      Self-Assessment of Health  What is your perception of your health? Excellent    Do you sleep more than six hours a night? Yes    In the past 7 days, how much did pain keep you from doing your normal work? None    Do you spend quality time with family or friends (virtually or in person)? Yes    Do you usually eat a heart healthy diet that constists of a variety of fruits, vegetables, whole grains and fiber? Yes    Do you eat foods high in fat and/or Fast Food more than three times per week? No    How concerned are you that your medical conditions are not being well managed? a little    Are you worried that in the next 2 months, you may not have stable housing that you own, rent, or  stay in as part of a household? No      Advance Care Planning  Do you have an Advance Directive, Living Will, Durable Power of , or POLST? Yes  Advance Directive       is not on file - instructed patient to bring in a copy to scan into their chart      Health Maintenance Summary            Current Care Gaps       Hepatitis C Screening (Once) Never done     No completion history exists for this topic.              COVID-19 Vaccine (1 - 2024-25 season) Never done     No completion history exists for this topic.                      Needs Review       Mammogram (Yearly) Tentatively due on 1/6/2026 01/06/2025  MA-DIAGNOSTIC MAMMO RIGHT W/TOMOSYNTHESIS W/O CAD    12/19/2024  MA-SCREENING MAMMO BILAT W/TOMOSYNTHESIS W/CAD    01/13/2023  MA-SCREENING MAMMO BILAT W/TOMOSYNTHESIS W/CAD    12/30/2021  MA-SCREENING MAMMO BILAT W/TOMOSYNTHESIS W/O CAD    09/18/2020  MA-SCREENING MAMMO BILAT W/TOMOSYNTHESIS W/CAD     Only the first 5 history entries have been loaded, but more history exists.            Colorectal Cancer Screening (Colonoscopy - Every 10 Years) Tentatively due on 12/2/2030 12/02/2020  REFERRAL TO GI FOR COLONOSCOPY    11/01/2020  Colonoscopy (Done)                      Upcoming       Influenza Vaccine (1) Next due on 9/1/2025      10/03/2024  Imm Admin: Influenza Vaccine-Flucelvax - HISTORICAL DATA    10/27/2023  Imm Admin: Influenza Vaccine Quad Inj (Pf)    10/13/2023  Imm Admin: Influenza, unspecified formulation    10/20/2021  Imm Admin: Influenza Vaccine Quad Inj (Pf)    11/06/2020  Imm Admin: Influenza Vac Subunit Quad Inj (Pf)      Only the first 5 history entries have been loaded, but more history exists.              IMM DTaP/Tdap/Td Vaccine (3 - Td or Tdap) Next due on 4/14/2035 04/14/2025  Outside Immunization: Tdap    02/06/2015  Imm Admin: Tdap Vaccine                      Completed or No Longer Recommended       Hepatitis B Vaccine (Hep B) (Series Information) Completed       04/14/2025  Outside Immunization: Hep A-Hep B    11/13/2024  Imm Admin: Hep A/HEP B Combined Vaccine (TwinRix)    10/03/2024  Imm Admin: Hep A/HEP B Combined Vaccine (TwinRix)    10/27/2023  Imm Admin: Hepatitis B Vaccine, CpG Adjuvanted (Heplisav-B)              Zoster (Shingles) Vaccines (Series Information) Completed      02/26/2021  Imm Admin: Zoster Vaccine Recombinant (RZV) (SHINGRIX)    11/06/2020  Imm Admin: Zoster Vaccine Recombinant (RZV) (SHINGRIX)              Pneumococcal Vaccine: 50+ Years (Series Information) Completed      11/13/2024  Imm Admin: Pneumococcal Conj Pcv21, Polysaccharide [id number] Conj PF              Hepatitis A Vaccine (Hep A) (Series Information) Aged Out      04/14/2025  Outside Immunization: Hep A-Hep B    11/13/2024  Imm Admin: Hep A/HEP B Combined Vaccine (TwinRix)    10/03/2024  Imm Admin: Hep A/HEP B Combined Vaccine (TwinRix)              HPV Vaccines (Series Information) Aged Out      No completion history exists for this topic.              Polio Vaccine (Inactivated Polio) (Series Information) Aged Out     No completion history exists for this topic.              Meningococcal Immunization (Series Information) Aged Out     No completion history exists for this topic.              Meningococcal B Vaccine (Series Information) Aged Out     No completion history exists for this topic.                            Patient Care Team:  Myla Xiao M.D. as PCP - General (Internal Medicine)      Financial Resource Strain: Low Risk  (7/23/2025)    Overall Financial Resource Strain (CARDIA)     Difficulty of Paying Living Expenses: Not hard at all      Transportation Needs: No Transportation Needs (7/23/2025)    PRAPARE - Transportation     Lack of Transportation (Medical): No     Lack of Transportation (Non-Medical): No      Food Insecurity: No Food Insecurity (7/23/2025)    Hunger Vital Sign     Worried About Running Out of Food in the Last Year: Never true     Ran Out of  "Food in the Last Year: Never true     Intimate Partner Violence: Patient Declined (7/23/2025)    Humiliation, Afraid, Rape, and Kick questionnaire     Fear of Current or Ex-Partner: Patient declined     Emotionally Abused: Patient declined     Physically Abused: Patient declined     Sexually Abused: Patient declined         Encounter Vitals  Blood Pressure: 112/68  Pulse: 82  Pulse Oximetry: 95 %  Weight: 76.2 kg (168 lb)  Height: 172.7 cm (5' 8\")  BMI (Calculated): 25.54  Pain Score: No pain     Physical Exam:  Constitutional: NAD  HENMT: NC/AT, EOMI,   Cardiovascular: RRR, No m/r/g  Lungs: CTAB, no w/r/r  Extremities:  No c/c/e  Skin: No lesions notes  Neurologic: Alert & oriented x3, CN II-XII grossly intact    Assessment and Plan. The following treatment and monitoring plan is recommended:  Factor 5 Leiden mutation, heterozygous (HCC)  Chronic, stable. Pt with hx of provoked PE only. As such, she only maintains on anticoagulation following surgery. Follow up with PCP per routine for continued monitoring and management.    Osteopenia of multiple sites  Chronic, ongoing. Last DEXA 2023 with osteopenia of the lumbar spine and proximal left femur with T scores of -1.2 and -1.4 respectively. Denies hx of fragility fracture. Advise calcium and vitamin D supplementation with weightbearing exercises as tolerated. Follow up with PCP at least annually for continued monitoring and management.     Thrombocytopenia (HCC)  Chronic, stable with plts varying in the 140s-150s. Etiology unknown. Denies excessive bleeding/bruising. Continue to monitor. Follow up with PCP at least annually for continued monitoring and management.    Latest Reference Range & Units 11/27/19 03:49 05/11/22 12:44 08/23/23 08:14 05/08/24 08:09   Platelet Count 164 - 446 K/uL 153 (L) 147 (L) 148 (L) 144 (L)   (L): Data is abnormally low    Services suggested: No services needed at this time    Follow-up: No follow-ups on file.              [1]   Current " "Outpatient Medications   Medication Sig Dispense Refill    Estradiol 1 MG/GM Gel Place 1 mg/mL on the skin every day.      progesterone (PROMETRIUM) 100 MG Cap Take 100 mg by mouth every day.      Calcium-Vitamin D-Vitamin K 650-12.5-40 MG-MCG-MCG Chew Tab       Multiple Vitamin (MULTI-VITAMIN PO) Take 1 Tab by mouth every evening.       No current facility-administered medications for this visit.   [2]   Social History  Tobacco Use    Smoking status: Never    Smokeless tobacco: Never   Vaping Use    Vaping status: Never Used   Substance Use Topics    Alcohol use: Yes     Alcohol/week: 4.8 oz     Types: 8 Glasses of wine per week     Comment: 3-4 days a week    Drug use: No   [3]   Past Surgical History:  Procedure Laterality Date    ME EXPLORE PARATHYROID GLANDS Right 5/18/2022    Procedure: PARATHYROIDECTOMY;  Surgeon: Babs Leonard M.D.;  Location: SURGERY SAME DAY [facility];  Service: General    ORIF, ANKLE Left 9/21/2021    Procedure: ORIF, ANKLE - MEDIAL MEALLEOLOUS;  Surgeon: Jesús Holley M.D.;  Location: SURGERY [facility];  Service: Orthopedics    PLASTIC SURGERY  11/2019    \"Tummy Tuck\"    VAGINAL HYSTERECTOMY SCOPE TOTAL  1/26/2010    Performed by STEVEN LIAO at SURGERY SAME DAY [facility] ORS    CYSTOSCOPY  1/26/2010    Performed by STEVEN LIAO at SURGERY SAME DAY [facility] ORS    LAPAROSCOPY      OTHER      lasik    TONSILLECTOMY       "

## 2025-07-25 ENCOUNTER — HOSPITAL ENCOUNTER (OUTPATIENT)
Dept: LAB | Facility: MEDICAL CENTER | Age: 55
End: 2025-07-25
Attending: INTERNAL MEDICINE
Payer: COMMERCIAL

## 2025-07-25 DIAGNOSIS — E78.5 DYSLIPIDEMIA: ICD-10-CM

## 2025-07-25 DIAGNOSIS — D69.6 THROMBOCYTOPENIA (HCC): ICD-10-CM

## 2025-07-25 LAB
BASOPHILS # BLD AUTO: 1.1 % (ref 0–1.8)
BASOPHILS # BLD: 0.05 K/UL (ref 0–0.12)
CHOLEST SERPL-MCNC: 198 MG/DL (ref 100–199)
COMMENT 1642: NORMAL
EOSINOPHIL # BLD AUTO: 0.08 K/UL (ref 0–0.51)
EOSINOPHIL NFR BLD: 1.7 % (ref 0–6.9)
ERYTHROCYTE [DISTWIDTH] IN BLOOD BY AUTOMATED COUNT: 45.5 FL (ref 35.9–50)
FASTING STATUS PATIENT QL REPORTED: NORMAL
HCT VFR BLD AUTO: 46.4 % (ref 37–47)
HDLC SERPL-MCNC: 76 MG/DL
HGB BLD-MCNC: 15.6 G/DL (ref 12–16)
IMM GRANULOCYTES # BLD AUTO: 0.01 K/UL (ref 0–0.11)
IMM GRANULOCYTES NFR BLD AUTO: 0.2 % (ref 0–0.9)
LDLC SERPL CALC-MCNC: 106 MG/DL
LG PLATELETS BLD QL SMEAR: NORMAL
LYMPHOCYTES # BLD AUTO: 1.61 K/UL (ref 1–4.8)
LYMPHOCYTES NFR BLD: 33.9 % (ref 22–41)
MCH RBC QN AUTO: 32.4 PG (ref 27–33)
MCHC RBC AUTO-ENTMCNC: 33.6 G/DL (ref 32.2–35.5)
MCV RBC AUTO: 96.3 FL (ref 81.4–97.8)
MICROCYTES BLD QL SMEAR: ABNORMAL
MONOCYTES # BLD AUTO: 0.47 K/UL (ref 0–0.85)
MONOCYTES NFR BLD AUTO: 9.9 % (ref 0–13.4)
MORPHOLOGY BLD-IMP: NORMAL
NEUTROPHILS # BLD AUTO: 2.53 K/UL (ref 1.82–7.42)
NEUTROPHILS NFR BLD: 53.2 % (ref 44–72)
NRBC # BLD AUTO: 0 K/UL
NRBC BLD-RTO: 0 /100 WBC (ref 0–0.2)
OVALOCYTES BLD QL SMEAR: NORMAL
PLATELET # BLD AUTO: 128 K/UL (ref 164–446)
PLATELET BLD QL SMEAR: NORMAL
PMV BLD AUTO: 12.4 FL (ref 9–12.9)
POIKILOCYTOSIS BLD QL SMEAR: NORMAL
RBC # BLD AUTO: 4.82 M/UL (ref 4.2–5.4)
RBC BLD AUTO: PRESENT
TRIGL SERPL-MCNC: 78 MG/DL (ref 0–149)
WBC # BLD AUTO: 4.8 K/UL (ref 4.8–10.8)

## 2025-07-25 PROCEDURE — 80061 LIPID PANEL: CPT

## 2025-07-25 PROCEDURE — 36415 COLL VENOUS BLD VENIPUNCTURE: CPT

## 2025-07-25 PROCEDURE — 85025 COMPLETE CBC W/AUTO DIFF WBC: CPT

## 2025-07-28 ENCOUNTER — RESULTS FOLLOW-UP (OUTPATIENT)
Dept: MEDICAL GROUP | Age: 55
End: 2025-07-28
Payer: COMMERCIAL

## 2025-07-28 DIAGNOSIS — E61.1 IRON DEFICIENCY: ICD-10-CM

## 2025-07-28 DIAGNOSIS — R79.89 ABNORMAL CBC: ICD-10-CM

## 2025-07-28 DIAGNOSIS — R71.8 ANISOCYTOSIS: ICD-10-CM

## 2025-07-28 DIAGNOSIS — D69.6 THROMBOCYTOPENIA (HCC): Primary | ICD-10-CM

## 2025-08-04 ENCOUNTER — HOSPITAL ENCOUNTER (OUTPATIENT)
Dept: LAB | Facility: MEDICAL CENTER | Age: 55
End: 2025-08-04
Attending: INTERNAL MEDICINE
Payer: COMMERCIAL

## 2025-08-04 DIAGNOSIS — R79.89 ABNORMAL CBC: ICD-10-CM

## 2025-08-04 DIAGNOSIS — D69.6 THROMBOCYTOPENIA (HCC): ICD-10-CM

## 2025-08-04 DIAGNOSIS — E61.1 IRON DEFICIENCY: ICD-10-CM

## 2025-08-04 LAB
BASOPHILS # BLD AUTO: 0.8 % (ref 0–1.8)
BASOPHILS # BLD: 0.03 K/UL (ref 0–0.12)
EOSINOPHIL # BLD AUTO: 0.09 K/UL (ref 0–0.51)
EOSINOPHIL NFR BLD: 2.3 % (ref 0–6.9)
ERYTHROCYTE [DISTWIDTH] IN BLOOD BY AUTOMATED COUNT: 44.5 FL (ref 35.9–50)
FERRITIN SERPL-MCNC: 279 NG/ML (ref 10–291)
FOLATE SERPL-MCNC: 34.6 NG/ML
HCT VFR BLD AUTO: 44 % (ref 37–47)
HGB BLD-MCNC: 15.2 G/DL (ref 12–16)
IMM GRANULOCYTES # BLD AUTO: 0 K/UL (ref 0–0.11)
IMM GRANULOCYTES NFR BLD AUTO: 0 % (ref 0–0.9)
IRON SATN MFR SERPL: 31 % (ref 15–55)
IRON SERPL-MCNC: 80 UG/DL (ref 40–170)
LYMPHOCYTES # BLD AUTO: 1.38 K/UL (ref 1–4.8)
LYMPHOCYTES NFR BLD: 35 % (ref 22–41)
MCH RBC QN AUTO: 32.8 PG (ref 27–33)
MCHC RBC AUTO-ENTMCNC: 34.5 G/DL (ref 32.2–35.5)
MCV RBC AUTO: 94.8 FL (ref 81.4–97.8)
MONOCYTES # BLD AUTO: 0.38 K/UL (ref 0–0.85)
MONOCYTES NFR BLD AUTO: 9.6 % (ref 0–13.4)
NEUTROPHILS # BLD AUTO: 2.06 K/UL (ref 1.82–7.42)
NEUTROPHILS NFR BLD: 52.3 % (ref 44–72)
NRBC # BLD AUTO: 0 K/UL
NRBC BLD-RTO: 0 /100 WBC (ref 0–0.2)
PLATELET # BLD AUTO: 144 K/UL (ref 164–446)
PMV BLD AUTO: 12.5 FL (ref 9–12.9)
RBC # BLD AUTO: 4.64 M/UL (ref 4.2–5.4)
TIBC SERPL-MCNC: 257 UG/DL (ref 250–450)
UIBC SERPL-MCNC: 177 UG/DL (ref 110–370)
VIT B12 SERPL-MCNC: 685 PG/ML (ref 211–911)
WBC # BLD AUTO: 3.9 K/UL (ref 4.8–10.8)

## 2025-08-04 PROCEDURE — 85025 COMPLETE CBC W/AUTO DIFF WBC: CPT

## 2025-08-04 PROCEDURE — 83540 ASSAY OF IRON: CPT

## 2025-08-04 PROCEDURE — 82728 ASSAY OF FERRITIN: CPT

## 2025-08-04 PROCEDURE — 82607 VITAMIN B-12: CPT

## 2025-08-04 PROCEDURE — 36415 COLL VENOUS BLD VENIPUNCTURE: CPT

## 2025-08-04 PROCEDURE — 83550 IRON BINDING TEST: CPT

## 2025-08-04 PROCEDURE — 82746 ASSAY OF FOLIC ACID SERUM: CPT

## 2025-08-06 ENCOUNTER — RESULTS FOLLOW-UP (OUTPATIENT)
Dept: MEDICAL GROUP | Age: 55
End: 2025-08-06
Payer: COMMERCIAL

## 2025-08-07 ENCOUNTER — OFFICE VISIT (OUTPATIENT)
Dept: MEDICAL GROUP | Age: 55
End: 2025-08-07
Payer: COMMERCIAL

## 2025-08-07 VITALS
RESPIRATION RATE: 16 BRPM | HEART RATE: 80 BPM | WEIGHT: 167 LBS | BODY MASS INDEX: 25.31 KG/M2 | DIASTOLIC BLOOD PRESSURE: 74 MMHG | HEIGHT: 68 IN | TEMPERATURE: 98 F | OXYGEN SATURATION: 98 % | SYSTOLIC BLOOD PRESSURE: 106 MMHG

## 2025-08-07 DIAGNOSIS — D68.51 FACTOR 5 LEIDEN MUTATION, HETEROZYGOUS (HCC): Primary | ICD-10-CM

## 2025-08-07 DIAGNOSIS — E55.9 VITAMIN D DEFICIENCY: ICD-10-CM

## 2025-08-07 DIAGNOSIS — E61.1 IRON DEFICIENCY: ICD-10-CM

## 2025-08-07 DIAGNOSIS — D69.6 THROMBOCYTOPENIA (HCC): ICD-10-CM

## 2025-08-07 DIAGNOSIS — R71.8 POIKILOCYTOSIS: ICD-10-CM

## 2025-08-07 DIAGNOSIS — R79.89 ABNORMAL CBC: ICD-10-CM

## 2025-08-07 DIAGNOSIS — E78.5 DYSLIPIDEMIA: ICD-10-CM

## 2025-08-07 DIAGNOSIS — D58.1 OVALOCYTOSIS (HCC): ICD-10-CM

## 2025-08-07 PROBLEM — Z86.711 HISTORY OF PULMONARY EMBOLUS (PE): Chronic | Status: ACTIVE | Noted: 2019-11-26

## 2025-08-07 PROCEDURE — 99214 OFFICE O/P EST MOD 30 MIN: CPT | Performed by: INTERNAL MEDICINE

## 2025-08-07 PROCEDURE — 3074F SYST BP LT 130 MM HG: CPT | Performed by: INTERNAL MEDICINE

## 2025-08-07 PROCEDURE — 3078F DIAST BP <80 MM HG: CPT | Performed by: INTERNAL MEDICINE

## 2025-08-07 ASSESSMENT — FIBROSIS 4 INDEX: FIB4 SCORE: 2.63

## 2025-08-25 ENCOUNTER — HOSPITAL ENCOUNTER (OUTPATIENT)
Dept: LAB | Facility: MEDICAL CENTER | Age: 55
End: 2025-08-25
Attending: NURSE PRACTITIONER
Payer: COMMERCIAL

## 2025-08-25 LAB
25(OH)D3 SERPL-MCNC: 81 NG/ML (ref 30–100)
ALBUMIN SERPL BCP-MCNC: 4.6 G/DL (ref 3.2–4.9)
ALBUMIN/GLOB SERPL: 1.7 G/DL
ALP SERPL-CCNC: 80 U/L (ref 30–99)
ALT SERPL-CCNC: 26 U/L (ref 2–50)
ANION GAP SERPL CALC-SCNC: 13 MMOL/L (ref 7–16)
AST SERPL-CCNC: 33 U/L (ref 12–45)
BASOPHILS # BLD AUTO: 0.7 % (ref 0–1.8)
BASOPHILS # BLD: 0.03 K/UL (ref 0–0.12)
BILIRUB SERPL-MCNC: 0.5 MG/DL (ref 0.1–1.5)
BUN SERPL-MCNC: 14 MG/DL (ref 8–22)
CALCIUM ALBUM COR SERPL-MCNC: 9 MG/DL (ref 8.5–10.5)
CALCIUM SERPL-MCNC: 9.5 MG/DL (ref 8.5–10.5)
CHLORIDE SERPL-SCNC: 106 MMOL/L (ref 96–112)
CHOLEST SERPL-MCNC: 208 MG/DL (ref 100–199)
CO2 SERPL-SCNC: 22 MMOL/L (ref 20–33)
CREAT SERPL-MCNC: 0.84 MG/DL (ref 0.5–1.4)
EOSINOPHIL # BLD AUTO: 0.08 K/UL (ref 0–0.51)
EOSINOPHIL NFR BLD: 2 % (ref 0–6.9)
ERYTHROCYTE [DISTWIDTH] IN BLOOD BY AUTOMATED COUNT: 44.6 FL (ref 35.9–50)
EST. AVERAGE GLUCOSE BLD GHB EST-MCNC: 103 MG/DL
ESTRADIOL SERPL-MCNC: 34.6 PG/ML
FASTING STATUS PATIENT QL REPORTED: NORMAL
FSH SERPL-ACNC: 54.7 MIU/ML
GFR SERPLBLD CREATININE-BSD FMLA CKD-EPI: 82 ML/MIN/1.73 M 2
GLOBULIN SER CALC-MCNC: 2.7 G/DL (ref 1.9–3.5)
GLUCOSE SERPL-MCNC: 88 MG/DL (ref 65–99)
HBA1C MFR BLD: 5.2 % (ref 4–5.6)
HCT VFR BLD AUTO: 44.5 % (ref 37–47)
HDLC SERPL-MCNC: 78 MG/DL
HGB BLD-MCNC: 15.1 G/DL (ref 12–16)
IMM GRANULOCYTES # BLD AUTO: 0.01 K/UL (ref 0–0.11)
IMM GRANULOCYTES NFR BLD AUTO: 0.2 % (ref 0–0.9)
LDLC SERPL CALC-MCNC: 114 MG/DL
LH SERPL-ACNC: 40.7 IU/L
LYMPHOCYTES # BLD AUTO: 1.4 K/UL (ref 1–4.8)
LYMPHOCYTES NFR BLD: 34.3 % (ref 22–41)
MCH RBC QN AUTO: 31.9 PG (ref 27–33)
MCHC RBC AUTO-ENTMCNC: 33.9 G/DL (ref 32.2–35.5)
MCV RBC AUTO: 94.1 FL (ref 81.4–97.8)
MONOCYTES # BLD AUTO: 0.4 K/UL (ref 0–0.85)
MONOCYTES NFR BLD AUTO: 9.8 % (ref 0–13.4)
NEUTROPHILS # BLD AUTO: 2.16 K/UL (ref 1.82–7.42)
NEUTROPHILS NFR BLD: 53 % (ref 44–72)
NRBC # BLD AUTO: 0 K/UL
NRBC BLD-RTO: 0 /100 WBC (ref 0–0.2)
PLATELET # BLD AUTO: 170 K/UL (ref 164–446)
PMV BLD AUTO: 12.5 FL (ref 9–12.9)
POTASSIUM SERPL-SCNC: 4.4 MMOL/L (ref 3.6–5.5)
PROGEST SERPL-MCNC: 1.99 NG/ML
PROT SERPL-MCNC: 7.3 G/DL (ref 6–8.2)
RBC # BLD AUTO: 4.73 M/UL (ref 4.2–5.4)
SODIUM SERPL-SCNC: 141 MMOL/L (ref 135–145)
T3FREE SERPL-MCNC: 2.82 PG/ML (ref 2–4.4)
T4 FREE SERPL-MCNC: 1.29 NG/DL (ref 0.93–1.7)
T4 SERPL-MCNC: 7.6 UG/DL (ref 4–12)
TRIGL SERPL-MCNC: 81 MG/DL (ref 0–149)
TSH SERPL-ACNC: 0.94 UIU/ML (ref 0.38–5.33)
WBC # BLD AUTO: 4.1 K/UL (ref 4.8–10.8)

## 2025-08-25 PROCEDURE — 80053 COMPREHEN METABOLIC PANEL: CPT

## 2025-08-25 PROCEDURE — 84443 ASSAY THYROID STIM HORMONE: CPT

## 2025-08-25 PROCEDURE — 82306 VITAMIN D 25 HYDROXY: CPT

## 2025-08-25 PROCEDURE — 84144 ASSAY OF PROGESTERONE: CPT

## 2025-08-25 PROCEDURE — 84270 ASSAY OF SEX HORMONE GLOBUL: CPT

## 2025-08-25 PROCEDURE — 83001 ASSAY OF GONADOTROPIN (FSH): CPT

## 2025-08-25 PROCEDURE — 84481 FREE ASSAY (FT-3): CPT

## 2025-08-25 PROCEDURE — 84403 ASSAY OF TOTAL TESTOSTERONE: CPT

## 2025-08-25 PROCEDURE — 84482 T3 REVERSE: CPT

## 2025-08-25 PROCEDURE — 83036 HEMOGLOBIN GLYCOSYLATED A1C: CPT

## 2025-08-25 PROCEDURE — 85025 COMPLETE CBC W/AUTO DIFF WBC: CPT

## 2025-08-25 PROCEDURE — 84402 ASSAY OF FREE TESTOSTERONE: CPT

## 2025-08-25 PROCEDURE — 82670 ASSAY OF TOTAL ESTRADIOL: CPT

## 2025-08-25 PROCEDURE — 80061 LIPID PANEL: CPT

## 2025-08-25 PROCEDURE — 86800 THYROGLOBULIN ANTIBODY: CPT

## 2025-08-25 PROCEDURE — 86376 MICROSOMAL ANTIBODY EACH: CPT

## 2025-08-25 PROCEDURE — 36415 COLL VENOUS BLD VENIPUNCTURE: CPT

## 2025-08-25 PROCEDURE — 84439 ASSAY OF FREE THYROXINE: CPT

## 2025-08-25 PROCEDURE — 84305 ASSAY OF SOMATOMEDIN: CPT

## 2025-08-25 PROCEDURE — 83002 ASSAY OF GONADOTROPIN (LH): CPT

## 2025-08-27 LAB
THYROGLOB AB SERPL-ACNC: <1.5 IU/ML (ref 0–4)
THYROPEROXIDASE AB SERPL-ACNC: 1 IU/ML (ref 0–9)

## 2025-08-28 LAB
IGF-I SERPL-MCNC: 86 NG/ML (ref 49–234)
IGF-I Z-SCORE SERPL: -0.7

## 2025-08-30 LAB
SHBG SERPL-SCNC: 106 NMOL/L (ref 17–125)
TESTOST FREE SERPL-MCNC: 1 PG/ML (ref 0.6–3.8)
TESTOST SERPL-MCNC: 13 NG/DL (ref 9–55)

## 2025-08-31 LAB — T3REVERSE SERPL-MCNC: 14.7 NG/DL (ref 9–27)

## (undated) DEVICE — SPONGE PEANUT - (5/PK 50PK/CA)

## (undated) DEVICE — AGENT HEMOSTATIC BELLOW HEMOBLAST (12EA/CA)

## (undated) DEVICE — SODIUM CHL IRRIGATION 0.9% 1000ML (12EA/CA)

## (undated) DEVICE — KIT ANESTHESIA W/CIRCUIT & 3/LT BAG W/FILTER (20EA/CA)

## (undated) DEVICE — TOURNIQUET CUFF 34 X 4 ONE PORT DISP - STERILE (10/BX)

## (undated) DEVICE — CLIP SM INTNL HRZN TI ESCP LGT - (24EA/PK 25PK/BX)

## (undated) DEVICE — GLOVE BIOGEL SZ 6.5 SURGICAL PF LTX (50PR/BX 4BX/CA)

## (undated) DEVICE — PADDING CAST 4 IN STERILE - 4 X 4 YDS (24/CA)

## (undated) DEVICE — SUTURE 4-0 MONOCRYL PLUS PS-2 - 27 INCH (36/BX)

## (undated) DEVICE — MASK ANESTHESIA ADULT  - (100/CA)

## (undated) DEVICE — HEAD HOLDER JUNIOR/ADULT

## (undated) DEVICE — SUTURE 0 VICRYL PLUS CT-1 - 36 INCH (36/BX)

## (undated) DEVICE — GLOVE BIOGEL PI INDICATOR SZ 7.5 SURGICAL PF LF -(50/BX 4BX/CA)

## (undated) DEVICE — WRAP CO-FLEX 4IN X 5YD STERIL - SELF-ADHERENT (18/CA)

## (undated) DEVICE — SLEEVE VASO CALF MED - (10PR/CA)

## (undated) DEVICE — LACTATED RINGERS INJ 1000 ML - (14EA/CA 60CA/PF)

## (undated) DEVICE — GLOVE BIOGEL INDICATOR SZ 7SURGICAL PF LTX - (50/BX 4BX/CA)

## (undated) DEVICE — WATER IRRIGATION STERILE 1000ML (12EA/CA)

## (undated) DEVICE — GLOVE, LITE (PAIR)

## (undated) DEVICE — SUCTION INSTRUMENT YANKAUER BULBOUS TIP W/O VENT (50EA/CA)

## (undated) DEVICE — SUTURE 3-0 VICRYL PLUS SH - 27 INCH (36/BX)

## (undated) DEVICE — Device

## (undated) DEVICE — ELECTRODE 850 FOAM ADHESIVE - HYDROGEL RADIOTRNSPRNT (50/PK)

## (undated) DEVICE — SET LEADWIRE 5 LEAD BEDSIDE DISPOSABLE ECG (1SET OF 5/EA)

## (undated) DEVICE — BANDAGE ELASTIC STERILE VELCRO 6 X 5 YDS (25EA/CA)

## (undated) DEVICE — ELECTRODE DUAL RETURN W/ CORD - (50/PK)

## (undated) DEVICE — KIT RADIAL ARTERY 20GA W/MAX BARRIER AND BIOPATCH  (5EA/CA) #10740 IS FOR THE SET RADIAL ARTERIAL

## (undated) DEVICE — PROTECTOR ULNA NERVE - (36PR/CA)

## (undated) DEVICE — TOWEL STOP TIMEOUT SAFETY FLAG (40EA/CA)

## (undated) DEVICE — DRAPE MAGNETIC (INSTRA-MAG) - (30/CA)

## (undated) DEVICE — SUTURE 2-0 VICRYL PLUS CT-1 36 (36PK/BX)"

## (undated) DEVICE — DRESSING NON ADHERENT 3 X 4 - STERILE (100/BX 12BX/CA)

## (undated) DEVICE — GLOVE BIOGEL SZ 8 SURGICAL PF LTX - (50PR/BX 4BX/CA)

## (undated) DEVICE — SHEET THYROID - (10EA/CA)

## (undated) DEVICE — PADDING CAST 6 IN STERILE - 6 X 4 YDS (24/CA)

## (undated) DEVICE — KIT  I.V. START (100EA/CA)

## (undated) DEVICE — TUBE CONNECTING SUCTION - CLEAR PLASTIC STERILE 72 IN (50EA/CA)

## (undated) DEVICE — SUTURE 3-0 VICRYL PLUS SH - 8X 18 INCH (12/BX)

## (undated) DEVICE — SUTURE 5-0 MONOCRYL PLUS PC-3 - (12/BX)

## (undated) DEVICE — PACK LOWER EXTREMITY - (2/CA)

## (undated) DEVICE — GOWN SURGEONS LARGE - (32/CA)

## (undated) DEVICE — DRAPE C-ARM LARGE 41IN X 74 IN - (10/BX 2BX/CA)

## (undated) DEVICE — HANDPIECE THUNDERBEAT 5MM 35CM FRONT GRIP TYPE S (5EA/BX)

## (undated) DEVICE — SUTURE GENERAL

## (undated) DEVICE — CATHETER IV 20 GA X 1-1/4 ---SURG.& SDS ONLY--- (50EA/BX)

## (undated) DEVICE — GUIDE WIRE 1.25 X 150MM THRD - (4CSX6=24)

## (undated) DEVICE — PACK MINOR BASIN - (2EA/CA)

## (undated) DEVICE — CLIP MED INTNL HRZN TI ESCP - (25/BX)

## (undated) DEVICE — NEPTUNE 4 PORT MANIFOLD - (20/PK)

## (undated) DEVICE — DRESSING TRANSPARENT FILM TEGADERM 4 X 4.75" (50EA/BX)"

## (undated) DEVICE — GLOVE BIOGEL PI INDICATOR SZ 8.0 SURGICAL PF LF -(50/BX 4BX/CA)

## (undated) DEVICE — SPONGE GAUZESTER 4 X 4 4PLY - (128PK/CA)

## (undated) DEVICE — GOWN SURGEONS X-LARGE - DISP. (30/CA)

## (undated) DEVICE — DRAPE LG. APERTURE 33 X 51" - (10EA/BX)"

## (undated) DEVICE — GLOVE BIOGEL PI ORTHO SZ 7.5 PF LF (40PR/BX)

## (undated) DEVICE — CHLORAPREP 26 ML APPLICATOR - ORANGE TINT(25/CA)

## (undated) DEVICE — CANISTER SUCTION 3000ML MECHANICAL FILTER AUTO SHUTOFF MEDI-VAC NONSTERILE LF DISP  (40EA/CA)

## (undated) DEVICE — BOVIE NEEDLE TIP 3CM COLORADO

## (undated) DEVICE — SUTURE 3-0 ETHILON FS-1 - (36/BX) 30 INCH

## (undated) DEVICE — GOWN WARMING STANDARD FLEX - (30/CA)

## (undated) DEVICE — CANISTER SUCTION RIGID RED 1500CC (40EA/CA)

## (undated) DEVICE — TOURNIQUET CUFF 18 X 3 ONE PORT DISP - STERILE (10/BX)

## (undated) DEVICE — TUBING CLEARLINK DUO-VENT - C-FLO (48EA/CA)

## (undated) DEVICE — SENSOR SPO2 NEO LNCS ADHESIVE (20/BX) SEE USER NOTES

## (undated) DEVICE — HUMID-VENT HEAT AND MOISTURE EXCHANGE- (50/BX)

## (undated) DEVICE — BAG SPONGE COUNT 10.25 X 32 - BLUE (250/CA)